# Patient Record
Sex: FEMALE | Race: WHITE | Employment: OTHER | URBAN - METROPOLITAN AREA
[De-identification: names, ages, dates, MRNs, and addresses within clinical notes are randomized per-mention and may not be internally consistent; named-entity substitution may affect disease eponyms.]

---

## 2017-01-07 PROBLEM — H02.89: Status: ACTIVE | Noted: 2017-01-07

## 2017-02-08 ENCOUNTER — HOSPITAL ENCOUNTER (OUTPATIENT)
Dept: LAB | Age: 82
Discharge: HOME | End: 2017-02-08
Payer: COMMERCIAL

## 2017-02-08 LAB
FT4I SERPL CALC-MCNC: NORMAL
T3 SERPL-MCNC: 0.89 NG/ML (ref 0.6–1.81)
T3RU NFR SERPL: 39 % (ref 31–39)
T4 FREE SERPL-MCNC: 1.6 NG/DL (ref 0.78–1.46)
TSH W FREE THYROID I,TSHELE: 0.13 UIU/ML

## 2017-02-08 PROCEDURE — 86800 THYROGLOBULIN ANTIBODY: CPT | Performed by: OPHTHALMOLOGY

## 2017-02-08 PROCEDURE — 36415 COLL VENOUS BLD VENIPUNCTURE: CPT | Performed by: OPHTHALMOLOGY

## 2017-02-08 PROCEDURE — 84439 ASSAY OF FREE THYROXINE: CPT | Performed by: OPHTHALMOLOGY

## 2017-02-08 PROCEDURE — 86376 MICROSOMAL ANTIBODY EACH: CPT | Performed by: OPHTHALMOLOGY

## 2017-02-08 PROCEDURE — 84479 ASSAY OF THYROID (T3 OR T4): CPT | Performed by: OPHTHALMOLOGY

## 2017-02-08 PROCEDURE — 84480 ASSAY TRIIODOTHYRONINE (T3): CPT | Performed by: OPHTHALMOLOGY

## 2017-02-08 PROCEDURE — 84445 ASSAY OF TSI GLOBULIN: CPT | Performed by: OPHTHALMOLOGY

## 2017-02-08 PROCEDURE — 84443 ASSAY THYROID STIM HORMONE: CPT | Performed by: OPHTHALMOLOGY

## 2017-02-08 PROCEDURE — 83520 IMMUNOASSAY QUANT NOS NONAB: CPT | Performed by: OPHTHALMOLOGY

## 2017-02-09 LAB
THYROGLOB AB SERPL-ACNC: 5.2 IU/ML (ref 0–0.9)
THYROPEROXIDASE AB SERPL-ACNC: 7 IU/ML (ref 0–34)
TSH RECEP AB SER-ACNC: 198.4 IU/L (ref 0–1.75)

## 2017-02-14 LAB — TSI ACT/NOR SER: 467 % (ref 0–139)

## 2017-03-01 PROBLEM — H57.89 PERIORBITAL SWELLING: Status: ACTIVE | Noted: 2017-03-01

## 2017-04-20 PROBLEM — R94.31 ABNORMAL EKG: Status: ACTIVE | Noted: 2017-04-20

## 2017-05-22 PROBLEM — E05.00 GRAVES' DISEASE: Chronic | Status: ACTIVE | Noted: 2017-05-22

## 2017-08-07 PROBLEM — Z79.52 ON PREDNISONE THERAPY: Status: ACTIVE | Noted: 2017-08-07

## 2017-08-07 PROBLEM — E78.1 HYPERTRIGLYCERIDEMIA: Chronic | Status: ACTIVE | Noted: 2017-08-07

## 2017-08-07 PROBLEM — E05.00 GRAVES' OPHTHALMOPATHY: Chronic | Status: ACTIVE | Noted: 2017-08-07

## 2017-08-07 PROBLEM — H25.019 CORTICAL AGE-RELATED CATARACT: Chronic | Status: ACTIVE | Noted: 2017-08-07

## 2017-08-15 ENCOUNTER — HOSPITAL ENCOUNTER (OUTPATIENT)
Dept: MAMMOGRAPHY | Age: 82
Discharge: HOME | End: 2017-08-15
Attending: OBSTETRICS & GYNECOLOGY
Payer: COMMERCIAL

## 2017-08-15 DIAGNOSIS — Z12.31 VISIT FOR SCREENING MAMMOGRAM: ICD-10-CM

## 2017-08-15 PROCEDURE — 77067 SCR MAMMO BI INCL CAD: CPT

## 2017-08-29 PROBLEM — T50.905A HYPERGLYCEMIA, DRUG-INDUCED: Status: ACTIVE | Noted: 2017-08-29

## 2017-08-29 PROBLEM — R73.9 HYPERGLYCEMIA, DRUG-INDUCED: Status: ACTIVE | Noted: 2017-08-29

## 2017-10-13 ENCOUNTER — HOSPITAL ENCOUNTER (OUTPATIENT)
Dept: CT IMAGING | Age: 82
Discharge: HOME | End: 2017-10-13
Attending: OPHTHALMOLOGY
Payer: MEDICARE

## 2017-10-13 DIAGNOSIS — E07.9 THYROID EYE DISEASE: ICD-10-CM

## 2017-10-13 DIAGNOSIS — H57.89 THYROID EYE DISEASE: ICD-10-CM

## 2017-10-13 PROCEDURE — 70480 CT ORBIT/EAR/FOSSA W/O DYE: CPT

## 2017-12-05 PROBLEM — R73.09 ELEVATED HEMOGLOBIN A1C: Chronic | Status: ACTIVE | Noted: 2017-12-05

## 2018-03-13 PROBLEM — Z79.899 ON POTASSIUM WASTING DIURETIC THERAPY: Chronic | Status: ACTIVE | Noted: 2018-03-13

## 2018-03-22 ENCOUNTER — HOSPITAL ENCOUNTER (OUTPATIENT)
Dept: MAMMOGRAPHY | Age: 83
Discharge: HOME | End: 2018-03-22
Attending: INTERNAL MEDICINE
Payer: MEDICARE

## 2018-03-22 DIAGNOSIS — M85.80 OSTEOPENIA, UNSPECIFIED LOCATION: Chronic | ICD-10-CM

## 2018-03-22 PROCEDURE — 77080 DXA BONE DENSITY AXIAL: CPT

## 2018-05-01 PROBLEM — I45.2 RBBB (RIGHT BUNDLE BRANCH BLOCK WITH LEFT ANTERIOR FASCICULAR BLOCK): Status: ACTIVE | Noted: 2017-04-20

## 2018-05-10 ENCOUNTER — HOSPITAL ENCOUNTER (OUTPATIENT)
Dept: SURGERY | Age: 83
Discharge: HOME | End: 2018-05-10

## 2018-05-10 NOTE — PERIOP NOTES
No show to 10:00 appointment. Patient called. States she has been sick and still doesn't fell well. Explained that this appointment will need to be rescheduled and to call Dr. Maxine Chambers office. Patient verbalized understanding. Left voicemail at Dr. Maxine Chambers office informing them that pre assessment appointment will need to be rescheduled due to no show with call back # [phone].

## 2018-05-15 ENCOUNTER — HOSPITAL ENCOUNTER (OUTPATIENT)
Dept: SURGERY | Age: 83
Discharge: HOME | End: 2018-05-15
Attending: OPHTHALMOLOGY

## 2018-05-15 VITALS
HEART RATE: 56 BPM | RESPIRATION RATE: 16 BRPM | SYSTOLIC BLOOD PRESSURE: 138 MMHG | OXYGEN SATURATION: 97 % | WEIGHT: 129.1 LBS | DIASTOLIC BLOOD PRESSURE: 69 MMHG | BODY MASS INDEX: 20.26 KG/M2 | TEMPERATURE: 97.1 F | HEIGHT: 67 IN

## 2018-05-15 NOTE — PERIOP NOTES
Patient verified name, , and surgery as listed in [facility]. Cardiologist note dated 18 states \" low risk\" for surgery, ok to hold Eliquis 48 hours. Pt reports surgeon and cardiologist agreed on 48 hour hold. (Noted in EMR if needed for reference : ekgs dated 5-3-18, 17, echo dated 16, cath 12-9-15, cardiologist note 18.)    Type 1A surgery, PAT assessment complete. Labs per surgeon: none. Labs per anesthesia protocol: none. EKG: not needed today per anesthesia guidelines. Hibiclens and instructions given per hospital policy. Patient provided with and instructed on educational handouts including Guide to Surgery, Pain Management, Hand Hygiene, Blood Transfusion Education, and [facility] Anesthesia Brochure. Patient answered medical/surgical history questions at their best of ability. All prior to admission medications documented in [facility]. Original medication prescription bottle not visualized during patient appointment. Patient instructed to hold all vitamins 7 days prior to surgery and NSAIDS 5 days prior to surgery, patient verbalized understanding. Medications to be held: advil- 5 days; eliquis-48 hours : per surgeon's instructions to pt and cardiologist ok. Patient instructed to continue previous medications as prescribed prior to surgery and to take the following medications the day of surgery according to anesthesia guidelines with a small sip of water: xanax, tambocor, metoprolol, synthroid. Patient teach back successful and patient demonstrates knowledge of instructions.

## 2018-05-16 NOTE — H&P
[facility]  HISTORY AND PHYSICAL      Name:Komal NARANJO.  MR#: [id number]  : 1934  ACCOUNT #: [de-identified]   ADMIT DATE: 2018    The patient is scheduled for surgery on 2018 at 2:30 p.m. ALLERGIES:  LISINOPRIL. CHIEF COMPLAINT:  Swelling around her eyes, bilateral frequent tearing, light sensitivity that interferes with reading. PAST MEDICAL PAST SURGICAL, SOCIAL, AND FAMILY HISTORY:  Hypertension, atrial fibrillation, thyroid removal in , ovarian cancer in , hyperthyroidism, hyperlipidemia, Graves' disease and vitrectomy. CURRENT MEDICATIONS:  On a list that I will be faxing over. They include Flecainide, metoprolol, Eliquis, indapamide, Synthroid, lovastatin. PHYSICAL EXAMINATION:   CARDIOVASCULAR:  Her heart is S1, S2, NASR. LUNGS:  Clear to auscultation,    FINDINGS AND DIAGNOSIS:  Thyroid, ophthalmopathy, dermatochalasis, right upper lid and left upper lid, right lower lid, left lower lid, festoon on lower lid. PLAN OF CARE:  4 lid blepharoplasty including festoon excision.       Eddie Jeans, MD MJW/TOD  D: 05/15/2018 15:34     T: 05/15/2018 16:21  JOB #: [id number]

## 2018-05-17 ENCOUNTER — ANESTHESIA EVENT (OUTPATIENT)
Dept: SURGERY | Age: 83
End: 2018-05-17
Payer: MEDICARE

## 2018-05-17 RX ORDER — NALOXONE HYDROCHLORIDE 0.4 MG/ML
0.04 INJECTION, SOLUTION INTRAMUSCULAR; INTRAVENOUS; SUBCUTANEOUS
Status: CANCELLED | OUTPATIENT
Start: 2018-05-17

## 2018-05-17 RX ORDER — HYDROMORPHONE HYDROCHLORIDE 2 MG/ML
0.5 INJECTION, SOLUTION INTRAMUSCULAR; INTRAVENOUS; SUBCUTANEOUS
Status: CANCELLED | OUTPATIENT
Start: 2018-05-17

## 2018-05-17 RX ORDER — SODIUM CHLORIDE, SODIUM LACTATE, POTASSIUM CHLORIDE, CALCIUM CHLORIDE 600; 310; 30; 20 MG/100ML; MG/100ML; MG/100ML; MG/100ML
100 INJECTION, SOLUTION INTRAVENOUS CONTINUOUS
Status: CANCELLED | OUTPATIENT
Start: 2018-05-17

## 2018-05-17 RX ORDER — OXYCODONE HYDROCHLORIDE 5 MG/1
5 TABLET ORAL
Status: CANCELLED | OUTPATIENT
Start: 2018-05-17

## 2018-05-18 ENCOUNTER — HOSPITAL ENCOUNTER (OUTPATIENT)
Age: 83
Setting detail: OUTPATIENT SURGERY
Discharge: HOME | End: 2018-05-18
Attending: OPHTHALMOLOGY | Admitting: OPHTHALMOLOGY
Payer: MEDICARE

## 2018-05-18 ENCOUNTER — ANESTHESIA (OUTPATIENT)
Dept: SURGERY | Age: 83
End: 2018-05-18
Payer: MEDICARE

## 2018-05-18 VITALS
HEART RATE: 63 BPM | SYSTOLIC BLOOD PRESSURE: 135 MMHG | OXYGEN SATURATION: 91 % | TEMPERATURE: 97.2 F | DIASTOLIC BLOOD PRESSURE: 64 MMHG | WEIGHT: 127 LBS | BODY MASS INDEX: 20.19 KG/M2 | RESPIRATION RATE: 14 BRPM

## 2018-05-18 PROCEDURE — 74011000250 HC RX REV CODE- 250: Performed by: OPHTHALMOLOGY

## 2018-05-18 PROCEDURE — 77030018836 HC SOL IRR NACL ICUM -A: Performed by: OPHTHALMOLOGY

## 2018-05-18 PROCEDURE — 74011000250 HC RX REV CODE- 250

## 2018-05-18 PROCEDURE — 76210000020 HC REC RM PH II FIRST 0.5 HR: Performed by: OPHTHALMOLOGY

## 2018-05-18 PROCEDURE — 76060000036 HC ANESTHESIA 2.5 TO 3 HR: Performed by: OPHTHALMOLOGY

## 2018-05-18 PROCEDURE — 77030020782 HC GWN BAIR PAWS FLX 3M -B: Performed by: ANESTHESIOLOGY

## 2018-05-18 PROCEDURE — 76210000016 HC OR PH I REC 1 TO 1.5 HR: Performed by: OPHTHALMOLOGY

## 2018-05-18 PROCEDURE — 77030006689 HC BLD OPHTH BVR BD -A: Performed by: OPHTHALMOLOGY

## 2018-05-18 PROCEDURE — 76010000132 HC OR TIME 2.5 TO 3 HR: Performed by: OPHTHALMOLOGY

## 2018-05-18 PROCEDURE — 74011250636 HC RX REV CODE- 250/636

## 2018-05-18 PROCEDURE — 77030002916 HC SUT ETHLN J&J -A: Performed by: OPHTHALMOLOGY

## 2018-05-18 PROCEDURE — 77030013908 HC PROTCT CRNL CRCH B&L -A: Performed by: OPHTHALMOLOGY

## 2018-05-18 PROCEDURE — 88305 TISSUE EXAM BY PATHOLOGIST: CPT | Performed by: OPHTHALMOLOGY

## 2018-05-18 RX ORDER — BUPIVACAINE HYDROCHLORIDE 5 MG/ML
INJECTION, SOLUTION EPIDURAL; INTRACAUDAL AS NEEDED
Status: DISCONTINUED | OUTPATIENT
Start: 2018-05-18 | End: 2018-05-18 | Stop reason: HOSPADM

## 2018-05-18 RX ORDER — DEXAMETHASONE SODIUM PHOSPHATE 100 MG/10ML
INJECTION INTRAMUSCULAR; INTRAVENOUS AS NEEDED
Status: DISCONTINUED | OUTPATIENT
Start: 2018-05-18 | End: 2018-05-18 | Stop reason: HOSPADM

## 2018-05-18 RX ORDER — MIDAZOLAM HYDROCHLORIDE 1 MG/ML
INJECTION, SOLUTION INTRAMUSCULAR; INTRAVENOUS AS NEEDED
Status: DISCONTINUED | OUTPATIENT
Start: 2018-05-18 | End: 2018-05-18 | Stop reason: HOSPADM

## 2018-05-18 RX ORDER — SODIUM CHLORIDE, SODIUM LACTATE, POTASSIUM CHLORIDE, CALCIUM CHLORIDE 600; 310; 30; 20 MG/100ML; MG/100ML; MG/100ML; MG/100ML
INJECTION, SOLUTION INTRAVENOUS
Status: DISCONTINUED | OUTPATIENT
Start: 2018-05-18 | End: 2018-05-18 | Stop reason: HOSPADM

## 2018-05-18 RX ORDER — MIDAZOLAM HYDROCHLORIDE 1 MG/ML
2 INJECTION, SOLUTION INTRAMUSCULAR; INTRAVENOUS
Status: DISCONTINUED | OUTPATIENT
Start: 2018-05-18 | End: 2018-05-18 | Stop reason: HOSPADM

## 2018-05-18 RX ORDER — LIDOCAINE HYDROCHLORIDE 10 MG/ML
0.1 INJECTION INFILTRATION; PERINEURAL AS NEEDED
Status: DISCONTINUED | OUTPATIENT
Start: 2018-05-18 | End: 2018-05-18 | Stop reason: HOSPADM

## 2018-05-18 RX ORDER — LIDOCAINE HYDROCHLORIDE 20 MG/ML
INJECTION, SOLUTION EPIDURAL; INFILTRATION; INTRACAUDAL; PERINEURAL AS NEEDED
Status: DISCONTINUED | OUTPATIENT
Start: 2018-05-18 | End: 2018-05-18 | Stop reason: HOSPADM

## 2018-05-18 RX ORDER — LIDOCAINE HYDROCHLORIDE 10 MG/ML
INJECTION INFILTRATION; PERINEURAL AS NEEDED
Status: DISCONTINUED | OUTPATIENT
Start: 2018-05-18 | End: 2018-05-18 | Stop reason: HOSPADM

## 2018-05-18 RX ORDER — PROPOFOL 10 MG/ML
INJECTION, EMULSION INTRAVENOUS
Status: DISCONTINUED | OUTPATIENT
Start: 2018-05-18 | End: 2018-05-18 | Stop reason: HOSPADM

## 2018-05-18 RX ORDER — TETRACAINE HYDROCHLORIDE 5 MG/ML
SOLUTION OPHTHALMIC AS NEEDED
Status: DISCONTINUED | OUTPATIENT
Start: 2018-05-18 | End: 2018-05-18 | Stop reason: HOSPADM

## 2018-05-18 RX ORDER — FENTANYL CITRATE 50 UG/ML
100 INJECTION, SOLUTION INTRAMUSCULAR; INTRAVENOUS ONCE
Status: DISCONTINUED | OUTPATIENT
Start: 2018-05-18 | End: 2018-05-18 | Stop reason: HOSPADM

## 2018-05-18 RX ORDER — ONDANSETRON 2 MG/ML
INJECTION INTRAMUSCULAR; INTRAVENOUS AS NEEDED
Status: DISCONTINUED | OUTPATIENT
Start: 2018-05-18 | End: 2018-05-18 | Stop reason: HOSPADM

## 2018-05-18 RX ORDER — MIDAZOLAM HYDROCHLORIDE 1 MG/ML
2 INJECTION, SOLUTION INTRAMUSCULAR; INTRAVENOUS ONCE
Status: DISCONTINUED | OUTPATIENT
Start: 2018-05-18 | End: 2018-05-18 | Stop reason: HOSPADM

## 2018-05-18 RX ORDER — CEFAZOLIN SODIUM 1 G/3ML
INJECTION, POWDER, FOR SOLUTION INTRAMUSCULAR; INTRAVENOUS AS NEEDED
Status: DISCONTINUED | OUTPATIENT
Start: 2018-05-18 | End: 2018-05-18 | Stop reason: HOSPADM

## 2018-05-18 RX ORDER — SODIUM CHLORIDE, SODIUM LACTATE, POTASSIUM CHLORIDE, CALCIUM CHLORIDE 600; 310; 30; 20 MG/100ML; MG/100ML; MG/100ML; MG/100ML
100 INJECTION, SOLUTION INTRAVENOUS CONTINUOUS
Status: DISCONTINUED | OUTPATIENT
Start: 2018-05-18 | End: 2018-05-18 | Stop reason: HOSPADM

## 2018-05-18 RX ORDER — PROPOFOL 10 MG/ML
INJECTION, EMULSION INTRAVENOUS AS NEEDED
Status: DISCONTINUED | OUTPATIENT
Start: 2018-05-18 | End: 2018-05-18 | Stop reason: HOSPADM

## 2018-05-18 RX ADMIN — PROPOFOL 40 MG: 10 INJECTION, EMULSION INTRAVENOUS at 14:46

## 2018-05-18 RX ADMIN — DEXAMETHASONE SODIUM PHOSPHATE 4 MG: 100 INJECTION INTRAMUSCULAR; INTRAVENOUS at 15:00

## 2018-05-18 RX ADMIN — MIDAZOLAM HYDROCHLORIDE 1 MG: 1 INJECTION, SOLUTION INTRAMUSCULAR; INTRAVENOUS at 15:18

## 2018-05-18 RX ADMIN — ONDANSETRON 4 MG: 2 INJECTION INTRAMUSCULAR; INTRAVENOUS at 15:00

## 2018-05-18 RX ADMIN — SODIUM CHLORIDE, SODIUM LACTATE, POTASSIUM CHLORIDE, CALCIUM CHLORIDE: 600; 310; 30; 20 INJECTION, SOLUTION INTRAVENOUS at 14:36

## 2018-05-18 RX ADMIN — PROPOFOL 50 MCG/KG/MIN: 10 INJECTION, EMULSION INTRAVENOUS at 14:46

## 2018-05-18 RX ADMIN — CEFAZOLIN SODIUM 0.5 G: 1 INJECTION, POWDER, FOR SOLUTION INTRAMUSCULAR; INTRAVENOUS at 17:03

## 2018-05-18 RX ADMIN — MIDAZOLAM HYDROCHLORIDE 0.5 MG: 1 INJECTION, SOLUTION INTRAMUSCULAR; INTRAVENOUS at 14:49

## 2018-05-18 RX ADMIN — SODIUM CHLORIDE, SODIUM LACTATE, POTASSIUM CHLORIDE, CALCIUM CHLORIDE: 600; 310; 30; 20 INJECTION, SOLUTION INTRAVENOUS at 15:14

## 2018-05-18 RX ADMIN — MIDAZOLAM HYDROCHLORIDE 0.5 MG: 1 INJECTION, SOLUTION INTRAMUSCULAR; INTRAVENOUS at 15:45

## 2018-05-18 RX ADMIN — LIDOCAINE HYDROCHLORIDE 60 MG: 20 INJECTION, SOLUTION EPIDURAL; INFILTRATION; INTRACAUDAL; PERINEURAL at 14:42

## 2018-05-18 NOTE — IP AVS SNAPSHOT
[address] [address] 
[phone] Patient: Lyndon Quarles MRN: [id number] GAW:[id number] About your hospitalization You were admitted on:  May 18, 2018 You last received care in the:  [facility] PACU You were discharged on:  May 18, 2018 Why you were hospitalized Your primary diagnosis was:  Not on File Follow-up Information Follow up With Details Comments Contact Info Candelaria Yeung MD   [facility] 18 [facility] for [address] [facility], [place], operated by [facility] [address] 
[phone] In 1 week For suture removal   
  
Your Scheduled Appointments Thursday June 14, 2018  8:40 AM EDT  
LAB with IMD LAB [facility] ([facility] [address] [facility] [address]  
[phone] Thursday June 21, 2018 11:00 AM EDT Office Visit with Shaggy Ocampo MD  
Internal Medicine and Diagnostics ([facility] [address] [facility] [id number] [phone] Discharge Orders None A check rufina indicates which time of day the medication should be taken. My Medications ASK your doctor about these medications Instructions Each Dose to Equal  
 Morning Noon Evening Bedtime ADVIL 200 mg tablet Generic drug:  ibuprofen Your last dose was: Your next dose is: Take  by mouth. ALPRAZolam 0.25 mg tablet Commonly known as:  Sunita Stai Your last dose was: Your next dose is:    
   
   
 1 po daily, prn anxiety  
     
   
   
   
  
 apixaban 2.5 mg tablet Commonly known as:  Norman Royals Your last dose was: Your next dose is: Take 1/2 to 1 BID as directed CITRACAL PLUS D 315-200 mg-unit Tab Generic drug:  calcium citrate-vitamin d3 Your last dose was: Your next dose is: Take 1 Tab by mouth nightly. 1 Tab DIETARY SUPPLEMENT PO Your last dose was: Your next dose is: Take  by mouth. HYDROEYE  
     
   
   
   
  
 flecainide 100 mg tablet Commonly known as:  TAMBOCOR Your last dose was: Your next dose is: Take 1 tablet twice a day  
     
   
   
   
  
 glucosamine-chondroitin 500-400 mg Cap Commonly known as:  [facility] Your last dose was: Your next dose is: Take 1 Cap by mouth two (2) times a day. 1 Cap  
    
   
   
   
  
 indapamide 1.25 mg tablet Commonly known as:  Homero Lowe Your last dose was: Your next dose is: Take 1 Tab by mouth daily. 1.25 mg  
    
   
   
   
  
 lovastatin 40 mg tablet Commonly known as:  MEVACOR Your last dose was: Your next dose is: Take 1 Tab by mouth nightly. 40 mg  
    
   
   
   
  
 metoprolol succinate 50 mg XL tablet Commonly known as:  TOPROL-XL Your last dose was: Your next dose is: Take 1 Tab by mouth daily. 50 mg  
    
   
   
   
  
 multivitamin tablet Commonly known as:  ONE A DAY Your last dose was: Your next dose is: Take 1 Tab by mouth nightly. 1 Tab PREMARIN 0.625 mg/gram vaginal cream  
Generic drug:  conjugated estrogens Your last dose was: Your next dose is: Insert 0.5 g into vagina every seven (7) days. 0.5 g  
    
   
   
   
  
 SYNTHROID 88 mcg tablet Generic drug:  levothyroxine Your last dose was: Your next dose is: Take 1 Tab by mouth Daily (before breakfast). 88 mcg VITAMIN C 500 mg tablet Generic drug:  ascorbic acid (vitamin C) Your last dose was: Your next dose is: Take 500 mg by mouth as needed.   
 500 mg  
    
   
 XIIDRA 5 % Dpet Generic drug:  lifitegrast  
   
Your last dose was: Your next dose is:    
   
   
 INSTILL 1 UNIT AS DIRECTED INTO BOTH EYES TWICE A DAY Discharge Instructions The Center for [address] Lamont Vaughn MD 
 
Oculoplastic Pre & Post-Operative Instructions: 
 
Before Surgery: 1. Do not eat or drink for 8 hours before your surgery. 2.  You may take any medicines with a sip of water, however. 3.  Be sure to take any regular medicines unless you have received  
     alternative instructions. 4.  Do not use any Aspirin for 2 weeks before surgery unless told otherwise. During Surgery: 1. If you need to cough or sneeze, please give us some warning. 2.  Tell us if you are uncomfortable. The first 48 hours after surgery: 1. Use ice packs as much as tolerated for the first 48 hours.] 2. Try to avoid heavy lifting or straining for the first week. 3.  Do not engage in important business for the first 24 hours after surgery. 4.  Avoid alcoholic beverages for the first 24 hours. 5.  If possible, have a relative or friend stay with you the first night after surgery. 6.  Try to keep your head elevated for the first 1 to 2 days when resting. 7.  You may resume Aspirin use the day after surgery. 8.  You may shower or bathe, but try to keep the wounds as clean and dry as possible. When to call Dr. Delfino Keller after surgery: (office [phone] or cell phone [phone]) 1. If you have severe pain that you have not expected. 2.  If you notice a marked decrease in the vision of either eye. You should check 
     each eye separately 2 to 3 times in the first 24 hours after surgery. 3.  If your eyes swell shut. 4.  If you have excessive bleeding or blood soaking the bandages. 5.  If you develop a fever greater than 101 degrees orally. Introducing [facility]! [facility] introduces [employer] patient portal. Now you can access parts of your medical record, email your doctor's office, and request medication refills online. 1. In your internet browser, go to [email or web address] 2. Click on the First Time User? Click Here link in the Sign In box. You will see the New Member Sign Up page. 3. Enter your hulu Access Code exactly as it appears below. You will not need to use this code after youve completed the sign-up process. If you do not sign up before the expiration date, you must request a new code. · hulu Access Code: [email or web address] Expires: 5/30/2018 10:24 AM 
 
4. Enter the last four digits of your Social Security Number (xxxx) and Date of Birth (mm/dd/yyyy) as indicated and click Submit. You will be taken to the next sign-up page. 5. Create a hulu ID. This will be your hulu login ID and cannot be changed, so think of one that is secure and easy to remember. 6. Create a hulu password. You can change your password at any time. 7. Enter your Password Reset Question and Answer. This can be used at a later time if you forget your password. 8. Enter your e-mail address. You will receive e-mail notification when new information is available in [address] 9. Click Sign Up. You can now view and download portions of your medical record. 10. Click the Download Summary menu link to download a portable copy of your medical information. If you have questions, please visit the Frequently Asked Questions section of the hulu website. Remember, hulu is NOT to be used for urgent needs. For medical emergencies, dial 911. Now available from your iPhone and Android! Introducing Lamine Luu As a [facility] patient, I wanted to make you aware of our electronic visit tool called Lamine Luu. [facility] 24/7 allows you to connect within minutes with a medical provider 24 hours a day, seven days a week via a mobile device or tablet or logging into a secure website from your computer. You can access [employer] from anywhere in the [place]. A virtual visit might be right for you when you have a simple condition and feel like you just dont want to get out of bed, or cant get away from work for an appointment, when your regular [facility] provider is not available (evenings, weekends or holidays), or when youre out of town and need minor care. Electronic visits cost only $49 and if the [facility] provider determines a prescription is needed to treat your condition, one can be electronically transmitted to a nearby pharmacy*. Please take a moment to enroll today if you have not already done so. The enrollment process is free and takes just a few minutes. To enroll, please download the [facility] [employer] 24/[employer] shirley to your tablet or phone, or visit [email or web address] to enroll on your computer. And, as an [address] [place] [address] patient with a [employer] account, the results of your visits will be scanned into your electronic medical record and your primary care provider will be able to view the scanned results. We urge you to continue to see your regular [facility] provider for your ongoing medical care. And while your primary care provider may not be the one available when you seek a Lamine Leesmarandafin virtual visit, the peace of mind you get from getting a real diagnosis real time can be priceless. For more information on F[employer]arandaf[employer], view our Frequently Asked Questions (FAQs) at [email or web address]. Sincerely, 
 
Stacy Ayala MD 
Chief Medical Officer [address] [place] [address] *:  certain medications cannot be prescribed via Flickmesharmin Providers Seen During Your Hospitalization Provider Specialty Primary office phone Rigo Marx MD Ophthalmology [phone] Your Primary Care Physician (PCP) Primary Care Physician Office Phone Office Fax  [facility] [address] [phone] You are allergic to the following No active allergies Recent Documentation Weight BMI OB Status Smoking Status 57.6 kg 20.19 kg/m2 Hysterectomy Never Smoker Emergency Contacts Name Discharge Info Relation Home Work Mobile [facility] DISCHARGE CAREGIVER [3] Son [22] [phone] Jose Sparrow DISCHARGE CAREGIVER [3] Son [22] [phone] Patient Belongings The following personal items are in your possession at time of discharge: 
  Dental Appliances: None Please provide this summary of care documentation to your next provider. Signatures-by signing, you are acknowledging that this After Visit Summary has been reviewed with you and you have received a copy. Patient Signature:  ____________________________________________________________ Date:  ____________________________________________________________  
  
Linda Mckeon Provider Signature:  ____________________________________________________________ Date:  ____________________________________________________________

## 2018-05-18 NOTE — ANESTHESIA POSTPROCEDURE EVALUATION
Post-Anesthesia Evaluation and Assessment    Patient: Julieta Tubbs MRN: [id number]  SSN: [id number]    YOB: 1934  Age: 80 y.o. Sex: female       Cardiovascular Function/Vital Signs  Visit Vitals    /67 (BP 1 Location: Right arm, BP Patient Position: At rest)    Pulse 63    Temp 36.2 °C (97.2 °F)    Resp 14    Wt 57.6 kg (127 lb)    SpO2 93%    BMI 20.19 kg/m2       Patient is status post total IV anesthesia anesthesia for Procedure(s):  BLEPHAROPLASTY X 4 .    Nausea/Vomiting: None    Postoperative hydration reviewed and adequate. Pain:  Pain Scale 1: Visual (05/18/18 1741)  Pain Intensity 1: 0 (05/18/18 1741)   Managed    Neurological Status:   Neuro (WDL): Within Defined Limits (05/18/18 1300)   At baseline    Mental Status and Level of Consciousness: Arousable    Pulmonary Status:   O2 Device: Room air (05/18/18 1741)   Adequate oxygenation and airway patent    Complications related to anesthesia: None    Post-anesthesia assessment completed.  No concerns    Signed By: Adalberto Bush MD     May 18, 2018

## 2018-05-18 NOTE — DISCHARGE INSTRUCTIONS
The [facility]/ Derian Ferguson MD    Oculoplastic Pre & Post-Operative Instructions:    Before Surgery:    1. Do not eat or drink for 8 hours before your surgery. 2.  You may take any medicines with a sip of water, however. 3.  Be sure to take any regular medicines unless you have received        alternative instructions. 4.  Do not use any Aspirin for 2 weeks before surgery unless told otherwise. During Surgery:    1. If you need to cough or sneeze, please give us some warning. 2.  Tell us if you are uncomfortable. The first 48 hours after surgery:    1. Use ice packs as much as tolerated for the first 48 hours.]  2. Try to avoid heavy lifting or straining for the first week. 3.  Do not engage in important business for the first 24 hours after surgery. 4.  Avoid alcoholic beverages for the first 24 hours. 5.  If possible, have a relative or friend stay with you the first night after surgery. 6.  Try to keep your head elevated for the first 1 to 2 days when resting. 7.  You may resume Aspirin use the day after surgery. 8.  You may shower or bathe, but try to keep the wounds as clean and dry as possible. When to call Dr. Lizz Higginbotham after surgery: (office [phone] or cell phone [phone])    1. If you have severe pain that you have not expected. 2.  If you notice a marked decrease in the vision of either eye. You should check       each eye separately 2 to 3 times in the first 24 hours after surgery. 3.  If your eyes swell shut. 4.  If you have excessive bleeding or blood soaking the bandages. 5.  If you develop a fever greater than 101 degrees orally. [address] Detailed

## 2018-05-18 NOTE — BRIEF OP NOTE
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      Surgeon(s) and Role:     * Sybil Gómez MD - Primary         Surgical Assistant: none    Surgical Staff:  Circ-1: Zackary Sheriff RN  Circ-Relief: Sandra Ruby RN; Chadwick Horton RN; Waldemar Holloway RN  Scrub Tech-1: Bonilla Krause  Event Time In   Incision Start 1445   Incision Close 1702     Anesthesia: MAC   Estimated Blood Loss: less than 40 cc  Specimens:   ID Type Source Tests Collected by Time Destination   1 : LEFT EYE MYXEDEMA Preservative   Sybil Gómez MD 5/18/2018 1538 Pathology      Findings: diffuse soft tissue thickening from myxedematous fluid changes   Complications: 2 small nicks of lower lid skin (closed with sutures), small nick of lacrimal gland of right upper lid requiring cautery to control oozing  Implants: * No implants in log *

## 2018-05-18 NOTE — PROGRESS NOTES
Spiritual Care Visit, initial visit. Visited with patient at bedside. Prayed for patient's healing and health. Visit by Gianfranco Hill, Staff .  Mary Ellen., Mary.B., B.A.

## 2018-05-19 NOTE — OP NOTES
[address] [place] [address] REPORT    Name:Jacqueline NARANJO.  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 PROCEDURE PERFORMED:  Upper and lower lid blepharoplasty with excision of myxedematous tissue and excision of excess fat pads. SURGEON: Dinh    ASSISTANT: none     ANESTHESIA: local/mac    ESTIMATED BLOOD LOSS: less than 40 cc    SPECIMENS REMOVED: skin and soft tissue, sample sent to pathology     COMPLICATIONS:   1. Two small skin perforations in the lower lid because of excision of myxedema tissue lower lids, one on each side. 2.  Small incision in right upper lateral lacrimal gland area which required cautery. IMPLANTS: none     OPERATIVE NOTE:  The patient was checked in the holding area and was brought into the operating suite. Upper and lower lid blepharoplasty type incisions were marked, although the amount of skin tissue planned for the lower lids was much greater than normal.  Under sedation, 2% lidocaine with epinephrine was injected into both upper and lower lids on both sides. She was prepped and draped. Attention was first directed to the left upper lid. A 15 blade was used to incise through skin and soft tissue. Nesha and tenotomy scissors were used to excise down to the orbital septum. Tissue was excised. There was excess myxedematous tissue that was also excised involving some of the orbicularis layer. The preaponeurotic fat pads were identified. These were fairly extensive. The fat pads were mobilized and then were clamped, cut and cauterized. Additional excision was done of a medial fat pad with care taken in this area.   Cautery was done to complete hemostasis. Attention was then directed to the left lower lid. A 15 blade was used to incise the skin and soft tissue of the lower lid. Nesha and tenotomy scissors were used to excise edematous and soft tissue down to the orbital septum. Excess myxedematous tissue of the layer anterior to the orbital septum and below the skin layer was excised. In doing so, I created a small nick of the skin, which had to be closed with interrupted 6-0 Vicryl sutures. Additional myxedematous tissue was excised. The lower lid fat pads were opened and excess fat was mobilized, with care taken to avoid any damage to the underlying inferior oblique muscle. All the excess fat was clamped, cut and cauterized. Care was taken, especially in the medial area. Hemostasis was achieved with cautery. Both upper and lower lids were inspected for any active bleeders and cautery was done. These were then closed with 5-0 nylon in a running fashion for the upper lid and 6-0 nylon in a running fashion for the lower lid. Additional injection was given on the right side in the upper and lower lids. A similar procedure was then done in the right upper lid and the right lower lid. A 15 blade was used to incise the skin and soft tissue. Nesha and tenotomy scissors were used to excise all edematous tissue that was feasible to excise. The upper lid fat pads were reduced as described on the left side. Cautery was done to achieve hemostasis. There was a small cut made in the upper right lacrimal gland that required some cautery. However, the lacrimal gland here was identified and was not excised. The right lower lid was similarly operated on, as was in the left lower lid. A 15 blade was used to incise the skin and soft tissue, and excess myxedematous tissue was excised from the layer anterior to the orbital septum up to the skin layer.   There was a small nick made in the skin almost in the same position of the left lower lid. This was closed with interrupted 6-0 Vicryl sutures. Excess myxedematous tissue was excised in the medial aspect, as well, as was done on the left side. The lower lid fat pads were mobilized with care taken to avoid damage to the underlying inferior oblique muscle. These were clamped, cut and cauterized. Additional cautery was done to achieve hemostasis. The upper and lower lids were inspected for any bleeders and additional cautery was done to achieve complete hemostasis. The right upper lid was then closed with a running 5-0 nylon suture and the lower lid was closed with a running 6-0 nylon suture. During the procedure, the globes were protected with Lacrilube and a corneal protector on each side. Tetracaine drops were used before placement of the protectors. There was a tendency throughout the procedure for the left eye to open up more than the right eye, requiring multiple repositionings of the corneal protector. I am not sure why she has this difference. At the end of the procedure, both sides were inspected. This showed a marked reduction in her excess tissue. I did not have to do separate festoon excision because of the way I did internal excision of the myxedematous tissue from the lower lids. I am hoping this will not result in ectropion of the lower lids, but that will need to be watched. This patient may need canthoplasty in the future to handle that. She does not appear to have undue tension on the lower lids at the end of the procedure, and I did not feel a canthoplasty was required at the end of this procedure. COMMENT:  This patient has a very unusual amount of myxedematous tissue that was excised during this procedure. This was quite unusual compared to a regular blepharoplasty. We achieved a very substantial reduction in her lid swelling on both sides with this procedure.   She tolerated the surgery well and went to recovery in satisfactory condition.       MD Shirin Guadalupe / Gill Julien  D: 05/18/2018 17:14     T: 05/18/2018 19:25  JOB #: [id number]

## 2018-06-21 PROBLEM — H81.10 BENIGN PAROXYSMAL POSITIONAL VERTIGO: Status: ACTIVE | Noted: 2018-06-21

## 2018-08-17 ENCOUNTER — HOSPITAL ENCOUNTER (OUTPATIENT)
Dept: MAMMOGRAPHY | Age: 83
Discharge: HOME | End: 2018-08-17
Attending: INTERNAL MEDICINE
Payer: MEDICARE

## 2018-08-17 DIAGNOSIS — Z12.31 VISIT FOR SCREENING MAMMOGRAM: ICD-10-CM

## 2018-08-17 PROCEDURE — 77067 SCR MAMMO BI INCL CAD: CPT

## 2019-04-30 PROBLEM — M81.0 OSTEOPOROSIS WITHOUT CURRENT PATHOLOGICAL FRACTURE: Status: ACTIVE | Noted: 2019-04-30

## 2019-04-30 PROBLEM — E55.9 HYPOVITAMINOSIS D: Status: ACTIVE | Noted: 2019-04-30

## 2019-05-08 ENCOUNTER — HOSPITAL ENCOUNTER (EMERGENCY)
Age: 84
Discharge: HOME | End: 2019-05-08
Payer: MEDICARE

## 2019-05-08 VITALS
OXYGEN SATURATION: 95 % | BODY MASS INDEX: 20.72 KG/M2 | DIASTOLIC BLOOD PRESSURE: 61 MMHG | HEIGHT: 67 IN | RESPIRATION RATE: 16 BRPM | HEART RATE: 62 BPM | WEIGHT: 132 LBS | TEMPERATURE: 97.9 F | SYSTOLIC BLOOD PRESSURE: 131 MMHG

## 2019-05-08 DIAGNOSIS — I48.0 INTERMITTENT ATRIAL FIBRILLATION (HCC): Primary | ICD-10-CM

## 2019-05-08 LAB
ALBUMIN SERPL-MCNC: 3.4 G/DL (ref 3.2–4.6)
ALBUMIN/GLOB SERPL: 1 {RATIO} (ref 1.2–3.5)
ALP SERPL-CCNC: 85 U/L (ref 50–136)
ALT SERPL-CCNC: 16 U/L (ref 12–65)
ANION GAP SERPL CALC-SCNC: 7 MMOL/L (ref 7–16)
AST SERPL-CCNC: 15 U/L (ref 15–37)
ATRIAL RATE: 110 BPM
ATRIAL RATE: 61 BPM
BASOPHILS # BLD: 0 K/UL (ref 0–0.2)
BASOPHILS NFR BLD: 1 % (ref 0–2)
BILIRUB SERPL-MCNC: 0.5 MG/DL (ref 0.2–1.1)
BUN SERPL-MCNC: 24 MG/DL (ref 8–23)
CALCIUM SERPL-MCNC: 9.3 MG/DL (ref 8.3–10.4)
CALCULATED P AXIS, ECG09: -125 DEGREES
CALCULATED P AXIS, ECG09: 70 DEGREES
CALCULATED R AXIS, ECG10: -85 DEGREES
CALCULATED R AXIS, ECG10: -94 DEGREES
CALCULATED T AXIS, ECG11: -34 DEGREES
CALCULATED T AXIS, ECG11: -6 DEGREES
CHLORIDE SERPL-SCNC: 105 MMOL/L (ref 98–107)
CO2 SERPL-SCNC: 27 MMOL/L (ref 21–32)
CREAT SERPL-MCNC: 0.81 MG/DL (ref 0.6–1)
DIAGNOSIS, 93000: NORMAL
DIAGNOSIS, 93000: NORMAL
DIFFERENTIAL METHOD BLD: ABNORMAL
EOSINOPHIL # BLD: 0.1 K/UL (ref 0–0.8)
EOSINOPHIL NFR BLD: 2 % (ref 0.5–7.8)
ERYTHROCYTE [DISTWIDTH] IN BLOOD BY AUTOMATED COUNT: 12.9 % (ref 11.9–14.6)
GLOBULIN SER CALC-MCNC: 3.4 G/DL (ref 2.3–3.5)
GLUCOSE SERPL-MCNC: 97 MG/DL (ref 65–100)
HCT VFR BLD AUTO: 40.1 % (ref 35.8–46.3)
HGB BLD-MCNC: 13 G/DL (ref 11.7–15.4)
IMM GRANULOCYTES # BLD AUTO: 0 K/UL (ref 0–0.5)
IMM GRANULOCYTES NFR BLD AUTO: 0 % (ref 0–5)
LYMPHOCYTES # BLD: 1.6 K/UL (ref 0.5–4.6)
LYMPHOCYTES NFR BLD: 33 % (ref 13–44)
MCH RBC QN AUTO: 29.9 PG (ref 26.1–32.9)
MCHC RBC AUTO-ENTMCNC: 32.4 G/DL (ref 31.4–35)
MCV RBC AUTO: 92.2 FL (ref 79.6–97.8)
MONOCYTES # BLD: 0.6 K/UL (ref 0.1–1.3)
MONOCYTES NFR BLD: 13 % (ref 4–12)
NEUTS SEG # BLD: 2.5 K/UL (ref 1.7–8.2)
NEUTS SEG NFR BLD: 52 % (ref 43–78)
NRBC # BLD: 0 K/UL (ref 0–0.2)
P-R INTERVAL, ECG05: 160 MS
P-R INTERVAL, ECG05: 202 MS
PLATELET # BLD AUTO: 244 K/UL (ref 150–450)
PMV BLD AUTO: 9.5 FL (ref 9.4–12.3)
POTASSIUM SERPL-SCNC: 3.2 MMOL/L (ref 3.5–5.1)
PROT SERPL-MCNC: 6.8 G/DL (ref 6.3–8.2)
Q-T INTERVAL, ECG07: 360 MS
Q-T INTERVAL, ECG07: 458 MS
QRS DURATION, ECG06: 160 MS
QRS DURATION, ECG06: 176 MS
QTC CALCULATION (BEZET), ECG08: 461 MS
QTC CALCULATION (BEZET), ECG08: 487 MS
RBC # BLD AUTO: 4.35 M/UL (ref 4.05–5.2)
SODIUM SERPL-SCNC: 139 MMOL/L (ref 136–145)
TROPONIN I BLD-MCNC: 0.01 NG/ML (ref 0.02–0.05)
TROPONIN I BLD-MCNC: 0.01 NG/ML (ref 0.02–0.05)
VENTRICULAR RATE, ECG03: 110 BPM
VENTRICULAR RATE, ECG03: 61 BPM
WBC # BLD AUTO: 4.9 K/UL (ref 4.3–11.1)

## 2019-05-08 PROCEDURE — 93005 ELECTROCARDIOGRAM TRACING: CPT

## 2019-05-08 PROCEDURE — 85025 COMPLETE CBC W/AUTO DIFF WBC: CPT

## 2019-05-08 PROCEDURE — 84484 ASSAY OF TROPONIN QUANT: CPT

## 2019-05-08 PROCEDURE — 80053 COMPREHEN METABOLIC PANEL: CPT

## 2019-05-08 PROCEDURE — 99285 EMERGENCY DEPT VISIT HI MDM: CPT

## 2019-05-08 RX ORDER — METOPROLOL TARTRATE 5 MG/5ML
5 INJECTION INTRAVENOUS ONCE
Status: DISCONTINUED | OUTPATIENT
Start: 2019-05-08 | End: 2019-05-08 | Stop reason: HOSPADM

## 2019-05-08 NOTE — ED TRIAGE NOTES
EMs: Pt arriving from home via [address]. Called out for pressure in throat and chest. 1 nitro and 500NS. 18 LAC. Pt kept burping during transport. PT has hx of Afib. Pt denies N/V/diaphoresis. Upon arrival pt denies pain. Dr Deshpande Part present during triage. Pt denies discomfort at this time, sts \"I feel much better, I just thought for a moment there when it was so bad that I just couldn't afford to ignore it\" Pt sts previous discomfort was relieved by belching

## 2019-05-08 NOTE — ED NOTES
Verbal report given to JACOBY Daniels for continuation of patient care upon shift change. Patient care transferred at this time.

## 2019-05-08 NOTE — ED PROVIDER NOTES
80-year-old female brought by EMS for rapid heartbeat. Patient states she was getting ready for bed when she started having some chest pressure radiating up into her neck. She felt like her heart was racing. The patient has a history of A. Fib with RVR was recently in the hospital had to be cardioverted. She recently was on flecainide however it was recently discontinued. Palpitations This is a recurrent problem. The current episode started 1 to 2 hours ago. The problem has been resolved. The problem occurs constantly. The problem is associated with nothing. Associated symptoms include chest pain and irregular heartbeat. Pertinent negatives include no diaphoresis, no fever, no syncope, no leg pain and no lower extremity edema. Risk factors include cardiac disease. Her past medical history is significant for atrial fibrillation. Past Medical History:  
Diagnosis Date  Anxiety disorder  Aortic regurgitation  Asymptomatic bilateral carotid artery stenosis  Diverticulosis of large intestine  GERD (gastroesophageal reflux disease)  History of colon polyps  History of herpes zoster  History of ovarian cancer  Hypertension  Irritable bowel syndrome  Malignant neoplasm of rectosigmoid junction  Mitral valve disorders(424.0)  Mixed hyperlipidemia  Occlusion and stenosis of carotid artery without mention of cerebral infarction  Osteoarthritis  Osteopenia  Paroxysmal atrial fibrillation  Postmenopausal atrophic vaginitis  Primary hypothyroidism  Vaginal atrophy  Vitamin D deficiency Past Surgical History:  
Procedure Laterality Date  HX ACL RECONSTRUCTION Left 2014  HX CARPAL TUNNEL RELEASE Left 2009  HX CATARACT REMOVAL Right 2/2013  
 intraocular lens implant  HX COLECTOMY  HX COLONOSCOPY  12/2014  
 dr Nadia Morales  HX CYST INCISION AND DRAINAGE Left   
 swollen tissue around knee replacement  HX HEART CATHETERIZATION  12/9/2015  HX KNEE REPLACEMENT Left 2004  HX SMALL BOWEL RESECTION  12/2014 and 1/2015 [address]  HX THYROIDECTOMY  08/24/2016  HX TONSILLECTOMY Age 32  
 HX VITRECTOMY Right Family History:  
Problem Relation Age of Onset  Heart Disease Mother  Stroke Mother  Heart Failure Mother  Osteoporosis Mother  Diabetes Father  Stroke Father  Heart Failure Father  Cancer Sister  Breast Cancer Sister 80  Stroke Sister  Heart Disease Other  Hypertension Other  Diabetes Other  Stroke Other  Thyroid Disease Neg Hx Social History Socioeconomic History  Marital status:  Spouse name: Not on file  Number of children: Not on file  Years of education: Not on file  Highest education level: Not on file Occupational History  Not on file Social Needs  Financial resource strain: Not on file  Food insecurity:  
  Worry: Not on file Inability: Not on file  Transportation needs:  
  Medical: Not on file Non-medical: Not on file Tobacco Use  Smoking status: Never Smoker  Smokeless tobacco: Never Used Substance and Sexual Activity  Alcohol use: Yes Comment: occassional  
 Drug use: No  
  Types: Prescription, OTC  Sexual activity: Not on file Lifestyle  Physical activity:  
  Days per week: Not on file Minutes per session: Not on file  Stress: Not on file Relationships  Social connections:  
  Talks on phone: Not on file Gets together: Not on file Attends [employer] service: Not on file Active member of club or organization: Not on file Attends meetings of clubs or organizations: Not on file Relationship status: Not on file  Intimate partner violence:  
  Fear of current or ex partner: Not on file Emotionally abused: Not on file Physically abused: Not on file Forced sexual activity: Not on file Other Topics Concern  Not on file Social History Narrative  Not on file ALLERGIES: Patient has no known allergies. Review of Systems Constitutional: Negative. Negative for activity change, diaphoresis and fever. HENT: Negative. Eyes: Negative. Respiratory: Negative. Cardiovascular: Positive for chest pain and palpitations. Negative for syncope. Gastrointestinal: Negative. Genitourinary: Negative. Musculoskeletal: Negative. Skin: Negative. Neurological: Negative. Psychiatric/Behavioral: Negative. All other systems reviewed and are negative. Vitals:  
 05/08/19 6560 05/08/19 0050 05/08/19 0657 05/08/19 4872 BP:      
Pulse: 63 60 90 (!) 114 Resp:      
Temp:      
SpO2:      
Weight:      
Height:      
      
 
Physical Exam  
Constitutional: She is oriented to person, place, and time. She appears well-developed and well-nourished. No distress. HENT:  
Head: Normocephalic and atraumatic. Right Ear: External ear normal.  
Left Ear: External ear normal.  
Nose: Nose normal.  
Mouth/Throat: Oropharynx is clear and moist. No oropharyngeal exudate. Eyes: Pupils are equal, round, and reactive to light. Conjunctivae and EOM are normal. Right eye exhibits no discharge. Left eye exhibits no discharge. No scleral icterus. Neck: Normal range of motion. Neck supple. No JVD present. No tracheal deviation present. Cardiovascular: Intact distal pulses. An irregularly irregular rhythm present. Tachycardia present. Patient intermittently converting to normal sinus rhythm and back to A. Fib. Patient has a right bundle branch block. EKG appears unchanged. Pulmonary/Chest: Effort normal and breath sounds normal. No stridor. No respiratory distress. She has no wheezes. She exhibits no tenderness. Abdominal: Soft. Bowel sounds are normal. She exhibits no distension and no mass. There is no tenderness. Musculoskeletal: Normal range of motion. She exhibits no edema or tenderness. Neurological: She is alert and oriented to person, place, and time. No cranial nerve deficit. Skin: Skin is warm and dry. No rash noted. She is not diaphoretic. No erythema. No pallor. Psychiatric: She has a normal mood and affect. Her behavior is normal. Thought content normal.  
Nursing note and vitals reviewed. MDM Number of Diagnoses or Management Options Diagnosis management comments: Patient has been in and out of atrial fibrillation times the patient states that it just lasted longer this time. She converted spontaneously back into the sinus rhythm rate was controlled. Have her take an extra beta-blocker follow-up cardiology tomorrow Amount and/or Complexity of Data Reviewed Clinical lab tests: ordered and reviewed Tests in the radiology section of CPT®: ordered and reviewed Tests in the medicine section of CPT®: ordered and reviewed Risk of Complications, Morbidity, and/or Mortality Presenting problems: high Diagnostic procedures: high Management options: high Patient Progress Patient progress: stable Procedures

## 2019-05-08 NOTE — DISCHARGE INSTRUCTIONS
Patient Education        Learning About Atrial Fibrillation  What is atrial fibrillation? Atrial fibrillation (say \"AY-tree-gloria qaw-oxsz-QSF-shun\") is the most common type of irregular heartbeat (arrhythmia). Normally, the heart beats in a strong, steady rhythm. In atrial fibrillation, a problem with the heart's electrical system causes the two upper parts of the heart (the atria) to quiver, or fibrillate. Your heart rate also may be faster than normal.  Atrial fibrillation can be dangerous because if the heartbeat isn't strong and steady, blood can collect, or pool, in the atria. And pooled blood is more likely to form clots. Clots can travel to the brain, block blood flow, and cause a stroke. Atrial fibrillation can also lead to heart failure. Treatment for atrial fibrillation helps prevent stroke and heart failure. It also helps relieve symptoms. Atrial fibrillation is often caused by another heart problem. It may happen after heart surgery. It may also be caused by other problems, such as an overactive thyroid gland or lung disease. Many people with atrial fibrillation are able to live full and active lives. What are the symptoms? Some people feel symptoms when they have episodes of atrial fibrillation. But other people don't notice any symptoms. If you have symptoms, you may feel:  · A fluttering, racing, or pounding feeling in your chest called palpitations. · Weak or tired. · Dizzy or lightheaded. · Short of breath. · Chest pain. · Confused. You may notice signs of atrial fibrillation when you check your pulse. Your pulse may seem uneven or fast.  What can you expect when you have atrial fibrillation? At first, spells of atrial fibrillation may come on suddenly and last a short time. It may go away on its own or it goes away after treatment. This is called paroxysmal atrial fibrillation. Over time, the spells may last longer and occur more often. They often don't go away on their own.   How is it treated? Treatments can help you feel better and prevent future problems, especially stroke and heart failure. The main types of treatment slow the heart rate, control the heart rhythm, and help prevent stroke. Your treatment will depend on the cause of your atrial fibrillation, your symptoms, and your risk for stroke. · Heart rate treatment. Medicine may be used to slow your heart rate. Your heartbeat may still be irregular. But these medicines keep your heart from beating too fast. They may also help relieve your symptoms. · Heart rhythm treatment. Different treatments may be used to try to stop atrial fibrillation and keep it from returning. They can also relieve symptoms. These treatments include medicine, electrical cardioversion to shock the heart back to a normal rhythm, a procedure called catheter ablation, and heart surgery. · Stroke prevention. You and your doctor can decide how to lower your risk. You may decide to take a blood-thinning medicine, such as aspirin or an anticoagulant. How can you live well with it? You can live well and help manage atrial fibrillation by having a heart-healthy lifestyle. To have a heart-healthy lifestyle:  · Don't smoke. · Eat heart-healthy foods. · Be active. Talk to your doctor about what type and level of exercise is safe for you. · Stay at a healthy weight. Lose weight if you need to. · Manage stress. · Avoid alcohol if it triggers symptoms. · Manage other health problems such as high blood pressure, high cholesterol, and diabetes. · Avoid getting sick from the flu. Get a flu shot every year. Where can you learn more? Go to http://moisés-purnima.info/. Enter 668-243-4349 in the search box to learn more about \"Learning About Atrial Fibrillation. \"  Current as of: July 22, 2018  Content Version: 11.9  © 7868-7334 [employer].  Care instructions adapted under license by [employer] (which disclaims liability or warranty for this information). If you have questions about a medical condition or this instruction, always ask your healthcare professional. [place] [address] any warranty or liability for your use of this information.

## 2019-05-16 ENCOUNTER — HOSPITAL ENCOUNTER (OUTPATIENT)
Dept: ULTRASOUND IMAGING | Age: 84
Discharge: HOME | End: 2019-05-16

## 2019-05-16 DIAGNOSIS — K80.20 CALCULUS OF GALLBLADDER WITHOUT CHOLECYSTITIS WITHOUT OBSTRUCTION: ICD-10-CM

## 2019-08-17 ENCOUNTER — HOSPITAL ENCOUNTER (EMERGENCY)
Age: 84
Discharge: HOME | End: 2019-08-17
Attending: EMERGENCY MEDICINE
Payer: MEDICARE

## 2019-08-17 PROCEDURE — 99291 CRITICAL CARE FIRST HOUR: CPT | Performed by: EMERGENCY MEDICINE

## 2019-08-19 ENCOUNTER — HOSPITAL ENCOUNTER (OUTPATIENT)
Dept: MAMMOGRAPHY | Age: 84
Discharge: HOME | End: 2019-08-19
Attending: INTERNAL MEDICINE

## 2019-08-19 DIAGNOSIS — Z12.31 VISIT FOR SCREENING MAMMOGRAM: ICD-10-CM

## 2020-08-24 ENCOUNTER — HOSPITAL ENCOUNTER (OUTPATIENT)
Dept: MAMMOGRAPHY | Age: 85
Discharge: HOME | End: 2020-08-24
Attending: INTERNAL MEDICINE

## 2020-08-24 DIAGNOSIS — Z12.31 SCREENING MAMMOGRAM FOR HIGH-RISK PATIENT: ICD-10-CM

## 2021-08-04 ENCOUNTER — TRANSCRIBE ORDER (OUTPATIENT)
Dept: REGISTRATION | Age: 86
End: 2021-08-04

## 2021-08-04 DIAGNOSIS — Z12.31 VISIT FOR SCREENING MAMMOGRAM: Primary | ICD-10-CM

## 2021-09-15 ENCOUNTER — HOSPITAL ENCOUNTER (OUTPATIENT)
Dept: MAMMOGRAPHY | Age: 86
Discharge: HOME | End: 2021-09-15
Attending: INTERNAL MEDICINE
Payer: MEDICARE

## 2021-09-15 DIAGNOSIS — M81.0 OSTEOPOROSIS WITHOUT CURRENT PATHOLOGICAL FRACTURE, UNSPECIFIED OSTEOPOROSIS TYPE: ICD-10-CM

## 2021-09-15 DIAGNOSIS — Z12.31 VISIT FOR SCREENING MAMMOGRAM: ICD-10-CM

## 2021-09-15 PROCEDURE — 77063 BREAST TOMOSYNTHESIS BI: CPT

## 2021-09-15 PROCEDURE — 77080 DXA BONE DENSITY AXIAL: CPT

## 2021-09-16 NOTE — PROGRESS NOTES
Patient notified of results and instructions, per Dr Timmy Lechuga note, and voiced understanding.  She agrees to referral.

## 2021-09-16 NOTE — PROGRESS NOTES
Mile Woodallr, please let patient know her dexa scan is showing severe osteoporosis. I am referring her to rheumatology for further management of this.  Thank you

## 2022-01-20 ENCOUNTER — HOSPITAL ENCOUNTER (OUTPATIENT)
Dept: LAB | Age: 87
Discharge: HOME | End: 2022-01-20
Payer: MEDICARE

## 2022-01-20 DIAGNOSIS — D47.2 IGG MONOCLONAL PROTEIN DISORDER: ICD-10-CM

## 2022-01-20 LAB
25(OH)D3 SERPL-MCNC: 44.4 NG/ML (ref 30–100)
ALBUMIN SERPL-MCNC: 3.6 G/DL (ref 3.2–4.6)
ALBUMIN/GLOB SERPL: 0.9 {RATIO} (ref 1.2–3.5)
ALP SERPL-CCNC: 87 U/L (ref 50–136)
ALT SERPL-CCNC: 22 U/L (ref 12–65)
ANION GAP SERPL CALC-SCNC: 4 MMOL/L (ref 7–16)
AST SERPL-CCNC: 19 U/L (ref 15–37)
BASOPHILS # BLD: 0 K/UL (ref 0–0.2)
BASOPHILS NFR BLD: 1 % (ref 0–2)
BILIRUB SERPL-MCNC: 0.4 MG/DL (ref 0.2–1.1)
BUN SERPL-MCNC: 19 MG/DL (ref 8–23)
CALCIUM SERPL-MCNC: 9.6 MG/DL (ref 8.3–10.4)
CHLORIDE SERPL-SCNC: 103 MMOL/L (ref 98–107)
CO2 SERPL-SCNC: 27 MMOL/L (ref 21–32)
CREAT SERPL-MCNC: 0.9 MG/DL (ref 0.6–1)
CRP SERPL-MCNC: <0.3 MG/DL (ref 0–0.9)
DIFFERENTIAL METHOD BLD: NORMAL
EOSINOPHIL # BLD: 0.1 K/UL (ref 0–0.8)
EOSINOPHIL NFR BLD: 1 % (ref 0.5–7.8)
ERYTHROCYTE [DISTWIDTH] IN BLOOD BY AUTOMATED COUNT: 13.2 % (ref 11.9–14.6)
GLOBULIN SER CALC-MCNC: 4.1 G/DL (ref 2.3–3.5)
GLUCOSE SERPL-MCNC: 97 MG/DL (ref 65–100)
HCT VFR BLD AUTO: 42.8 % (ref 35.8–46.3)
HGB BLD-MCNC: 14.1 G/DL (ref 11.7–15.4)
IMM GRANULOCYTES # BLD AUTO: 0 K/UL (ref 0–0.5)
IMM GRANULOCYTES NFR BLD AUTO: 1 % (ref 0–5)
LDH SERPL L TO P-CCNC: 182 U/L (ref 110–210)
LYMPHOCYTES # BLD: 1.3 K/UL (ref 0.5–4.6)
LYMPHOCYTES NFR BLD: 23 % (ref 13–44)
MCH RBC QN AUTO: 30.1 PG (ref 26.1–32.9)
MCHC RBC AUTO-ENTMCNC: 32.9 G/DL (ref 31.4–35)
MCV RBC AUTO: 91.3 FL (ref 79.6–97.8)
MONOCYTES # BLD: 0.5 K/UL (ref 0.1–1.3)
MONOCYTES NFR BLD: 10 % (ref 4–12)
NEUTS SEG # BLD: 3.6 K/UL (ref 1.7–8.2)
NEUTS SEG NFR BLD: 64 % (ref 43–78)
NRBC # BLD: 0 K/UL (ref 0–0.2)
PLATELET # BLD AUTO: 282 K/UL (ref 150–450)
PMV BLD AUTO: 9.5 FL (ref 9.4–12.3)
POTASSIUM SERPL-SCNC: 4.1 MMOL/L (ref 3.5–5.1)
PROT SERPL-MCNC: 7.7 G/DL (ref 6.3–8.2)
RBC # BLD AUTO: 4.69 M/UL (ref 4.05–5.2)
SODIUM SERPL-SCNC: 134 MMOL/L (ref 136–145)
WBC # BLD AUTO: 5.5 K/UL (ref 4.3–11.1)

## 2022-01-20 PROCEDURE — 83615 LACTATE (LD) (LDH) ENZYME: CPT

## 2022-01-20 PROCEDURE — 82306 VITAMIN D 25 HYDROXY: CPT

## 2022-01-20 PROCEDURE — 36415 COLL VENOUS BLD VENIPUNCTURE: CPT

## 2022-01-20 PROCEDURE — 80053 COMPREHEN METABOLIC PANEL: CPT

## 2022-01-20 PROCEDURE — 84156 ASSAY OF PROTEIN URINE: CPT

## 2022-01-20 PROCEDURE — 83521 IG LIGHT CHAINS FREE EACH: CPT

## 2022-01-20 PROCEDURE — 85025 COMPLETE CBC W/AUTO DIFF WBC: CPT

## 2022-01-20 PROCEDURE — 82784 ASSAY IGA/IGD/IGG/IGM EACH: CPT

## 2022-01-20 PROCEDURE — 86140 C-REACTIVE PROTEIN: CPT

## 2022-01-20 PROCEDURE — 82232 ASSAY OF BETA-2 PROTEIN: CPT

## 2022-01-21 LAB
B2 MICROGLOB SERPL-MCNC: 3.1 MG/L (ref 0.6–2.4)
KAPPA LC FREE SER-MCNC: 342.2 MG/L (ref 3.3–19.4)
KAPPA LC FREE/LAMBDA FREE SER: 27.82 {RATIO} (ref 0.26–1.65)
LAMBDA LC FREE SERPL-MCNC: 12.3 MG/L (ref 5.7–26.3)

## 2022-01-24 LAB
ALBUMIN 24H MFR UR ELPH: 9.6 %
ALBUMIN SERPL ELPH-MCNC: 3.7 G/DL (ref 2.9–4.4)
ALBUMIN/GLOB SERPL: 1.3 {RATIO} (ref 0.7–1.7)
ALPHA1 GLOB 24H MFR UR ELPH: 3.9 %
ALPHA1 GLOB SERPL ELPH-MCNC: 0.2 G/DL (ref 0–0.4)
ALPHA2 GLOB 24H MFR UR ELPH: 11.7 %
ALPHA2 GLOB SERPL ELPH-MCNC: 0.7 G/DL (ref 0.4–1)
B-GLOBULIN MFR UR ELPH: 21 %
B-GLOBULIN SERPL ELPH-MCNC: 0.9 G/DL (ref 0.7–1.3)
GAMMA GLOB 24H MFR UR ELPH: 53.7 %
GAMMA GLOB SERPL ELPH-MCNC: 1.2 G/DL (ref 0.4–1.8)
GLOBULIN SER-MCNC: 3 G/DL (ref 2.2–3.9)
IGA SERPL-MCNC: 98 MG/DL (ref 64–422)
IGG SERPL-MCNC: 1227 MG/DL (ref 586–1602)
IGM SERPL-MCNC: 33 MG/DL (ref 26–217)
INTERPRETATION SERPL IEP-IMP: ABNORMAL
INTERPRETATION UR IFE-IMP: ABNORMAL
M PROTEIN 24H MFR UR ELPH: 34.9 %
M PROTEIN SERPL ELPH-MCNC: 0.8 G/DL
NOTE, 149533: ABNORMAL
PROT SERPL-MCNC: 6.7 G/DL (ref 6–8.5)
PROT UR-MCNC: 6.9 MG/DL

## 2022-01-27 ENCOUNTER — HOSPITAL ENCOUNTER (OUTPATIENT)
Dept: GENERAL RADIOLOGY | Age: 87
Discharge: HOME | End: 2022-01-27
Attending: INTERNAL MEDICINE
Payer: MEDICARE

## 2022-01-27 DIAGNOSIS — D47.2 IGG MONOCLONAL PROTEIN DISORDER: ICD-10-CM

## 2022-01-27 PROCEDURE — 77074 RADEX OSSEOUS SURVEY LMTD: CPT

## 2022-02-24 ENCOUNTER — HOSPITAL ENCOUNTER (OUTPATIENT)
Dept: LAB | Age: 87
Discharge: HOME | End: 2022-02-24
Payer: MEDICARE

## 2022-02-24 DIAGNOSIS — D47.2 IGG MONOCLONAL PROTEIN DISORDER: ICD-10-CM

## 2022-02-24 LAB
ALBUMIN SERPL-MCNC: 3.5 G/DL (ref 3.2–4.6)
ALBUMIN/GLOB SERPL: 1 {RATIO} (ref 1.2–3.5)
ALP SERPL-CCNC: 76 U/L (ref 50–136)
ALT SERPL-CCNC: 24 U/L (ref 12–65)
ANION GAP SERPL CALC-SCNC: 7 MMOL/L (ref 7–16)
AST SERPL-CCNC: 15 U/L (ref 15–37)
BASOPHILS # BLD: 0 K/UL (ref 0–0.2)
BASOPHILS NFR BLD: 1 % (ref 0–2)
BILIRUB SERPL-MCNC: 0.6 MG/DL (ref 0.2–1.1)
BUN SERPL-MCNC: 22 MG/DL (ref 8–23)
CALCIUM SERPL-MCNC: 10.1 MG/DL (ref 8.3–10.4)
CHLORIDE SERPL-SCNC: 101 MMOL/L (ref 98–107)
CO2 SERPL-SCNC: 27 MMOL/L (ref 21–32)
CREAT SERPL-MCNC: 0.7 MG/DL (ref 0.6–1)
DIFFERENTIAL METHOD BLD: NORMAL
EOSINOPHIL # BLD: 0.1 K/UL (ref 0–0.8)
EOSINOPHIL NFR BLD: 1 % (ref 0.5–7.8)
ERYTHROCYTE [DISTWIDTH] IN BLOOD BY AUTOMATED COUNT: 13 % (ref 11.9–14.6)
GLOBULIN SER CALC-MCNC: 3.5 G/DL (ref 2.3–3.5)
GLUCOSE SERPL-MCNC: 94 MG/DL (ref 65–100)
HCT VFR BLD AUTO: 39.5 % (ref 35.8–46.3)
HGB BLD-MCNC: 12.9 G/DL (ref 11.7–15.4)
IMM GRANULOCYTES # BLD AUTO: 0 K/UL (ref 0–0.5)
IMM GRANULOCYTES NFR BLD AUTO: 0 % (ref 0–5)
LYMPHOCYTES # BLD: 1.3 K/UL (ref 0.5–4.6)
LYMPHOCYTES NFR BLD: 23 % (ref 13–44)
MCH RBC QN AUTO: 29.7 PG (ref 26.1–32.9)
MCHC RBC AUTO-ENTMCNC: 32.7 G/DL (ref 31.4–35)
MCV RBC AUTO: 90.8 FL (ref 79.6–97.8)
MONOCYTES # BLD: 0.6 K/UL (ref 0.1–1.3)
MONOCYTES NFR BLD: 11 % (ref 4–12)
NEUTS SEG # BLD: 3.6 K/UL (ref 1.7–8.2)
NEUTS SEG NFR BLD: 64 % (ref 43–78)
NRBC # BLD: 0 K/UL (ref 0–0.2)
PLATELET # BLD AUTO: 255 K/UL (ref 150–450)
PMV BLD AUTO: 9.5 FL (ref 9.4–12.3)
POTASSIUM SERPL-SCNC: 4.1 MMOL/L (ref 3.5–5.1)
PROT SERPL-MCNC: 7 G/DL (ref 6.3–8.2)
RBC # BLD AUTO: 4.35 M/UL (ref 4.05–5.2)
SODIUM SERPL-SCNC: 135 MMOL/L (ref 136–145)
WBC # BLD AUTO: 5.6 K/UL (ref 4.3–11.1)

## 2022-02-24 PROCEDURE — 36415 COLL VENOUS BLD VENIPUNCTURE: CPT

## 2022-02-24 PROCEDURE — 88342 IMHCHEM/IMCYTCHM 1ST ANTB: CPT

## 2022-02-24 PROCEDURE — 85025 COMPLETE CBC W/AUTO DIFF WBC: CPT

## 2022-02-24 PROCEDURE — 80053 COMPREHEN METABOLIC PANEL: CPT

## 2022-02-24 PROCEDURE — 88364 INSITU HYBRIDIZATION (FISH): CPT

## 2022-02-24 PROCEDURE — 88311 DECALCIFY TISSUE: CPT

## 2022-02-24 PROCEDURE — 82784 ASSAY IGA/IGD/IGG/IGM EACH: CPT

## 2022-02-24 PROCEDURE — 88365 INSITU HYBRIDIZATION (FISH): CPT

## 2022-02-24 PROCEDURE — 83521 IG LIGHT CHAINS FREE EACH: CPT

## 2022-02-24 PROCEDURE — 88313 SPECIAL STAINS GROUP 2: CPT

## 2022-02-24 PROCEDURE — 88305 TISSUE EXAM BY PATHOLOGIST: CPT

## 2022-02-24 PROCEDURE — 84156 ASSAY OF PROTEIN URINE: CPT

## 2022-02-25 LAB
KAPPA LC FREE SER-MCNC: 386.1 MG/L (ref 3.3–19.4)
KAPPA LC FREE/LAMBDA FREE SER: 29.7 {RATIO} (ref 0.26–1.65)
LAMBDA LC FREE SERPL-MCNC: 13 MG/L (ref 5.7–26.3)

## 2022-02-28 LAB
ALBUMIN SERPL ELPH-MCNC: 3.4 G/DL (ref 2.9–4.4)
ALBUMIN/GLOB SERPL: 1.2 {RATIO} (ref 0.7–1.7)
ALPHA1 GLOB SERPL ELPH-MCNC: 0.2 G/DL (ref 0–0.4)
ALPHA2 GLOB SERPL ELPH-MCNC: 0.7 G/DL (ref 0.4–1)
B-GLOBULIN SERPL ELPH-MCNC: 1 G/DL (ref 0.7–1.3)
GAMMA GLOB SERPL ELPH-MCNC: 1 G/DL (ref 0.4–1.8)
GLOBULIN SER-MCNC: 2.9 G/DL (ref 2.2–3.9)
IGA SERPL-MCNC: 82 MG/DL (ref 64–422)
IGG SERPL-MCNC: 1121 MG/DL (ref 586–1602)
IGM SERPL-MCNC: 29 MG/DL (ref 26–217)
INTERPRETATION SERPL IEP-IMP: ABNORMAL
M PROTEIN SERPL ELPH-MCNC: 0.8 G/DL
PROT SERPL-MCNC: 6.3 G/DL (ref 6–8.5)

## 2022-03-01 LAB
ALBUMIN 24H MFR UR ELPH: 17.4 %
ALPHA1 GLOB 24H MFR UR ELPH: 3.6 %
ALPHA2 GLOB 24H MFR UR ELPH: 11.9 %
B-GLOBULIN MFR UR ELPH: 31.3 %
GAMMA GLOB 24H MFR UR ELPH: 35.8 %
INTERPRETATION UR IFE-IMP: ABNORMAL
M PROTEIN 24H MFR UR ELPH: 16.6 %
NOTE, 149533: ABNORMAL
PROT UR-MCNC: 10.7 MG/DL

## 2022-03-04 LAB
FLOW CYTOMETRY, FBTC1: NORMAL
SPECIMEN SOURCE: NORMAL
TEST ORDERED:: NORMAL

## 2022-03-18 PROBLEM — E55.9 HYPOVITAMINOSIS D: Status: ACTIVE | Noted: 2019-04-30

## 2022-03-18 PROBLEM — R73.09 ELEVATED HEMOGLOBIN A1C: Status: ACTIVE | Noted: 2017-12-05

## 2022-03-18 PROBLEM — H57.89 PERIORBITAL SWELLING: Status: ACTIVE | Noted: 2017-03-01

## 2022-03-19 PROBLEM — Z79.899 ON POTASSIUM WASTING DIURETIC THERAPY: Status: ACTIVE | Noted: 2018-03-13

## 2022-03-19 PROBLEM — E78.1 HYPERTRIGLYCERIDEMIA: Status: ACTIVE | Noted: 2017-08-07

## 2022-03-19 PROBLEM — H02.89: Status: ACTIVE | Noted: 2017-01-07

## 2022-03-19 PROBLEM — I45.2 RBBB (RIGHT BUNDLE BRANCH BLOCK WITH LEFT ANTERIOR FASCICULAR BLOCK): Status: ACTIVE | Noted: 2017-04-20

## 2022-03-19 PROBLEM — H81.10 BENIGN PAROXYSMAL POSITIONAL VERTIGO: Status: ACTIVE | Noted: 2018-06-21

## 2022-03-19 PROBLEM — M81.0 OSTEOPOROSIS WITHOUT CURRENT PATHOLOGICAL FRACTURE: Status: ACTIVE | Noted: 2019-04-30

## 2022-03-19 PROBLEM — E05.00 GRAVES' DISEASE: Status: ACTIVE | Noted: 2017-05-22

## 2022-03-19 PROBLEM — D47.2 IGG MONOCLONAL PROTEIN DISORDER: Status: ACTIVE | Noted: 2022-01-20

## 2022-03-19 PROBLEM — E05.00 GRAVES' OPHTHALMOPATHY: Status: ACTIVE | Noted: 2017-08-07

## 2022-03-20 PROBLEM — R73.9 HYPERGLYCEMIA, DRUG-INDUCED: Status: ACTIVE | Noted: 2017-08-29

## 2022-03-20 PROBLEM — H25.019 CORTICAL AGE-RELATED CATARACT: Status: ACTIVE | Noted: 2017-08-07

## 2022-03-20 PROBLEM — Z79.52 ON PREDNISONE THERAPY: Status: ACTIVE | Noted: 2017-08-07

## 2022-03-20 PROBLEM — T50.905A HYPERGLYCEMIA, DRUG-INDUCED: Status: ACTIVE | Noted: 2017-08-29

## 2022-04-22 DIAGNOSIS — D47.2 IGG MONOCLONAL PROTEIN DISORDER: Primary | ICD-10-CM

## 2022-05-05 ENCOUNTER — HOSPITAL ENCOUNTER (OUTPATIENT)
Dept: GENERAL RADIOLOGY | Age: 87
Discharge: HOME | End: 2022-05-05
Payer: MEDICARE

## 2022-05-05 PROCEDURE — 72100 X-RAY EXAM L-S SPINE 2/3 VWS: CPT

## 2022-06-02 ENCOUNTER — OFFICE VISIT (OUTPATIENT)
Dept: CARDIOLOGY CLINIC | Age: 87
End: 2022-06-02
Payer: MEDICARE

## 2022-06-02 VITALS
HEIGHT: 65 IN | BODY MASS INDEX: 21.99 KG/M2 | WEIGHT: 132 LBS | DIASTOLIC BLOOD PRESSURE: 60 MMHG | SYSTOLIC BLOOD PRESSURE: 128 MMHG | HEART RATE: 60 BPM

## 2022-06-02 DIAGNOSIS — M25.00 HEMARTHROSIS: ICD-10-CM

## 2022-06-02 DIAGNOSIS — I10 PRIMARY HYPERTENSION: ICD-10-CM

## 2022-06-02 DIAGNOSIS — I48.0 PAF (PAROXYSMAL ATRIAL FIBRILLATION) (HCC): Primary | ICD-10-CM

## 2022-06-02 DIAGNOSIS — I45.2 RBBB (RIGHT BUNDLE BRANCH BLOCK WITH LEFT ANTERIOR FASCICULAR BLOCK): ICD-10-CM

## 2022-06-02 PROCEDURE — G8420 CALC BMI NORM PARAMETERS: HCPCS | Performed by: INTERNAL MEDICINE

## 2022-06-02 PROCEDURE — 1123F ACP DISCUSS/DSCN MKR DOCD: CPT | Performed by: INTERNAL MEDICINE

## 2022-06-02 PROCEDURE — 99214 OFFICE O/P EST MOD 30 MIN: CPT | Performed by: INTERNAL MEDICINE

## 2022-06-02 PROCEDURE — 1090F PRES/ABSN URINE INCON ASSESS: CPT | Performed by: INTERNAL MEDICINE

## 2022-06-02 PROCEDURE — G8427 DOCREV CUR MEDS BY ELIG CLIN: HCPCS | Performed by: INTERNAL MEDICINE

## 2022-06-02 PROCEDURE — 4004F PT TOBACCO SCREEN RCVD TLK: CPT | Performed by: INTERNAL MEDICINE

## 2022-06-02 ASSESSMENT — ENCOUNTER SYMPTOMS: SHORTNESS OF BREATH: 0

## 2022-06-02 NOTE — PROGRESS NOTES
[address], [address] [place] [address], [address] [place] [address]  PHONE: [phone]    Rosalee Chambers  1934      SUBJECTIVE:   Rosalee Chambers is a 80 y.o. female seen for a follow up visit regarding the following:     Chief Complaint   Patient presents with    Hypertension    Irregular Heart Beat       HPI:    Patient presents for follow-up. Has done well since last visit. No dizziness, lightheadedness or syncope. No chest pain. Blood pressure appears to be controlled. Currently taking half tablet of Eliquis twice a day without hemarthrosis. No recurrent falls. She did fall in November with injuries but has not had any recurrence. Past Medical History, Past Surgical History, Family history, Social History, and Medications were all reviewed with the patient today and updated as necessary. 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    Hypovitaminosis D 04/30/2019    Osteoporosis without current pathological fracture 04/30/2019    Benign paroxysmal positional vertigo 06/21/2018    On potassium wasting diuretic therapy 03/13/2018    Elevated hemoglobin A1c 12/05/2017    Hyperglycemia, drug-induced 08/29/2017    Graves' ophthalmopathy 08/07/2017    Hypertriglyceridemia 08/07/2017    On prednisone therapy 08/07/2017     High-dose        Cortical age-related cataract 08/07/2017   [place] Lei' disease 05/22/2017    RBBB (right bundle branch block with left anterior fascicular block) 04/20/2017    Periorbital swelling 03/01/2017    Prominence of upper eyelid 01/07/2017     Bilateral.  Visual field compromise.         Primary hypothyroidism      managed with medication   Pertinent labs:  6/17/2015: TSH 0.501, free T4 1.39.  10/12/2015: TSH 0.005, free T4 2.61.  12/8/2015: TSH less than 0.005, free T4 2.8.  1/18/2016: TSH 0.362, free T4 0.58, thyroid peroxidase antibody 6 (negative), Thomasenia Nelson globulin antibodies less than 1.0 (negative). .  1/28/2016: Free T3 3.1.  4/15/2016: TSH 4.550, free T4 0.56.       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 Echo (12/8/15): EF 30-35%. Mid and distal and apical severe   hypokinesis. Mild to moderate aortic regurgitation. Normal cardiac cath. 2. Echo (2/1/16): EF 57%. Pseudonormal pattern on diastolic dysfunction. Mild to moderate LAE. 3.  Echo (6/9/21):  EF 55-60%. +DD. Moderate AR. Mild TR. RVSP 32.         PAF (paroxysmal atrial fibrillation) ([facility] [address] 12/08/2015     1. Admitted with atrial fib s/p DCCV. Started on amiodarone 12/15. 2.  Abnormal thyroid function. Amiodarone stopped and flecainide started   3/21/16. 3.  Flecainide stopped due to conduction system disease 5/19. Tried   Multaq but severe diarrhea. Plan to try increased B-blocker dose.  Hypercholesterolemia 10/16/2015    Osteopenia     Postmenopausal atrophic vaginitis 06/24/2015    Anxiety 06/23/2015    Arthritis      osteo. generalized        Asymptomatic bilateral carotid artery stenosis 08/25/2014    Aortic regurgitation 04/28/2014     1. Echo (2/16/16): Mild to moderate aortic regurgitation.  Diverticulosis of large intestine 12/23/2013     Hx of diverticulitis            Social History     Tobacco Use    Smoking status: Never Smoker    Smokeless tobacco: Never Used   Substance Use Topics    Alcohol use: Yes       ROS:    Review of Systems   Constitutional: Negative for malaise/fatigue. Cardiovascular: Negative for chest pain. Respiratory: Negative for shortness of breath. Musculoskeletal: Positive for arthritis.    Neurological: Negative for focal weakness and weakness. Psychiatric/Behavioral: Negative for depression. PHYSICAL EXAM:  Wt Readings from Last 3 Encounters:   06/02/22 132 lb (59.9 kg)   03/14/22 134 lb (60.8 kg)   02/24/22 132 lb (59.9 kg)     BP Readings from Last 3 Encounters:   06/02/22 128/60   03/14/22 136/79   02/24/22 (!) 152/78     Pulse Readings from Last 3 Encounters:   06/02/22 60   03/14/22 78   02/24/22 63       Physical Exam  Constitutional:       General: She is not in acute distress. Appearance: Normal appearance. Neck:      Vascular: No carotid bruit. Cardiovascular:      Rate and Rhythm: Normal rate and regular rhythm. Heart sounds: Murmur heard. Pulmonary:      Breath sounds: Normal breath sounds. No wheezing. Abdominal:      General: There is no distension. Palpations: Abdomen is soft. Musculoskeletal:         General: No swelling. Skin:     General: Skin is warm and dry. Neurological:      General: No focal deficit present. Psychiatric:         Mood and Affect: Mood normal.         Medical problems and test results were reviewed with the patient today.        Lab Results   Component Value Date    WBC 5.6 02/24/2022    HGB 12.9 02/24/2022    HCT 39.5 02/24/2022    MCV 90.8 02/24/2022     02/24/2022       Lab Results   Component Value Date     02/24/2022    K 4.1 02/24/2022     02/24/2022    CO2 27 02/24/2022    BUN 22 02/24/2022    CREATININE 0.70 02/24/2022    GLUCOSE 94 02/24/2022    CALCIUM 10.1 02/24/2022        Lab Results   Component Value Date    CHOL 274 (H) 07/15/2021     Lab Results   Component Value Date    TRIG 140 07/15/2021     Lab Results   Component Value Date    HDL 59 07/15/2021     Lab Results   Component Value Date    LDLCALC 190 (H) 07/15/2021     Lab Results   Component Value Date    LABVLDL 33 05/28/2020    VLDL 25 07/15/2021     No results found for: CHOLHDLRATIO     Data from outside records/labs from outside providers have been reviewed and summarized as noted in the HPI, past history and data review sections of this note       ASSESSMENT and PLAN      1. PAF (paroxysmal atrial fibrillation) (HCC)  Sinus rhythm. Continue Toprol. Patient is aware that her dose of Eliquis is not adequate for complete anticoagulation. Able to tolerate higher doses due to recurrent hemarthrosis. We have discussed left atrial appendage occlusion and she has declined. She understands her increased risk of CVA. 2. Primary hypertension  Blood pressure controlled. Continue Toprol and indapamide therapy. 3. RBBB (right bundle branch block with left anterior fascicular block)  No dizziness, lightheadedness or syncope. Call with symptoms. 4. Hemarthrosis  Doing well on current anticoagulation dose. Meghann Kate MD  6/2/2022  10:41 AM    This note may have been dictated using speech recognition software.   As a result, error of speech recognition may have occurred

## 2022-06-06 ENCOUNTER — HOSPITAL ENCOUNTER (OUTPATIENT)
Dept: LAB | Age: 87
Discharge: HOME | End: 2022-06-09
Payer: MEDICARE

## 2022-06-06 DIAGNOSIS — D47.2 IGG MONOCLONAL PROTEIN DISORDER: ICD-10-CM

## 2022-06-06 LAB
ALBUMIN SERPL-MCNC: 3.4 G/DL (ref 3.2–4.6)
ALBUMIN/GLOB SERPL: 0.9 {RATIO} (ref 1.2–3.5)
ALP SERPL-CCNC: 113 U/L (ref 50–136)
ALT SERPL-CCNC: 19 U/L (ref 12–65)
ANION GAP SERPL CALC-SCNC: 4 MMOL/L (ref 7–16)
AST SERPL-CCNC: 14 U/L (ref 15–37)
BASOPHILS # BLD: 0 K/UL (ref 0–0.2)
BASOPHILS NFR BLD: 0 % (ref 0–2)
BILIRUB SERPL-MCNC: 0.4 MG/DL (ref 0.2–1.1)
BUN SERPL-MCNC: 29 MG/DL (ref 8–23)
CALCIUM SERPL-MCNC: 9.8 MG/DL (ref 8.3–10.4)
CHLORIDE SERPL-SCNC: 102 MMOL/L (ref 98–107)
CO2 SERPL-SCNC: 33 MMOL/L (ref 21–32)
CREAT SERPL-MCNC: 0.9 MG/DL (ref 0.6–1)
DIFFERENTIAL METHOD BLD: NORMAL
EOSINOPHIL # BLD: 0.1 K/UL (ref 0–0.8)
EOSINOPHIL NFR BLD: 1 % (ref 0.5–7.8)
ERYTHROCYTE [DISTWIDTH] IN BLOOD BY AUTOMATED COUNT: 13.3 % (ref 11.9–14.6)
GLOBULIN SER CALC-MCNC: 3.7 G/DL (ref 2.3–3.5)
GLUCOSE SERPL-MCNC: 109 MG/DL (ref 65–100)
HCT VFR BLD AUTO: 39.8 % (ref 35.8–46.3)
HGB BLD-MCNC: 12.9 G/DL (ref 11.7–15.4)
IMM GRANULOCYTES # BLD AUTO: 0 K/UL (ref 0–0.5)
IMM GRANULOCYTES NFR BLD AUTO: 0 % (ref 0–5)
LYMPHOCYTES # BLD: 1.3 K/UL (ref 0.5–4.6)
LYMPHOCYTES NFR BLD: 23 % (ref 13–44)
MCH RBC QN AUTO: 29.8 PG (ref 26.1–32.9)
MCHC RBC AUTO-ENTMCNC: 32.4 G/DL (ref 31.4–35)
MCV RBC AUTO: 91.9 FL (ref 79.6–97.8)
MONOCYTES # BLD: 0.5 K/UL (ref 0.1–1.3)
MONOCYTES NFR BLD: 9 % (ref 4–12)
NEUTS SEG # BLD: 3.7 K/UL (ref 1.7–8.2)
NEUTS SEG NFR BLD: 67 % (ref 43–78)
NRBC # BLD: 0 K/UL (ref 0–0.2)
PLATELET # BLD AUTO: 246 K/UL (ref 150–450)
PMV BLD AUTO: 9.4 FL (ref 9.4–12.3)
POTASSIUM SERPL-SCNC: 4.3 MMOL/L (ref 3.5–5.1)
PROT SERPL-MCNC: 7.1 G/DL (ref 6.3–8.2)
RBC # BLD AUTO: 4.33 M/UL (ref 4.05–5.2)
SODIUM SERPL-SCNC: 139 MMOL/L (ref 136–145)
WBC # BLD AUTO: 5.6 K/UL (ref 4.3–11.1)

## 2022-06-06 PROCEDURE — 82784 ASSAY IGA/IGD/IGG/IGM EACH: CPT

## 2022-06-06 PROCEDURE — 83521 IG LIGHT CHAINS FREE EACH: CPT

## 2022-06-06 PROCEDURE — 85025 COMPLETE CBC W/AUTO DIFF WBC: CPT

## 2022-06-06 PROCEDURE — 36415 COLL VENOUS BLD VENIPUNCTURE: CPT

## 2022-06-07 LAB
KAPPA LC FREE SER-MCNC: 385.9 MG/L (ref 3.3–19.4)
KAPPA LC FREE/LAMBDA FREE SER: 29.91 {RATIO} (ref 0.26–1.65)
LAMBDA LC FREE SERPL-MCNC: 12.9 MG/L (ref 5.7–26.3)

## 2022-06-08 LAB
FLOW CYTOMETRY RESULTS: NORMAL
SPECIMEN SOURCE: NORMAL
TEST ORDERED: NORMAL

## 2022-06-10 LAB
ALBUMIN SERPL ELPH-MCNC: 3.8 G/DL (ref 2.9–4.4)
ALBUMIN/GLOB SERPL: 1.4 {RATIO} (ref 0.7–1.7)
ALPHA1 GLOB SERPL ELPH-MCNC: 0.2 G/DL (ref 0–0.4)
ALPHA2 GLOB SERPL ELPH-MCNC: 0.6 G/DL (ref 0.4–1)
B-GLOBULIN SERPL ELPH-MCNC: 0.9 G/DL (ref 0.7–1.3)
GAMMA GLOB SERPL ELPH-MCNC: 1.1 G/DL (ref 0.4–1.8)
GLOBULIN SER-MCNC: 2.8 G/DL (ref 2.2–3.9)
IGA SERPL-MCNC: 82 MG/DL (ref 64–422)
IGG SERPL-MCNC: 1237 MG/DL (ref 586–1602)
IGM SERPL-MCNC: 29 MG/DL (ref 26–217)
INTERPRETATION SERPL IEP-IMP: ABNORMAL
M PROTEIN SERPL ELPH-MCNC: 0.8 G/DL
PROT SERPL-MCNC: 6.6 G/DL (ref 6–8.5)

## 2022-06-17 ENCOUNTER — TELEPHONE (OUTPATIENT)
Dept: INTERNAL MEDICINE CLINIC | Facility: CLINIC | Age: 87
End: 2022-06-17

## 2022-06-20 ENCOUNTER — OFFICE VISIT (OUTPATIENT)
Dept: ONCOLOGY | Age: 87
End: 2022-06-20
Payer: MEDICARE

## 2022-06-20 VITALS
WEIGHT: 129.8 LBS | HEART RATE: 72 BPM | TEMPERATURE: 98.1 F | HEIGHT: 65 IN | RESPIRATION RATE: 16 BRPM | BODY MASS INDEX: 21.63 KG/M2 | DIASTOLIC BLOOD PRESSURE: 72 MMHG | SYSTOLIC BLOOD PRESSURE: 118 MMHG | OXYGEN SATURATION: 95 %

## 2022-06-20 DIAGNOSIS — D47.2 IGG MONOCLONAL PROTEIN DISORDER: Primary | ICD-10-CM

## 2022-06-20 PROCEDURE — 1090F PRES/ABSN URINE INCON ASSESS: CPT | Performed by: INTERNAL MEDICINE

## 2022-06-20 PROCEDURE — 99214 OFFICE O/P EST MOD 30 MIN: CPT | Performed by: INTERNAL MEDICINE

## 2022-06-20 PROCEDURE — 1036F TOBACCO NON-USER: CPT | Performed by: INTERNAL MEDICINE

## 2022-06-20 PROCEDURE — G8427 DOCREV CUR MEDS BY ELIG CLIN: HCPCS | Performed by: INTERNAL MEDICINE

## 2022-06-20 PROCEDURE — G8420 CALC BMI NORM PARAMETERS: HCPCS | Performed by: INTERNAL MEDICINE

## 2022-06-20 PROCEDURE — 1123F ACP DISCUSS/DSCN MKR DOCD: CPT | Performed by: INTERNAL MEDICINE

## 2022-06-20 NOTE — PROGRESS NOTES
[employer] [facility]: Established patient - follow up     Chief Complaint   Patient presents with    Follow-up     Chronological Events:  Reason for Referral: Monoclonal gammopathy   Referring Lily Ricks MD   Primary Care Provider: Osman Simpson MD   Family History of Cancer/Hematologic Disorders: Family history is significant for sister with breast cancer at the age of 80. Presenting Symptoms: No relevant physical symptoms reported. History of Present Illness:  Ms. Serena Livingston is a 80 y.o. female who presents today for follow up regarding MGUS. The past medical history is significant for vitamin D deficiency, PAF on AC, osteopenia, osteoarthritis,  occlusion and stenosis of carotid artery, HLD, mitral valve disorder, malignant neoplasm of rectosigmoid junction, IBS, HTN,  ovarian cancer, colon polyps, Graves disease, GERD, diverticulosis, bilateral carotid artery stenosis, aortic regurgitation, anxiety disorder, total colectomy, tonsillectomy, thyroidectomy, ROSALIA/BSO,  small bowel resection, heart catheterization, cyst I&D, cataract removal, vitrectomy, and orthopedic surgery. She was evaluated in consultation by Rheumatologist, Dr. Srini Ferrera, on 11/4/21 after being referred by her PCP for evaluation of osteoporosis. Review of systems and physical exam were unremarkable. She was treated with Prolia with her first injection on 11/4/21 with plans for 60 mg Prolia injection every 6 months. 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 Ms. Serena Livingston was subsequently referred to [facility], per Rheumatology, for hematology evaluation and treatment of  monoclonal gammopathy. Today, she is here for follow-up and surveillance. Labs reviewed with stable M spike and FLC ratio. She wishes to continue with surveillance.   Because her labs have been stable we will extend her appointment to 4 to 5-month follow-up versus 3-month follow-up. She did sustain a fall few weeks ago, mechanical, and per PCPs x-rays negative. Flow cytometry reviewed and negative. Rest of labs reviewed and will continue with surveillance per above. No B symptoms. Unfortunately her AC broke about a week ago when she was without air conditioning in the heat. She was able to spend some time playing cards with friends and that area was cooled. Her air conditioning has been fixed since then. Encouraged her to drink adequate fluids. Patient reports drinking 1 cup of tea per day and approximately 1 to 2 glasses of water. We discussed what adequate levels are and how to modify her intake with different flavors of decaffeinated teas. She verbalized understanding and will try this. She has a walker which she uses at times when she feels like she needs to walk faster than she is currently able. No signs and symptoms of infection at this time.      Chronological Events:   PROTEIN ELECTROPHORESIS W/REFLEX PHAM 11/4/21            IMMUNOFIXATION 11/4/21          Ref Range & Units  11/4/21 1330         Immunoglobulin G, Qt.  586 - 1,602 mg/dL  1,227          Immunoglobulin A, Qt.  64 - 422 mg/dL  93      Immunoglobulin M, Qt.  26 - 217 mg/dL  32      Immunofixation Result     Comment Abnormal         Comment: Immunofixation shows IgG monoclonal protein with kappa light chain   specificity.            Other Pertinent Information:   03/23/2021 (COVID-19, Pfizer Purple top, DILUTE for use, 12+ yrs, 30mcg/0.3mL dose)   04/13/2021 (COVID-19, Pfizer Purple top, DILUTE for use, 12+ yrs, 30mcg/0.3mL dose)      1/2022 heme/onc consultation    1/27/22 bone survey - neg for lytic lesions    2/9/22 FU - labs reviewed; p/w BmBx    2/24/22 BMBx and aspiration c/w MGUS    3/14/22 FU after BMbx - MGUS; cw surveillance   6/2022 FU - labs stable     Family History   Problem Relation Age of Onset  Rheum Arthritis Son    Lewis Diabetes Father     Diabetes Other     Hypertension Other     Heart Disease Other     Stroke Sister     Breast Cancer Sister 80    Cancer Sister     Heart Failure Father     Stroke Other     Osteoporosis Mother     Heart Failure Mother     Stroke Mother     Heart Disease Mother     Stroke Father     Thyroid Disease Neg Hx       Social History     Socioeconomic History    Marital status:      Spouse name: None    Number of children: None    Years of education: None    Highest education level: None   Occupational History    None   Tobacco Use    Smoking status: Never Smoker    Smokeless tobacco: Never Used   Substance and Sexual Activity    Alcohol use: Yes    Drug use: No     Types: Prescription, OTC    Sexual activity: None   Other Topics Concern    None   Social History Narrative    None     Social Determinants of Health     Financial Resource Strain:     Difficulty of Paying Living Expenses: Not on file   Food Insecurity:     Worried About Running Out of Food in the Last Year: Not on file    Matt of Food in the Last Year: Not on file   Transportation Needs:     Lack of Transportation (Medical): Not on file    Lack of Transportation (Non-Medical):  Not on file   Physical Activity:     Days of Exercise per Week: Not on file    Minutes of Exercise per Session: Not on file   Stress:     Feeling of Stress : Not on file   Social Connections:     Frequency of Communication with Friends and Family: Not on file    Frequency of Social Gatherings with Friends and Family: Not on file    Attends Buddhism Services: Not on file    Active Member of Clubs or Organizations: Not on file    Attends Club or Organization Meetings: Not on file    Marital Status: Not on file   Intimate Partner Violence:     Fear of Current or Ex-Partner: Not on file    Emotionally Abused: Not on file    Physically Abused: Not on file    Sexually Abused: Not on file Housing Stability:     Unable to Pay for Housing in the Last Year: Not on file    Number of Places Lived in the Last Year: Not on file    Unstable Housing in the Last Year: Not on file        Review of Systems   Constitutional: Negative for appetite change, chills, diaphoresis, fatigue, fever and unexpected weight change. HENT:   Negative for hearing loss, mouth sores, nosebleeds, sore throat, trouble swallowing and voice change. Eyes: Negative for icterus. Respiratory: Negative for chest tightness, hemoptysis, shortness of breath and wheezing. Cardiovascular: Negative for chest pain, leg swelling and palpitations. Gastrointestinal: Negative for abdominal distention, abdominal pain, blood in stool, constipation, diarrhea, nausea and vomiting. Endocrine: Negative for hot flashes. Genitourinary: Negative for difficulty urinating, frequency, vaginal bleeding and vaginal discharge. Musculoskeletal: Positive for arthralgias. Negative for flank pain, gait problem and myalgias. Skin: Negative for itching, rash and wound. Neurological: Negative for dizziness, extremity weakness, gait problem, headaches and numbness. Psychiatric/Behavioral: Negative for confusion and depression. The patient is nervous/anxious.          No Known Allergies  Past Medical History:   Diagnosis Date    Anxiety disorder     Aortic regurgitation     Asymptomatic bilateral carotid artery stenosis     Diverticulosis of large intestine     GERD (gastroesophageal reflux disease)     Graves disease     History of colon polyps     History of herpes zoster     History of ovarian cancer     Hypertension     Irritable bowel syndrome     Malignant neoplasm of rectosigmoid junction (HCC)     Menopause     Mitral valve disorders(424.0)     Mixed hyperlipidemia     Occlusion and stenosis of carotid artery without mention of cerebral infarction     Osteoarthritis     Osteopenia     Paroxysmal atrial fibrillation ([facility] [address]     Postmenopausal atrophic vaginitis     Primary hypothyroidism     Vaginal atrophy     Vitamin D deficiency      Past Surgical History:   Procedure Laterality Date    ANTERIOR CRUCIATE LIGAMENT REPAIR Left 2014    CARDIAC CATHETERIZATION  12/9/2015    CARPAL TUNNEL RELEASE Left 2009    CATARACT REMOVAL Right 2/2013    intraocular lens implant    COLONOSCOPY  12/2014    dr Abel Olmos Left     swollen tissue around knee replacement    SMALL INTESTINE SURGERY  12/2014 and 1/2015    ROSALIA AND BSO (CERVIX REMOVED)  1977    ROSALIA AND BSO (CERVIX REMOVED)  1977    THYROIDECTOMY  08/24/2016    TONSILLECTOMY      Age 32    TOTAL COLECTOMY      TOTAL KNEE ARTHROPLASTY Left 2004    VITRECTOMY Right      Current Outpatient Medications   Medication Sig Dispense Refill    ALPRAZolam (XANAX) 0.25 MG tablet 1 po daily, prn anxiety      apixaban (ELIQUIS) 2.5 MG TABS tablet Take 1/2 to 1 BID as directed      coenzyme Q10 100 MG CAPS capsule Take 100 mg by mouth daily      conjugated estrogens (PREMARIN) 0.625 MG/GM vaginal cream Place 0.5 g vaginally every 7 days      levothyroxine (SYNTHROID) 75 MCG tablet Take 75 mcg by mouth every morning (before breakfast)      mometasone (ELOCON) 0.1 % lotion APPLY TO SCALP TWICE DAILY FOR ITCH AS NEEDED      indapamide (LOZOL) 1.25 MG tablet Take 1 tablet by mouth daily 90 tablet 0    metoprolol succinate (TOPROL XL) 50 MG extended release tablet Take 1 tablet by mouth daily 90 tablet 0     No current facility-administered medications for this visit. No flowsheet data found. OBJECTIVE:  /72   Pulse 72   Temp 98.1 °F (36.7 °C)   Resp 16   Ht 5' 4.75\" (1.645 m)   Wt 129 lb 12.8 oz (58.9 kg)   SpO2 95%   BMI 21.77 kg/m²       ECOG PERFORMANCE STATUS - 1- Restricted in physically strenuous activity but ambulatory and able to carry out work of a light or sedentary nature such as light house work, office work. Pain - /10. None/Minimal pain - not affecting QOL     Fatigue - No flowsheet data found. Distress - No flowsheet data found. Physical Exam  Vitals reviewed. Exam conducted with a chaperone present. Constitutional:       General: She is not in acute distress. Appearance: Normal appearance. She is normal weight. She is not ill-appearing or toxic-appearing. HENT:      Head: Normocephalic and atraumatic. Nose: Nose normal. No congestion. Mouth/Throat:      Mouth: Mucous membranes are moist.   Eyes:      General: No scleral icterus. Extraocular Movements: Extraocular movements intact. Conjunctiva/sclera: Conjunctivae normal.      Pupils: Pupils are equal, round, and reactive to light. Cardiovascular:      Rate and Rhythm: Normal rate and regular rhythm. Heart sounds: No murmur heard. Pulmonary:      Effort: Pulmonary effort is normal. No respiratory distress. Breath sounds: Normal breath sounds. No wheezing or rales. Chest:   Breasts:      Right: No axillary adenopathy or supraclavicular adenopathy. Left: No axillary adenopathy or supraclavicular adenopathy. Abdominal:      General: There is no distension. Palpations: Abdomen is soft. There is no mass. Tenderness: There is no abdominal tenderness. There is no guarding or rebound. Musculoskeletal:      Right lower leg: No edema. Left lower leg: No edema. Lymphadenopathy:      Cervical: No cervical adenopathy. Upper Body:      Right upper body: No supraclavicular or axillary adenopathy. Left upper body: No supraclavicular or axillary adenopathy. Skin:     General: Skin is warm and dry. Coloration: Skin is not jaundiced or pale. Findings: No rash. Neurological:      General: No focal deficit present. Mental Status: She is alert and oriented to person, place, and time. Psychiatric:         Behavior: Behavior normal.         Thought Content:  Thought content normal. Labs:  No results found for this or any previous visit (from the past 168 hour(s)). Imaging: reviewed     Pathology:    2/2022 6/2022        ASSESSMENT:     Diagnosis Orders   1. IgG monoclonal protein disorder  Protein Elec with PHAM, Urine Random    PHAM and PE, Serum    CBC with Auto Differential    Comprehensive Metabolic Panel    Kappa/Lambda Quantitative Free Light Chains, Serum    Flow Cytometry Leukemia/Lymphoma, Blood       Ms. Valerio Benedict is here for FU of MGUS. 1. IgG kappa M spike 0.8, Cr <2, Hb >10, Ca normal, skeletal survey neg        - BMbx c/w MGUS    - can take B12 supplement - 500-1000 daily   - labs remain stable; c/w surveillance and extend to q4-5mo       RTC 4-5mo or sooner as needed      MDM  Number of Diagnoses or Management Options  IgG monoclonal protein disorder: established, improving     Amount and/or Complexity of Data Reviewed  Clinical lab tests: ordered and reviewed  Tests in the radiology section of CPT®: reviewed  Review and summarize past medical records: yes  Independent visualization of images, tracings, or specimens: yes    Risk of Complications, Morbidity, and/or Mortality  Presenting problems: moderate  Diagnostic procedures: low  Management options: moderate        Lab studies and imaging studies were personally reviewed. Pertinent old records were reviewed. Historical:    - At this time, I suspect she may have MGUS. Risk of conversion to multiple myeloma is about 1 %/year. We will complete the work-up including CBC, CMP, SPEP, FLC, UPEP, LDH, beta-2 microglobulin and  CRP at this time. She also is willing to proceed with skeletal survey. - we reviewed the pathophysiology of hematopoiesis as well as plasma cell disorders. Using visual aids I reviewed the dx criteria of multiple myeloma. All questions were asked and answered to the best of my ability. The patient verbalized understanding and agrees with the plan above.               [place] Heather Mcdaniel) [facility], [address] [place] [address] Hematology and Oncology  [address]  Office : [phone]  Fax : [phone]

## 2022-06-21 ENCOUNTER — TELEPHONE (OUTPATIENT)
Dept: INTERNAL MEDICINE CLINIC | Facility: CLINIC | Age: 87
End: 2022-06-21

## 2022-06-21 DIAGNOSIS — I10 PRIMARY HYPERTENSION: Primary | ICD-10-CM

## 2022-06-21 RX ORDER — INDAPAMIDE 1.25 MG/1
1.25 TABLET, FILM COATED ORAL DAILY
Qty: 90 TABLET | Refills: 0 | Status: SHIPPED | OUTPATIENT
Start: 2022-06-21 | End: 2022-08-03 | Stop reason: SDUPTHER

## 2022-06-21 RX ORDER — METOPROLOL SUCCINATE 50 MG/1
50 TABLET, EXTENDED RELEASE ORAL DAILY
Qty: 90 TABLET | Refills: 0 | Status: SHIPPED | OUTPATIENT
Start: 2022-06-21 | End: 2022-08-03 | Stop reason: SDUPTHER

## 2022-06-21 NOTE — TELEPHONE ENCOUNTER
The patient was very upset because her medication refills were not processed through [facility] as she has been requesting it in the past.  Also, it hasn't been processed on a timely manner according to her.     Needing:   - Indapamide--1.25 mg/six to seven tablets in bottle/90 days supply/[facility]  - Metoprolol Succinate--50 mg/six to seven tablets in bottle/90 days supply/[facility]

## 2022-06-21 NOTE — TELEPHONE ENCOUNTER
Unfortunately this is the first time the message was routed to me. She was seeing Dr. Chilo Mueller and then reassigned to me when Dr. Chilo Mueller left. It has been sent to [facility].

## 2022-06-22 ENCOUNTER — TELEPHONE (OUTPATIENT)
Dept: CARDIOLOGY CLINIC | Age: 87
End: 2022-06-22

## 2022-06-22 NOTE — TELEPHONE ENCOUNTER
----- Message from Raven Torres sent at 8/27/2020  9:08 AM EDT -----  Subject: Message to Provider    QUESTIONS  Information for Provider? Pt called to inform PCP that she saw DR. Rankin (SHARI Johnson) and he stopped her Jardiance. Does MD want to prescribe something   different. Please call.  ---------------------------------------------------------------------------  --------------  CALL BACK INFO  What is the best way for the office to contact you? OK to leave message on   voicemail  Preferred Call Back Phone Number? [phone]  ---------------------------------------------------------------------------  --------------  SCRIPT ANSWERS  Relationship to Patient?  Self Pt.having Afib this am w/light headedness and indigestion. Current /82 hr=88. She denies any CP. She said she often feels fatigued in the mornings but she takes a nap then wakes feeling better. She did miss her Metoprolol yesterday and wondered if that may have caused her issues today. I told her yes that could have caused some issues. I told her to take her missed dose and get back on schedule tomorrow. I will check on her tomorrow.

## 2022-06-26 ASSESSMENT — ENCOUNTER SYMPTOMS
VOICE CHANGE: 0
HEMOPTYSIS: 0
SHORTNESS OF BREATH: 0
CONSTIPATION: 0
SORE THROAT: 0
ABDOMINAL PAIN: 0
WHEEZING: 0
ABDOMINAL DISTENTION: 0
TROUBLE SWALLOWING: 0
NAUSEA: 0
VOMITING: 0
SCLERAL ICTERUS: 0
BLOOD IN STOOL: 0
CHEST TIGHTNESS: 0
DIARRHEA: 0

## 2022-07-09 ENCOUNTER — TELEPHONE (OUTPATIENT)
Dept: CARDIOLOGY CLINIC | Age: 87
End: 2022-07-09

## 2022-07-09 NOTE — TELEPHONE ENCOUNTER
She called saying that she excellently took her metoprolol succinate this morning when she usually takes it every evening. She took last night 50 mg dose in addition. She says that if she misses her pill for over 24 hours, she can go into atrial fibrillation. Heart rate and blood pressures are still really good at this point.  -She will check her heart rate and blood pressures again this evening and if systolic blood pressures are above 120s with heart rates above 60 bpm, she will take 25 mg of metoprolol succinate tonight and then restart her usual schedule of 50 mg every evening from tomorrow onwards. If she has any significant symptoms associated with recurrent A. fib, she will call us or go to the nearest ER.

## 2022-07-19 ENCOUNTER — TELEPHONE (OUTPATIENT)
Dept: INTERNAL MEDICINE CLINIC | Facility: CLINIC | Age: 87
End: 2022-07-19

## 2022-07-19 DIAGNOSIS — E03.9 PRIMARY HYPOTHYROIDISM: Primary | ICD-10-CM

## 2022-07-19 DIAGNOSIS — E55.9 HYPOVITAMINOSIS D: ICD-10-CM

## 2022-07-19 DIAGNOSIS — E78.00 HYPERCHOLESTEROLEMIA: ICD-10-CM

## 2022-07-19 DIAGNOSIS — I10 PRIMARY HYPERTENSION: ICD-10-CM

## 2022-07-19 NOTE — TELEPHONE ENCOUNTER
This will be the 1st time you will be seeing her.   Do you want lab work prior to her visit or no?/TD

## 2022-07-19 NOTE — TELEPHONE ENCOUNTER
----- Message from Arlyn Don sent at 7/18/2022  4:33 PM EDT -----  Subject: Message to Provider    QUESTIONS  Information for Provider? pt called to schedule lab work. Pt unsure if she   needs appt or can just walk in. Pt stated she usually gets blood work done   at [facility] prior to yearly physical and unsure why she did not this time. Tried to contact office. Please advise.   ---------------------------------------------------------------------------  --------------  Samantha PURDY  [phone]; OK to leave message on voicemail  ---------------------------------------------------------------------------  --------------  SCRIPT ANSWERS  Relationship to Patient?  Self

## 2022-07-24 DIAGNOSIS — I10 PRIMARY HYPERTENSION: ICD-10-CM

## 2022-07-25 RX ORDER — METOPROLOL SUCCINATE 50 MG/1
50 TABLET, EXTENDED RELEASE ORAL DAILY
Qty: 90 TABLET | Refills: 3 | OUTPATIENT
Start: 2022-07-25

## 2022-07-25 RX ORDER — INDAPAMIDE 1.25 MG/1
1.25 TABLET, FILM COATED ORAL DAILY
Qty: 90 TABLET | Refills: 3 | OUTPATIENT
Start: 2022-07-25

## 2022-07-26 NOTE — TELEPHONE ENCOUNTER
She just had labs done on 6/6/22 with hematology. Only need the labs ordered. Orders are placed and signed.

## 2022-07-28 ENCOUNTER — OFFICE VISIT (OUTPATIENT)
Dept: INTERNAL MEDICINE CLINIC | Facility: CLINIC | Age: 87
End: 2022-07-28
Payer: MEDICARE

## 2022-07-28 VITALS
OXYGEN SATURATION: 96 % | HEART RATE: 66 BPM | BODY MASS INDEX: 22.33 KG/M2 | HEIGHT: 65 IN | WEIGHT: 134 LBS | SYSTOLIC BLOOD PRESSURE: 158 MMHG | DIASTOLIC BLOOD PRESSURE: 72 MMHG

## 2022-07-28 DIAGNOSIS — E55.9 HYPOVITAMINOSIS D: ICD-10-CM

## 2022-07-28 DIAGNOSIS — E05.00 GRAVES' DISEASE: ICD-10-CM

## 2022-07-28 DIAGNOSIS — Z79.01 CHRONIC ANTICOAGULATION: Chronic | ICD-10-CM

## 2022-07-28 DIAGNOSIS — E03.9 PRIMARY HYPOTHYROIDISM: ICD-10-CM

## 2022-07-28 DIAGNOSIS — I10 PRIMARY HYPERTENSION: ICD-10-CM

## 2022-07-28 DIAGNOSIS — E03.9 PRIMARY HYPOTHYROIDISM: Primary | ICD-10-CM

## 2022-07-28 DIAGNOSIS — Z79.899 ON POTASSIUM WASTING DIURETIC THERAPY: ICD-10-CM

## 2022-07-28 LAB
25(OH)D3 SERPL-MCNC: 56.4 NG/ML (ref 30–100)
ANION GAP SERPL CALC-SCNC: 7 MMOL/L (ref 7–16)
BUN SERPL-MCNC: 20 MG/DL (ref 8–23)
CALCIUM SERPL-MCNC: 10.5 MG/DL (ref 8.3–10.4)
CHLORIDE SERPL-SCNC: 104 MMOL/L (ref 98–107)
CO2 SERPL-SCNC: 27 MMOL/L (ref 21–32)
CREAT SERPL-MCNC: 0.8 MG/DL (ref 0.6–1)
GLUCOSE SERPL-MCNC: 95 MG/DL (ref 65–100)
MAGNESIUM SERPL-MCNC: 2 MG/DL (ref 1.8–2.4)
POTASSIUM SERPL-SCNC: 4.3 MMOL/L (ref 3.5–5.1)
SODIUM SERPL-SCNC: 138 MMOL/L (ref 136–145)
TSH, 3RD GENERATION: 3.12 UIU/ML (ref 0.36–3.74)
URATE SERPL-MCNC: 5.6 MG/DL (ref 2.6–6)

## 2022-07-28 PROCEDURE — 1090F PRES/ABSN URINE INCON ASSESS: CPT | Performed by: NURSE PRACTITIONER

## 2022-07-28 PROCEDURE — G8427 DOCREV CUR MEDS BY ELIG CLIN: HCPCS | Performed by: NURSE PRACTITIONER

## 2022-07-28 PROCEDURE — 99214 OFFICE O/P EST MOD 30 MIN: CPT | Performed by: NURSE PRACTITIONER

## 2022-07-28 PROCEDURE — 1036F TOBACCO NON-USER: CPT | Performed by: NURSE PRACTITIONER

## 2022-07-28 PROCEDURE — G8420 CALC BMI NORM PARAMETERS: HCPCS | Performed by: NURSE PRACTITIONER

## 2022-07-28 PROCEDURE — 1123F ACP DISCUSS/DSCN MKR DOCD: CPT | Performed by: NURSE PRACTITIONER

## 2022-07-28 RX ORDER — LIFITEGRAST 50 MG/ML
1 SOLUTION/ DROPS OPHTHALMIC 2 TIMES DAILY
COMMUNITY

## 2022-07-28 RX ORDER — BRIMONIDINE TARTRATE 0.25 MG/ML
1 SOLUTION/ DROPS OPHTHALMIC AS NEEDED
COMMUNITY

## 2022-07-28 RX ORDER — LEVOTHYROXINE SODIUM 0.07 MG/1
75 TABLET ORAL
Qty: 90 TABLET | Status: CANCELLED | OUTPATIENT
Start: 2022-07-28

## 2022-07-28 RX ORDER — VITAMIN B COMPLEX
1 CAPSULE ORAL DAILY
COMMUNITY

## 2022-07-28 RX ORDER — IBUPROFEN 200 MG
100 TABLET ORAL EVERY 6 HOURS PRN
COMMUNITY

## 2022-07-28 ASSESSMENT — PATIENT HEALTH QUESTIONNAIRE - PHQ9
SUM OF ALL RESPONSES TO PHQ QUESTIONS 1-9: 0
1. LITTLE INTEREST OR PLEASURE IN DOING THINGS: 0
2. FEELING DOWN, DEPRESSED OR HOPELESS: 0
SUM OF ALL RESPONSES TO PHQ9 QUESTIONS 1 & 2: 0
SUM OF ALL RESPONSES TO PHQ QUESTIONS 1-9: 0

## 2022-07-28 NOTE — PROGRESS NOTES
PROGRESS NOTE    SUBJECTIVE:   Jake Corona is a 80 y.o. female seen for a follow up visit for   Chief Complaint   Patient presents with    Cyst     Cyst below left wrist and then spot on right ring finger as well. Been present for several years. Not painful. Thyroid Problem     Asking for med refill     HPI  LBP -- stiffness in mornings which improves after 10 steps. Sciatica-- pain went into left hip    Bilateral leg pain -- usually takes tylenol but rarely takes 1/2 advil. Right shoulder bursitis - drained x5 times and returns. Also gets steroid injection into bursa. Osteoporosis -- reclast.    Hypothyroid Graves Disease -- ophthalmic issues. Premarin cream by GYN,  occasional leakage of urine    PAF -- when I eat too much and hae gas on stomach increase heart rate and when I stop burping it slows back down. Cyst -- left wrist present for several years. Also on right finger present for several years. Reviewed and updated this visit by provider:  Tobacco  Allergies  Meds  Problems  Med Hx  Surg Hx  Fam Hx         Review of Systems   Constitutional:  Negative for chills, fatigue and fever. Respiratory:  Negative for chest tightness, shortness of breath and wheezing. Cardiovascular:  Negative for chest pain, palpitations and leg swelling. Gastrointestinal:  Negative for abdominal pain. Endocrine: Negative for polydipsia, polyphagia and polyuria. Genitourinary:  Negative for frequency. Musculoskeletal:  Positive for arthralgias, back pain and joint swelling. Negative for gait problem. Skin:  Negative for rash. Neurological:  Negative for weakness, numbness and headaches. Psychiatric/Behavioral:  Negative for dysphoric mood. The patient is not nervous/anxious. OBJECTIVE:    BP (!) 158/72   Pulse 66   Ht 5' 4.75\" (1.645 m)   Wt 134 lb (60.8 kg)   SpO2 96%   BMI 22.47 kg/m²      Physical Exam  Vitals and nursing note reviewed.    Constitutional: Appearance: Normal appearance. HENT:      Head: Normocephalic. Mouth/Throat:      Lips: Pink. Mouth: Mucous membranes are moist.   Eyes:      General: Lids are normal.   Neck:      Vascular: No carotid bruit. Cardiovascular:      Rate and Rhythm: Normal rate and regular rhythm. Pulmonary:      Effort: Pulmonary effort is normal.      Breath sounds: Normal breath sounds. Musculoskeletal:      Right shoulder: Swelling (anterior) present. Left wrist: Deformity (small compressible, mobile cyst -- ganglion) present. Right hand: Deformity (right 4th finger small firm cyst like structure on MIP) present. Arms:       Cervical back: Neck supple. Right lower leg: No edema. Left lower leg: No edema. Skin:     General: Skin is warm and dry. Neurological:      Mental Status: She is alert and oriented to person, place, and time. Mental status is at baseline. Gait: Gait normal.   Psychiatric:         Mood and Affect: Mood normal.         Behavior: Behavior normal.        ASSESSMENT and PLAN    1. Primary hypothyroidism  -     SYNTHROID 75 MCG tablet; Take 1 tablet by mouth in the morning., Disp-30 tablet, R-3, DAWNormal  -     TSH; Future  2. Graves' disease  -     TSH; Future  3. Hypovitaminosis D  -     Vitamin D 25 Hydroxy; Future  4. Primary hypertension  -     indapamide (LOZOL) 1.25 MG tablet; Take 1 tablet by mouth in the morning., Disp-90 tablet, R-3Normal  -     metoprolol succinate (TOPROL XL) 50 MG extended release tablet; Take 1 tablet by mouth in the morning., Disp-90 tablet, R-3Normal  -     Magnesium; Future  -     Uric Acid; Future  -     Basic Metabolic Panel; Future  5. On potassium wasting diuretic therapy  -     Magnesium; Future  -     Uric Acid; Future  -     Basic Metabolic Panel; Future  6. Chronic anticoagulation        Return for Follow-Up AWV in one month, follow up thyroid 3 months.   current treatment plan is effective, no change in therapy  lab results and schedule of future lab studies reviewed with patient  reviewed diet, exercise and weight control  cardiovascular risk and specific lipid/LDL goals reviewed  reviewed medications and side effects in detail  Reviewed recent lab drawn on 6/6/22. She asks if these will be repeated today. Will need to obtain TSH, vitamin D levels. She asks if she can be seen every 3 months. JAZMIN Dill - NP    Dictated using voice recognition software.  Proofread, but unrecognized voice recognition errors may exist.

## 2022-07-29 ENCOUNTER — TELEPHONE (OUTPATIENT)
Dept: INTERNAL MEDICINE CLINIC | Facility: CLINIC | Age: 87
End: 2022-07-29

## 2022-07-29 NOTE — TELEPHONE ENCOUNTER
Pt called to check in on her thyroid medication refill - she has a few left but gets her medication by mail. Lab results are back. Side note: pt really enjoyed her meeting yesterday with Kyle Hendrix and was very pleased with our office!

## 2022-08-03 RX ORDER — LEVOTHYROXINE SODIUM 75 MCG
75 TABLET ORAL DAILY
Qty: 30 TABLET | Refills: 3 | Status: SHIPPED | OUTPATIENT
Start: 2022-08-03 | End: 2022-11-03 | Stop reason: SDUPTHER

## 2022-08-03 RX ORDER — INDAPAMIDE 1.25 MG/1
1.25 TABLET, FILM COATED ORAL DAILY
Qty: 90 TABLET | Refills: 3 | Status: SHIPPED | OUTPATIENT
Start: 2022-08-03

## 2022-08-03 RX ORDER — METOPROLOL SUCCINATE 50 MG/1
50 TABLET, EXTENDED RELEASE ORAL DAILY
Qty: 90 TABLET | Refills: 3 | Status: SHIPPED | OUTPATIENT
Start: 2022-08-03

## 2022-08-03 NOTE — TELEPHONE ENCOUNTER
Labs look excellent. TSH is therapeutic.   Synthroid (brand) sent to local [facility] main [facility]

## 2022-08-04 ENCOUNTER — TELEPHONE (OUTPATIENT)
Dept: INTERNAL MEDICINE CLINIC | Facility: CLINIC | Age: 87
End: 2022-08-04

## 2022-08-04 NOTE — TELEPHONE ENCOUNTER
Needing:   - Synthroid--75 mg/enough for ten days/90 days supply/[id number]/[phone]

## 2022-08-04 NOTE — TELEPHONE ENCOUNTER
----- Message from [place] [facility] [place] sent at 8/4/2022 11:35 AM EDT -----  Subject: Message to Provider    QUESTIONS  Information for Provider? Pt called to state she canceled [facility] mail   service for her synthroid prescription. Please advise.   ---------------------------------------------------------------------------  --------------  Enrrique ANTON  [phone]; OK to leave message on voicemail  ---------------------------------------------------------------------------  --------------  SCRIPT ANSWERS  Relationship to Patient?  Self

## 2022-08-21 PROBLEM — Z79.899 ON POTASSIUM WASTING DIURETIC THERAPY: Chronic | Status: ACTIVE | Noted: 2018-03-13

## 2022-08-21 PROBLEM — E05.00 GRAVES' OPHTHALMOPATHY: Chronic | Status: ACTIVE | Noted: 2017-08-07

## 2022-08-21 PROBLEM — E05.00 GRAVES' DISEASE: Chronic | Status: ACTIVE | Noted: 2017-05-22

## 2022-08-21 PROBLEM — M81.0 OSTEOPOROSIS WITHOUT CURRENT PATHOLOGICAL FRACTURE: Chronic | Status: ACTIVE | Noted: 2019-04-30

## 2022-08-21 PROBLEM — H57.89 PERIORBITAL SWELLING: Status: RESOLVED | Noted: 2017-03-01 | Resolved: 2022-08-21

## 2022-08-21 ASSESSMENT — ENCOUNTER SYMPTOMS
CHEST TIGHTNESS: 0
WHEEZING: 0
SHORTNESS OF BREATH: 0
BACK PAIN: 1
ABDOMINAL PAIN: 0

## 2022-09-08 ENCOUNTER — OFFICE VISIT (OUTPATIENT)
Dept: INTERNAL MEDICINE CLINIC | Facility: CLINIC | Age: 87
End: 2022-09-08
Payer: MEDICARE

## 2022-09-08 VITALS
DIASTOLIC BLOOD PRESSURE: 80 MMHG | BODY MASS INDEX: 22.16 KG/M2 | WEIGHT: 133 LBS | SYSTOLIC BLOOD PRESSURE: 142 MMHG | HEART RATE: 63 BPM | HEIGHT: 65 IN | TEMPERATURE: 97.1 F | OXYGEN SATURATION: 93 %

## 2022-09-08 DIAGNOSIS — Z00.00 MEDICARE ANNUAL WELLNESS VISIT, SUBSEQUENT: Primary | ICD-10-CM

## 2022-09-08 DIAGNOSIS — Z23 NEED FOR PROPHYLACTIC VACCINATION AGAINST STREPTOCOCCUS PNEUMONIAE (PNEUMOCOCCUS): ICD-10-CM

## 2022-09-08 PROBLEM — M85.80 OSTEOPENIA: Status: ACTIVE | Noted: 2017-04-26

## 2022-09-08 PROBLEM — J34.3 HYPERTROPHY OF NASAL TURBINATES: Status: ACTIVE | Noted: 2017-12-07

## 2022-09-08 PROBLEM — J34.2 DEVIATED NASAL SEPTUM: Status: ACTIVE | Noted: 2017-12-07

## 2022-09-08 PROCEDURE — 1123F ACP DISCUSS/DSCN MKR DOCD: CPT | Performed by: NURSE PRACTITIONER

## 2022-09-08 PROCEDURE — G0009 ADMIN PNEUMOCOCCAL VACCINE: HCPCS | Performed by: NURSE PRACTITIONER

## 2022-09-08 PROCEDURE — 90677 PCV20 VACCINE IM: CPT | Performed by: NURSE PRACTITIONER

## 2022-09-08 PROCEDURE — G0439 PPPS, SUBSEQ VISIT: HCPCS | Performed by: NURSE PRACTITIONER

## 2022-09-08 ASSESSMENT — PATIENT HEALTH QUESTIONNAIRE - PHQ9
SUM OF ALL RESPONSES TO PHQ QUESTIONS 1-9: 0
2. FEELING DOWN, DEPRESSED OR HOPELESS: 0
SUM OF ALL RESPONSES TO PHQ9 QUESTIONS 1 & 2: 0
1. LITTLE INTEREST OR PLEASURE IN DOING THINGS: 0
SUM OF ALL RESPONSES TO PHQ QUESTIONS 1-9: 0

## 2022-09-08 ASSESSMENT — LIFESTYLE VARIABLES
HOW OFTEN DO YOU HAVE A DRINK CONTAINING ALCOHOL: 2-4 TIMES A MONTH
HOW MANY STANDARD DRINKS CONTAINING ALCOHOL DO YOU HAVE ON A TYPICAL DAY: 1 OR 2

## 2022-09-08 NOTE — PROGRESS NOTES
Medicare Annual Wellness Visit    Jeff Coronado is here for Medicare AWV    Assessment & Plan   Medicare annual wellness visit, subsequent  Need for prophylactic vaccination against Streptococcus pneumoniae (pneumococcus)  -     Pneumococcal Conjugate PCV20, PF (Prevnar 20)    Recommendations for Preventive Services Due: see orders and patient instructions/AVS.  Recommended screening schedule for the next 5-10 years is provided to the patient in written form: see Patient Instructions/AVS.     Return for Medicare Annual Wellness Visit in 1 year. Subjective       Patient's complete Health Risk Assessment and screening values have been reviewed and are found in Flowsheets. The following problems were reviewed today and where indicated follow up appointments were made and/or referrals ordered. Positive Risk Factor Screenings with Interventions:    Fall Risk:  Do you feel unsteady or are you worried about falling? : no  2 or more falls in past year?: (!) yes  Fall with injury in past year?: (!) yes (hit head)   Fall Risk Interventions:    Home safety tips provided  Patient advised to follow-up in this office for further evaluation and treatment within 2 month(s)            General Health and ACP:  General  In general, how would you say your health is?: Very Good  In the past 7 days, have you experienced any of the following: New or Increased Pain, New or Increased Fatigue, Loneliness, Social Isolation, Stress or Anger?: (!) Yes  Select all that apply: (!) New or Increased Pain, Loneliness  Do you get the social and emotional support that you need?: Yes  Do you have a Living Will?: Yes    Advance Directives       Power of  Living Will ACP-Advance Directive ACP-Power of     Not on File Not on File Not on File Not on File          General Health Risk Interventions:  Loneliness: \"It's because I am bored. \"  Requested copy of health care directive              Objective   Vitals:    09/08/22 1016   BP: (!) 142/80   Site: Left Upper Arm   Position: Sitting   Cuff Size: Small Adult   Pulse: 63   Temp: 97.1 °F (36.2 °C)   TempSrc: Temporal   SpO2: 93%   Weight: 133 lb (60.3 kg)   Height: 5' 4.75\" (1.645 m)      Body mass index is 22.3 kg/m². No Known Allergies  Prior to Visit Medications    Medication Sig Taking? Authorizing Provider   indapamide (LOZOL) 1.25 MG tablet Take 1 tablet by mouth in the morning. Yes JAZMIN Madsen NP   metoprolol succinate (TOPROL XL) 50 MG extended release tablet Take 1 tablet by mouth in the morning. Yes JAZMIN Madsen NP   SYNTHROID 75 MCG tablet Take 1 tablet by mouth in the morning. Yes JAZMIN Madsen NP   b complex vitamins capsule Take 1 capsule by mouth in the morning.  Yes Historical Provider, MD   ibuprofen (ADVIL;MOTRIN) 200 MG tablet Take 100 mg by mouth every 6 hours as needed for Pain Yes Historical Provider, MD   Lifitegrast (XIIDRA) 5 % SOLN Place 1 drop into both eyes in the morning and at bedtime Yes Historical Provider, MD   Brimonidine Tartrate (LUMIFY) 0.025 % SOLN Place 1 drop into both eyes as needed Yes Historical Provider, MD   ALPRAZolam (XANAX) 0.25 MG tablet 1 po daily, prn anxiety Yes Ar Automatic Reconciliation   apixaban (ELIQUIS) 2.5 MG TABS tablet Take 1/2 tablet BID Yes Ar Automatic Reconciliation   coenzyme Q10 100 MG CAPS capsule Take 100 mg by mouth daily Yes Ar Automatic Reconciliation   conjugated estrogens (PREMARIN) 0.625 MG/GM vaginal cream Place 0.5 g vaginally every 7 days Yes Ar Automatic Reconciliation   mometasone (ELOCON) 0.1 % lotion APPLY TO SCALP TWICE DAILY FOR ITCH AS NEEDED Yes Ar Automatic Reconciliation       CareTeam (Including outside providers/suppliers regularly involved in providing care):   Patient Care Team:  JAZMIN Madsen NP as PCP - [address] [place] [address] JAZMIN Patel NP as PCP - [facility] Empaneled Provider     Reviewed and updated this visit:  Tobacco  Allergies  Meds Problems  Med Hx  Surg Hx  Soc Hx  Fam Hx

## 2022-09-08 NOTE — PATIENT INSTRUCTIONS
Personalized Preventive Plan for Aneta Joshi - 9/8/2022  Medicare offers a range of preventive health benefits. Some of the tests and screenings are paid in full while other may be subject to a deductible, co-insurance, and/or copay. Some of these benefits include a comprehensive review of your medical history including lifestyle, illnesses that may run in your family, and various assessments and screenings as appropriate. After reviewing your medical record and screening and assessments performed today your provider may have ordered immunizations, labs, imaging, and/or referrals for you. A list of these orders (if applicable) as well as your Preventive Care list are included within your After Visit Summary for your review. Other Preventive Recommendations:    A preventive eye exam performed by an eye specialist is recommended every 1-2 years to screen for glaucoma; cataracts, macular degeneration, and other eye disorders. A preventive dental visit is recommended every 6 months. Try to get at least 150 minutes of exercise per week or 10,000 steps per day on a pedometer . Order or download the FREE \"Exercise & Physical Activity: Your Everyday Guide\" from [employer]. Call 8-389.922.5912 or search [employer] online. You need 1256-7002 mg of calcium and 8243-7683 IU of vitamin D per day. It is possible to meet your calcium requirement with diet alone, but a vitamin D supplement is usually necessary to meet this goal.  When exposed to the sun, use a sunscreen that protects against both UVA and UVB radiation with an SPF of 30 or greater. Reapply every 2 to 3 hours or after sweating, drying off with a towel, or swimming. Always wear a seat belt when traveling in a car. Always wear a helmet when riding a bicycle or motorcycle.

## 2022-09-19 ENCOUNTER — TELEPHONE (OUTPATIENT)
Dept: INTERNAL MEDICINE CLINIC | Facility: CLINIC | Age: 87
End: 2022-09-19

## 2022-09-19 NOTE — TELEPHONE ENCOUNTER
Spoke with patient and tested positive for COVID this morning but having no symptoms except a runny nose per patient. She reports she had symptoms a week ago of cough but that is gone.   Patient asked was there anything she should be taking at this point and advised to continue to rest and push fluids, patient verbalized understanding/TD

## 2022-10-03 ENCOUNTER — HOSPITAL ENCOUNTER (OUTPATIENT)
Dept: LAB | Age: 87
Discharge: HOME | End: 2022-10-06
Payer: MEDICARE

## 2022-10-03 DIAGNOSIS — D47.2 IGG MONOCLONAL PROTEIN DISORDER: ICD-10-CM

## 2022-10-03 LAB
ALBUMIN SERPL-MCNC: 3.2 G/DL (ref 3.2–4.6)
ALBUMIN/GLOB SERPL: 0.8 {RATIO} (ref 1.2–3.5)
ALP SERPL-CCNC: 80 U/L (ref 50–136)
ALT SERPL-CCNC: 16 U/L (ref 12–65)
ANION GAP SERPL CALC-SCNC: 4 MMOL/L (ref 4–13)
AST SERPL-CCNC: 17 U/L (ref 15–37)
BASOPHILS # BLD: 0 K/UL (ref 0–0.2)
BASOPHILS NFR BLD: 0 % (ref 0–2)
BILIRUB SERPL-MCNC: 0.6 MG/DL (ref 0.2–1.1)
BUN SERPL-MCNC: 23 MG/DL (ref 8–23)
CALCIUM SERPL-MCNC: 9.9 MG/DL (ref 8.3–10.4)
CHLORIDE SERPL-SCNC: 104 MMOL/L (ref 101–110)
CO2 SERPL-SCNC: 28 MMOL/L (ref 21–32)
CREAT SERPL-MCNC: 0.9 MG/DL (ref 0.6–1)
DIFFERENTIAL METHOD BLD: NORMAL
EOSINOPHIL # BLD: 0.1 K/UL (ref 0–0.8)
EOSINOPHIL NFR BLD: 1 % (ref 0.5–7.8)
ERYTHROCYTE [DISTWIDTH] IN BLOOD BY AUTOMATED COUNT: 13.8 % (ref 11.9–14.6)
GLOBULIN SER CALC-MCNC: 4.1 G/DL (ref 2.3–3.5)
GLUCOSE SERPL-MCNC: 110 MG/DL (ref 65–100)
HCT VFR BLD AUTO: 38.1 % (ref 35.8–46.3)
HGB BLD-MCNC: 12.1 G/DL (ref 11.7–15.4)
IMM GRANULOCYTES # BLD AUTO: 0 K/UL (ref 0–0.5)
IMM GRANULOCYTES NFR BLD AUTO: 1 % (ref 0–5)
LYMPHOCYTES # BLD: 1 K/UL (ref 0.5–4.6)
LYMPHOCYTES NFR BLD: 19 % (ref 13–44)
MCH RBC QN AUTO: 29.4 PG (ref 26.1–32.9)
MCHC RBC AUTO-ENTMCNC: 31.8 G/DL (ref 31.4–35)
MCV RBC AUTO: 92.7 FL (ref 79.6–97.8)
MONOCYTES # BLD: 0.5 K/UL (ref 0.1–1.3)
MONOCYTES NFR BLD: 10 % (ref 4–12)
NEUTS SEG # BLD: 3.7 K/UL (ref 1.7–8.2)
NEUTS SEG NFR BLD: 69 % (ref 43–78)
NRBC # BLD: 0 K/UL (ref 0–0.2)
PLATELET # BLD AUTO: 279 K/UL (ref 150–450)
PMV BLD AUTO: 9.6 FL (ref 9.4–12.3)
POTASSIUM SERPL-SCNC: 3.8 MMOL/L (ref 3.5–5.1)
PROT SERPL-MCNC: 7.3 G/DL (ref 6.3–8.2)
RBC # BLD AUTO: 4.11 M/UL (ref 4.05–5.2)
SODIUM SERPL-SCNC: 136 MMOL/L (ref 136–145)
WBC # BLD AUTO: 5.4 K/UL (ref 4.3–11.1)

## 2022-10-03 PROCEDURE — 83521 IG LIGHT CHAINS FREE EACH: CPT

## 2022-10-03 PROCEDURE — 84166 PROTEIN E-PHORESIS/URINE/CSF: CPT

## 2022-10-03 PROCEDURE — 82784 ASSAY IGA/IGD/IGG/IGM EACH: CPT

## 2022-10-03 PROCEDURE — 85025 COMPLETE CBC W/AUTO DIFF WBC: CPT

## 2022-10-03 PROCEDURE — 36415 COLL VENOUS BLD VENIPUNCTURE: CPT

## 2022-10-04 LAB
KAPPA LC FREE SER-MCNC: 708.4 MG/L (ref 3.3–19.4)
KAPPA LC FREE/LAMBDA FREE SER: 58.07 {RATIO} (ref 0.26–1.65)
LAMBDA LC FREE SERPL-MCNC: 12.2 MG/L (ref 5.7–26.3)

## 2022-10-06 LAB
FLOW CYTOMETRY RESULTS: NORMAL
SPECIMEN SOURCE: NORMAL
TEST ORDERED: NORMAL

## 2022-10-08 LAB
ALBUMIN 24H MFR UR ELPH: NORMAL %
ALPHA1 GLOB 24H MFR UR ELPH: NORMAL %
ALPHA2 GLOB 24H MFR UR ELPH: NORMAL %
B-GLOBULIN MFR UR ELPH: NORMAL %
GAMMA GLOB 24H MFR UR ELPH: NORMAL %
INTERPRETATION UR IFE-IMP: NORMAL
PROT UR-MCNC: NORMAL MG/DL

## 2022-10-10 LAB
ALBUMIN SERPL ELPH-MCNC: 3.3 G/DL (ref 2.9–4.4)
ALBUMIN/GLOB SERPL: 1 {RATIO} (ref 0.7–1.7)
ALPHA1 GLOB SERPL ELPH-MCNC: 0.3 G/DL (ref 0–0.4)
ALPHA2 GLOB SERPL ELPH-MCNC: 0.9 G/DL (ref 0.4–1)
B-GLOBULIN SERPL ELPH-MCNC: 1 G/DL (ref 0.7–1.3)
GAMMA GLOB SERPL ELPH-MCNC: 1.3 G/DL (ref 0.4–1.8)
GLOBULIN SER-MCNC: 3.4 G/DL (ref 2.2–3.9)
IGA SERPL-MCNC: 92 MG/DL (ref 64–422)
IGG SERPL-MCNC: 1426 MG/DL (ref 586–1602)
IGM SERPL-MCNC: 37 MG/DL (ref 26–217)
INTERPRETATION SERPL IEP-IMP: ABNORMAL
M PROTEIN SERPL ELPH-MCNC: 0.9 G/DL
PROT SERPL-MCNC: 6.7 G/DL (ref 6–8.5)

## 2022-10-12 DIAGNOSIS — D47.2 IGG MONOCLONAL PROTEIN DISORDER: Primary | ICD-10-CM

## 2022-10-17 ENCOUNTER — OFFICE VISIT (OUTPATIENT)
Dept: ONCOLOGY | Age: 87
End: 2022-10-17
Payer: MEDICARE

## 2022-10-17 VITALS
BODY MASS INDEX: 22.16 KG/M2 | HEART RATE: 68 BPM | TEMPERATURE: 98.2 F | DIASTOLIC BLOOD PRESSURE: 70 MMHG | HEIGHT: 65 IN | OXYGEN SATURATION: 97 % | SYSTOLIC BLOOD PRESSURE: 119 MMHG | RESPIRATION RATE: 14 BRPM | WEIGHT: 133 LBS

## 2022-10-17 DIAGNOSIS — D47.2 MONOCLONAL GAMMOPATHY: Primary | ICD-10-CM

## 2022-10-17 PROCEDURE — G8427 DOCREV CUR MEDS BY ELIG CLIN: HCPCS | Performed by: NURSE PRACTITIONER

## 2022-10-17 PROCEDURE — 99214 OFFICE O/P EST MOD 30 MIN: CPT | Performed by: NURSE PRACTITIONER

## 2022-10-17 PROCEDURE — 1036F TOBACCO NON-USER: CPT | Performed by: NURSE PRACTITIONER

## 2022-10-17 PROCEDURE — G8484 FLU IMMUNIZE NO ADMIN: HCPCS | Performed by: NURSE PRACTITIONER

## 2022-10-17 PROCEDURE — G8420 CALC BMI NORM PARAMETERS: HCPCS | Performed by: NURSE PRACTITIONER

## 2022-10-17 PROCEDURE — 1123F ACP DISCUSS/DSCN MKR DOCD: CPT | Performed by: NURSE PRACTITIONER

## 2022-10-17 PROCEDURE — 1090F PRES/ABSN URINE INCON ASSESS: CPT | Performed by: NURSE PRACTITIONER

## 2022-10-17 RX ORDER — ACETAMINOPHEN 325 MG/1
650 TABLET ORAL EVERY 6 HOURS PRN
COMMUNITY

## 2022-10-17 ASSESSMENT — ENCOUNTER SYMPTOMS
CHEST TIGHTNESS: 0
TROUBLE SWALLOWING: 0
BLOOD IN STOOL: 0
SHORTNESS OF BREATH: 0
WHEEZING: 0
ABDOMINAL DISTENTION: 0
VOICE CHANGE: 0
SCLERAL ICTERUS: 0
NAUSEA: 0
DIARRHEA: 0
SORE THROAT: 0
HEMOPTYSIS: 0
CONSTIPATION: 0
VOMITING: 0
ABDOMINAL PAIN: 0

## 2022-10-17 ASSESSMENT — PATIENT HEALTH QUESTIONNAIRE - PHQ9
SUM OF ALL RESPONSES TO PHQ QUESTIONS 1-9: 0
2. FEELING DOWN, DEPRESSED OR HOPELESS: 0
SUM OF ALL RESPONSES TO PHQ QUESTIONS 1-9: 0

## 2022-10-17 NOTE — PROGRESS NOTES
[facility] Hematology and Oncology: Established patient - follow up     Chief Complaint   Patient presents with    Follow-up     Chronological Events:  Reason for Referral: Monoclonal gammopathy   Referring Provider: Hari Vargas MD   Primary Care Provider: Kobe Kolb MD   Family History of Cancer/Hematologic Disorders: Family history is significant for sister with breast cancer at the age of 80. Presenting Symptoms: No relevant physical symptoms reported. History of Present Illness:  Ms. Olegario Steen is a 80 y.o. female who presents today for follow up regarding MGUS. The past medical history is significant for vitamin D deficiency, PAF on AC, osteopenia, osteoarthritis,  occlusion and stenosis of carotid artery, HLD, mitral valve disorder, malignant neoplasm of rectosigmoid junction, IBS, HTN,  ovarian cancer, colon polyps, Graves disease, GERD, diverticulosis, bilateral carotid artery stenosis, aortic regurgitation, anxiety disorder, total colectomy, tonsillectomy, thyroidectomy, ROSALIA/BSO,  small bowel resection, heart catheterization, cyst I&D, cataract removal, vitrectomy, and orthopedic surgery. She was evaluated in consultation by Rheumatologist, Dr. Mary Alice Irwin, on 11/4/21 after being referred by her PCP for evaluation of osteoporosis. Review of systems and physical exam were unremarkable. She was treated with Prolia with her first injection on 11/4/21 with plans for 60 mg Prolia injection every 6 months. 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 Ms. Olegario Steen was subsequently referred to [facility], per Rheumatology, for hematology evaluation and treatment of  monoclonal gammopathy. She is here today for follow up and surveillance.   Labs reviewed from 10/3 and M-spike is stable, kappa LC and FLC ratio has increased from previous - will make her a sooner f/u in 3 months. Counts and Cr are stable/adequate. No B symptoms. She does have back pain/joint aches r/t osteoarthritis, sees ortho. She is planning to see pain management soon to see if she can have an injection for her back. She has been eating/drinking well and her weight is stable. She denies any shortness of breath. She denies any fevers or other infecitous symptoms aside from COVID ~ 1 month ago from which she has recovered. Chronological Events:   PROTEIN ELECTROPHORESIS W/REFLEX PHAM 11/4/21            IMMUNOFIXATION 11/4/21          Ref Range & Units  11/4/21 1330         Immunoglobulin G, Qt.  586 - 1,602 mg/dL  1,227          Immunoglobulin A, Qt.  64 - 422 mg/dL  93      Immunoglobulin M, Qt.  26 - 217 mg/dL  32      Immunofixation Result     Comment Abnormal         Comment: Immunofixation shows IgG monoclonal protein with kappa light chain   specificity. Other Pertinent Information:   03/23/2021 (COVID-19, Pfizer Purple top, DILUTE for use, 12+ yrs, 30mcg/0.3mL dose)   04/13/2021 (COVID-19, Pfizer Purple top, DILUTE for use, 12+ yrs, 30mcg/0.3mL dose)      1/2022 heme/onc consultation    1/27/22 bone survey - neg for lytic lesions    2/9/22 FU - labs reviewed; p/w BmBx    2/24/22 BMBx and aspiration c/w MGUS    3/14/22 FU after BMbx - MGUS; cw surveillance   6/2022 FU - labs stable, c/w surveillance, KL ratio increased and will have her f/u in 3 months instead of 4-5. She is in agreement. Family History   Problem Relation Age of Onset    Rheum Arthritis Son     Diabetes Father     Diabetes Other     Hypertension Other     Heart Disease Other     Stroke Sister     Breast Cancer Sister 80    Cancer Sister     Heart Failure Father     Stroke Other     Osteoporosis Mother     Heart Failure Mother     Stroke Mother     Heart Disease Mother     Stroke Father     Thyroid Disease Neg Hx       Social History     Socioeconomic History    Marital status:   Spouse name: None    Number of children: None    Years of education: None    Highest education level: None   Tobacco Use    Smoking status: Never    Smokeless tobacco: Never   Vaping Use    Vaping Use: Never used   Substance and Sexual Activity    Alcohol use: Yes    Drug use: No     Types: Prescription, OTC     Social Determinants of Health     Physical Activity: Insufficiently Active    Days of Exercise per Week: 1 day    Minutes of Exercise per Session: 20 min        Review of Systems   Constitutional:  Negative for appetite change, chills, diaphoresis, fatigue, fever and unexpected weight change. HENT:   Negative for hearing loss, mouth sores, nosebleeds, sore throat, trouble swallowing and voice change. Eyes:  Negative for icterus. Respiratory:  Negative for chest tightness, hemoptysis, shortness of breath and wheezing. Cardiovascular:  Negative for chest pain, leg swelling and palpitations. Gastrointestinal:  Negative for abdominal distention, abdominal pain, blood in stool, constipation, diarrhea, nausea and vomiting. Endocrine: Negative for hot flashes. Genitourinary:  Negative for difficulty urinating, frequency, vaginal bleeding and vaginal discharge. Musculoskeletal:  Positive for arthralgias. Negative for flank pain, gait problem and myalgias. Skin:  Negative for itching, rash and wound. Neurological:  Negative for dizziness, extremity weakness, gait problem, headaches and numbness. Psychiatric/Behavioral:  Negative for confusion and depression. The patient is nervous/anxious.        No Known Allergies  Past Medical History:   Diagnosis Date    Anxiety disorder     Aortic regurgitation     Asymptomatic bilateral carotid artery stenosis     Diverticulosis of large intestine     GERD (gastroesophageal reflux disease)     Graves disease     History of colon polyps     History of herpes zoster     History of ovarian cancer     Hypertension     Irritable bowel syndrome 0.1 % lotion APPLY TO SCALP TWICE DAILY FOR ITCH AS NEEDED      ibuprofen (ADVIL;MOTRIN) 200 MG tablet Take 100 mg by mouth every 6 hours as needed for Pain (Patient not taking: Reported on 10/17/2022)      ALPRAZolam (XANAX) 0.25 MG tablet 1 po daily, prn anxiety (Patient not taking: Reported on 10/17/2022)       No current facility-administered medications for this visit. No flowsheet data found. OBJECTIVE:  /70 (Site: Left Upper Arm, Position: Standing)   Pulse 68   Temp 98.2 °F (36.8 °C)   Resp 14   Ht 5' 4.75\" (1.645 m)   Wt 133 lb (60.3 kg)   SpO2 97%   BMI 22.30 kg/m²       ECOG PERFORMANCE STATUS - 1- Restricted in physically strenuous activity but ambulatory and able to carry out work of a light or sedentary nature such as light house work, office work. Pain - /10. None/Minimal pain - not affecting QOL     Fatigue - No flowsheet data found. Distress - No flowsheet data found. Physical Exam  Vitals reviewed. Exam conducted with a chaperone present. Constitutional:       General: She is not in acute distress. Appearance: Normal appearance. She is normal weight. She is not ill-appearing or toxic-appearing. HENT:      Head: Normocephalic and atraumatic. Nose: Nose normal. No congestion. Mouth/Throat:      Mouth: Mucous membranes are moist.   Eyes:      General: No scleral icterus. Extraocular Movements: Extraocular movements intact. Conjunctiva/sclera: Conjunctivae normal.      Pupils: Pupils are equal, round, and reactive to light. Cardiovascular:      Rate and Rhythm: Normal rate and regular rhythm. Heart sounds: No murmur heard. Pulmonary:      Effort: Pulmonary effort is normal. No respiratory distress. Breath sounds: Normal breath sounds. No wheezing or rales. Abdominal:      General: There is no distension. Palpations: Abdomen is soft. There is no mass. Tenderness: There is no abdominal tenderness.  There is no guarding or rebound. Musculoskeletal:      Right lower leg: No edema. Left lower leg: No edema. Lymphadenopathy:      Cervical: No cervical adenopathy. Upper Body:      Right upper body: No supraclavicular or axillary adenopathy. Left upper body: No supraclavicular or axillary adenopathy. Skin:     General: Skin is warm and dry. Coloration: Skin is not jaundiced or pale. Findings: No rash. Neurological:      General: No focal deficit present. Mental Status: She is alert and oriented to person, place, and time. Psychiatric:         Behavior: Behavior normal.         Thought Content: Thought content normal.        Labs:  No results found for this or any previous visit (from the past 168 hour(s)). Imaging: reviewed     Pathology:    2/2022 6/2022        ASSESSMENT:     Diagnosis Orders   1. Monoclonal gammopathy            Ms. Humphrey Chew is here for FU of MGUS. 1. IgG kappa M spike 0.8, Cr <2, Hb >10, Ca normal, skeletal survey neg        - BMbx c/w MGUS    - can take B12 supplement - 500-1000 daily   - counts are stable, M-spike stable at 0.9, KLC increased from previous and KL ratio increased to 58 from 29, will have her return a bit sooner 3 months to recheck      RTC 3 mo with labs 2 weeks prior or sooner as needed      MDM      Lab studies and imaging studies were personally reviewed. Pertinent old records were reviewed. Historical:    - At this time, I suspect she may have MGUS. Risk of conversion to multiple myeloma is about 1 %/year. We will complete the work-up including CBC, CMP, SPEP, FLC, UPEP, LDH, beta-2 microglobulin and  CRP at this time. She also is willing to proceed with skeletal survey. - we reviewed the pathophysiology of hematopoiesis as well as plasma cell disorders. Using visual aids I reviewed the dx criteria of multiple myeloma. All questions were asked and answered to the best of my ability.   The patient verbalized understanding and agrees with the plan above.           Martha Kawasaki, APRN - NP  [place] [facility] Hematology and Oncology  [id number] [place] [address]  Office : [phone]  Fax : [phone]

## 2022-11-03 ENCOUNTER — OFFICE VISIT (OUTPATIENT)
Dept: INTERNAL MEDICINE CLINIC | Facility: CLINIC | Age: 87
End: 2022-11-03
Payer: MEDICARE

## 2022-11-03 VITALS
HEART RATE: 51 BPM | OXYGEN SATURATION: 97 % | BODY MASS INDEX: 21.97 KG/M2 | TEMPERATURE: 97.2 F | WEIGHT: 131 LBS | SYSTOLIC BLOOD PRESSURE: 120 MMHG | DIASTOLIC BLOOD PRESSURE: 64 MMHG

## 2022-11-03 DIAGNOSIS — Z23 NEED FOR IMMUNIZATION AGAINST INFLUENZA: ICD-10-CM

## 2022-11-03 DIAGNOSIS — Z79.01 CHRONIC ANTICOAGULATION: ICD-10-CM

## 2022-11-03 DIAGNOSIS — I48.0 PAF (PAROXYSMAL ATRIAL FIBRILLATION) (HCC): ICD-10-CM

## 2022-11-03 DIAGNOSIS — E03.9 PRIMARY HYPOTHYROIDISM: Primary | ICD-10-CM

## 2022-11-03 PROBLEM — E04.1 THYROID NODULE: Status: ACTIVE | Noted: 2022-11-03

## 2022-11-03 PROCEDURE — G8484 FLU IMMUNIZE NO ADMIN: HCPCS | Performed by: NURSE PRACTITIONER

## 2022-11-03 PROCEDURE — 99214 OFFICE O/P EST MOD 30 MIN: CPT | Performed by: NURSE PRACTITIONER

## 2022-11-03 PROCEDURE — 90694 VACC AIIV4 NO PRSRV 0.5ML IM: CPT | Performed by: NURSE PRACTITIONER

## 2022-11-03 PROCEDURE — 1036F TOBACCO NON-USER: CPT | Performed by: NURSE PRACTITIONER

## 2022-11-03 PROCEDURE — G8420 CALC BMI NORM PARAMETERS: HCPCS | Performed by: NURSE PRACTITIONER

## 2022-11-03 PROCEDURE — G0008 ADMIN INFLUENZA VIRUS VAC: HCPCS | Performed by: NURSE PRACTITIONER

## 2022-11-03 PROCEDURE — G8427 DOCREV CUR MEDS BY ELIG CLIN: HCPCS | Performed by: NURSE PRACTITIONER

## 2022-11-03 PROCEDURE — 1123F ACP DISCUSS/DSCN MKR DOCD: CPT | Performed by: NURSE PRACTITIONER

## 2022-11-03 PROCEDURE — 1090F PRES/ABSN URINE INCON ASSESS: CPT | Performed by: NURSE PRACTITIONER

## 2022-11-03 RX ORDER — COVID-19 ANTIGEN TEST
KIT MISCELLANEOUS
COMMUNITY
Start: 2022-09-19 | End: 2022-11-03

## 2022-11-03 RX ORDER — LEVOTHYROXINE SODIUM 75 MCG
75 TABLET ORAL DAILY
Qty: 90 TABLET | Refills: 3 | Status: SHIPPED | OUTPATIENT
Start: 2022-11-03

## 2022-11-03 ASSESSMENT — PATIENT HEALTH QUESTIONNAIRE - PHQ9
SUM OF ALL RESPONSES TO PHQ QUESTIONS 1-9: 0
SUM OF ALL RESPONSES TO PHQ QUESTIONS 1-9: 0
1. LITTLE INTEREST OR PLEASURE IN DOING THINGS: 0
SUM OF ALL RESPONSES TO PHQ QUESTIONS 1-9: 0
SUM OF ALL RESPONSES TO PHQ9 QUESTIONS 1 & 2: 0
2. FEELING DOWN, DEPRESSED OR HOPELESS: 0
SUM OF ALL RESPONSES TO PHQ QUESTIONS 1-9: 0

## 2022-11-03 NOTE — PROGRESS NOTES
PROGRESS NOTE    SUBJECTIVE:   Ledy Fang is a 80 y.o. female seen for a follow up visit for   Chief Complaint   Patient presents with    Follow-up Chronic Condition       HPI  Hypothyroidism  Patient complains of hypothyroidism. Current symptoms: none. Patient denies change in energy level, nervousness, palpitations, and weight changes. Onset of symptoms was several years ago. Symptoms have been basically asymptomatic. Hypothyroid Graves Disease -- ophthalmic issues. Right shoulder bursitis - drained x5 times and returns. Also gets steroid injection into bursa. Osteoporosis -- reclast.    PAF -- when I eat too much and hae gas on stomach increase heart rate and when I stop burping it slows back down. Bilateral leg pain -- usually takes tylenol but rarely takes 1/2 advil. Total discomfort and stiffness of BLE from waist down. Last 3 to 4 months fighting to get out of bed due to pain across the lower back. Pain management -- ordered MRI of low back. Has had lumbar x-ray. Referred to pain management by Dr. Marti Rodriguez (ortho -- [facility])    Lump --  over left shoulder. Ultrasound at ortho. Described as collection of blood. Reviewed and updated this visit by provider:  Tobacco  Allergies  Meds  Problems  Med Hx  Surg Hx  Fam Hx         Review of Systems   Constitutional:  Negative for chills, fatigue and fever. Respiratory:  Negative for chest tightness, shortness of breath and wheezing. Cardiovascular:  Negative for chest pain, palpitations and leg swelling. Gastrointestinal:  Negative for abdominal pain. Endocrine: Negative for polydipsia, polyphagia and polyuria. Genitourinary:  Negative for frequency. Musculoskeletal:  Positive for arthralgias, back pain and joint swelling. Negative for gait problem. Skin:  Negative for rash. Neurological:  Negative for weakness, numbness and headaches.    Psychiatric/Behavioral:  Negative for dysphoric mood. The patient is not nervous/anxious. OBJECTIVE:    /64 (Site: Left Upper Arm, Position: Sitting, Cuff Size: Small Adult)   Pulse 51   Temp 97.2 °F (36.2 °C) (Temporal)   Wt 131 lb (59.4 kg)   SpO2 97%   BMI 21.97 kg/m²      Physical Exam  Vitals and nursing note reviewed. Constitutional:       Appearance: Normal appearance. She is well-groomed. HENT:      Head: Normocephalic. Right Ear: Hearing and external ear normal.      Left Ear: Hearing and external ear normal.      Mouth/Throat:      Lips: Pink. Mouth: Mucous membranes are moist.   Eyes:      General: Lids are normal.      Conjunctiva/sclera: Conjunctivae normal.   Cardiovascular:      Rate and Rhythm: Normal rate and regular rhythm. Pulmonary:      Effort: Pulmonary effort is normal.      Breath sounds: Normal breath sounds. Musculoskeletal:        Back:       Right lower leg: No edema. Left lower leg: No edema. Comments: Cyst-like structure on right 4th finger between DIP and MIP. 7 cm x 4 cm x 2 cm height cyst-like structure. Skin:     General: Skin is warm and dry. Findings: No rash. Neurological:      Mental Status: She is alert and oriented to person, place, and time. Cranial Nerves: No dysarthria or facial asymmetry. Motor: Motor function is intact. Gait: Gait is intact. Gait normal.   Psychiatric:         Attention and Perception: Attention normal.         Mood and Affect: Mood normal.         Behavior: Behavior normal. Behavior is cooperative. ASSESSMENT and PLAN    1. Primary hypothyroidism  Last TSH was 3.120. We will continue same branded Synthroid 75 mcg daily. Obtain TSH before next visit.  -     SYNTHROID 75 MCG tablet; Take 1 tablet by mouth Daily, Disp-90 tablet, R-3, DAWNormal  -     TSH; Future  2. PAF (paroxysmal atrial fibrillation) (HCC)  Rate is controlled and she is compliant to anticoagulation with Eliquis 2.5 every 12 hours.   3. Chronic anticoagulation  As above on Eliquis 2.5 mg every 12 hours. Hemoglobin and hematocrit are within normal limits. She is reporting a \"lump\" left upper shoulder also reports having ultrasound and is a collection of blood. Reviewed ortho notes from [facility]. Suspected to be from spontaneous bleeding. 4. Need for immunization against influenza  -     Influenza, FLUAD, (age 72 y+), IM, Preservative Free, 0.5 mL      Return in about 3 months (around 2/3/2023) for Follow-Up, Hypothyroidism, with labs. current treatment plan is effective, no change in therapy  lab results and schedule of future lab studies reviewed with patient  reviewed diet, exercise and weight control  cardiovascular risk and specific lipid/LDL goals reviewed  reviewed medications and side effects in detail        JAZMIN Meza NP    Dictated using voice recognition software.  Proofread, but unrecognized voice recognition errors may exist.

## 2022-11-14 ENCOUNTER — HOSPITAL ENCOUNTER (OUTPATIENT)
Dept: MAMMOGRAPHY | Age: 87
Discharge: HOME | End: 2022-11-17

## 2022-11-14 DIAGNOSIS — Z12.31 OTHER SCREENING MAMMOGRAM: ICD-10-CM

## 2022-11-30 ENCOUNTER — TELEPHONE (OUTPATIENT)
Dept: ONCOLOGY | Age: 87
End: 2022-11-30

## 2022-11-30 ENCOUNTER — TELEPHONE (OUTPATIENT)
Dept: RHEUMATOLOGY | Age: 87
End: 2022-11-30

## 2022-11-30 NOTE — TELEPHONE ENCOUNTER
Patient had one injection of Prolia and had adverse effects. Switched to Reclast and infused on 5/5/22. Will be due May 2023. OV with Dr. Coretta Zarate 5/11/23 with Reclast. Check benefits prior to appt.

## 2022-11-30 NOTE — TELEPHONE ENCOUNTER
Pt called stating that she had an MRI through pain management done and she would like to know what the next steps are for her in Dr. Hortensia Tomas office.

## 2022-11-30 NOTE — TELEPHONE ENCOUNTER
----- Message from Shane Salazar MA sent at 11/18/2022  3:18 PM EST -----  Regarding: prolia  Needs prolia now- due 11/4/22.  ARLYN pt

## 2022-11-30 NOTE — TELEPHONE ENCOUNTER
I reviewed the chart and the MRI results are in care everywhere under [facility].  Msg to Dr Manasa Chamberlain and Iesha Montaño RN

## 2022-12-01 ENCOUNTER — OFFICE VISIT (OUTPATIENT)
Dept: CARDIOLOGY CLINIC | Age: 87
End: 2022-12-01

## 2022-12-01 VITALS
HEART RATE: 61 BPM | DIASTOLIC BLOOD PRESSURE: 68 MMHG | SYSTOLIC BLOOD PRESSURE: 130 MMHG | BODY MASS INDEX: 25.87 KG/M2 | HEIGHT: 60 IN | WEIGHT: 131.8 LBS

## 2022-12-01 DIAGNOSIS — M25.00 HEMARTHROSIS: ICD-10-CM

## 2022-12-01 DIAGNOSIS — I48.0 PAF (PAROXYSMAL ATRIAL FIBRILLATION) (HCC): Chronic | ICD-10-CM

## 2022-12-01 DIAGNOSIS — R93.7 ABNORMAL MRI, SPINE: ICD-10-CM

## 2022-12-01 DIAGNOSIS — I45.2 RBBB (RIGHT BUNDLE BRANCH BLOCK WITH LEFT ANTERIOR FASCICULAR BLOCK): ICD-10-CM

## 2022-12-01 DIAGNOSIS — I10 PRIMARY HYPERTENSION: Primary | Chronic | ICD-10-CM

## 2022-12-01 ASSESSMENT — ENCOUNTER SYMPTOMS
SHORTNESS OF BREATH: 0
BACK PAIN: 1

## 2022-12-01 NOTE — PROGRESS NOTES
[address], [address] [place] [address], [address] [place] [address]  PHONE: [phone]    Rosetta Felix  1934      SUBJECTIVE:   Rosetta Felix is a 80 y.o. female seen for a follow up visit regarding the following:     Chief Complaint   Patient presents with    Atrial Fibrillation    Hypertension       HPI:    Patient presents for follow-up. She was last seen old. Multiple issues were addressed at today's visit. Paroxysmal atrial fibrillation: No recent symptoms. Eliquis 1/2 tablet BID. Hemarthrosis: No recent symptoms. Hypertension:  BP controlled. RBBB:  No dizzines or syncope. Prior falls:  No recent falls. MRI for back pain:  Showed:  1. Marked progressive degenerative spondylosis and facet arthropathy of the lumbar spine as compared with MRI from 2007. There are multiple potential pain generators present on this examination. 2.  Intense edema and enhancement of the L5 and S1 vertebral bodies with fluid equivalent signal at the disc space at L5-S1 and adjacent endplate enhancement present. Although this could reflect advanced degenerative spondylosis without acute inflammatory changes related to underlying osteoporosis, discitis and osteomyelitis is not excluded based on imaging findings. No significant paravertebral soft tissue enhancement identified however. 3. Extensive heterogeneous marrow signal involving the left ilium and sclerotic focus left sacral ala could reflect sequelae of chronic osseous metastatic disease given the history of colon and ovarian cancer. There is paucity of contemporary cross-sectional imaging of the lumbar spine and pelvis for which to compare. 4. Evidence of chronic sacral insufficiency fractures at the cephalad and caudal margin of S2 which demonstrate healing.    5. Severe central stenosis is present at the L3-4 level related to severe facet arthropathy and degenerative spondylosis with complete effacement of CSF fluid about the nerve roots. 6. Multilevel neural foraminal encroachment related to endplate and facet osteophyte formation accentuated by the rotatory subluxation due to the scoliosis. This is most notable inferiorly in the lumbar spine on the left and at the mid to upper lumbar spine on the right. 7. Enhancing circumscribed mass along posterior margin of the left psoas muscle belly at the L5 level. This is new from prior exam. Metastatic focus could have this appearance. Pattern could reflect a nerve sheath tumor however. This lesion is indeterminate. Past Medical History, Past Surgical History, Family history, Social History, and Medications were all reviewed with the patient today and updated as necessary.            Current Outpatient Medications:     bimatoprost (LUMIGAN) 0.01 % SOLN ophthalmic drops, Place 1 drop into both eyes nightly, Disp: , Rfl:     SYNTHROID 75 MCG tablet, Take 1 tablet by mouth Daily, Disp: 90 tablet, Rfl: 3    acetaminophen (TYLENOL) 325 MG tablet, Take 650 mg by mouth every 6 hours as needed for Pain, Disp: , Rfl:     indapamide (LOZOL) 1.25 MG tablet, Take 1 tablet by mouth in the morning., Disp: 90 tablet, Rfl: 3    metoprolol succinate (TOPROL XL) 50 MG extended release tablet, Take 1 tablet by mouth in the morning., Disp: 90 tablet, Rfl: 3    b complex vitamins capsule, Take 1 capsule by mouth in the morning., Disp: , Rfl:     ibuprofen (ADVIL;MOTRIN) 200 MG tablet, Take 100 mg by mouth every 6 hours as needed for Pain, Disp: , Rfl:     Lifitegrast (XIIDRA) 5 % SOLN, Place 1 drop into both eyes in the morning and at bedtime, Disp: , Rfl:     Brimonidine Tartrate (LUMIFY) 0.025 % SOLN, Place 1 drop into both eyes as needed, Disp: , Rfl:     ALPRAZolam (XANAX) 0.25 MG tablet, 1 po daily, prn anxiety, Disp: , Rfl:     apixaban (ELIQUIS) 2.5 MG TABS tablet, Take 1/2 tablet BID, Disp: , Rfl:     coenzyme Q10 100 MG CAPS capsule, Take 100 mg by mouth daily, Disp: , Rfl: conjugated estrogens (PREMARIN) 0.625 MG/GM vaginal cream, Place 0.5 g vaginally every 7 days, Disp: , Rfl:     mometasone (ELOCON) 0.1 % lotion, APPLY TO SCALP TWICE DAILY FOR ITCH AS NEEDED, Disp: , Rfl:      No Known Allergies     Patient Active Problem List    Diagnosis Date Noted    Thyroid nodule 11/03/2022     Priority: Medium    Deviated nasal septum 12/07/2017     Priority: Medium     Formatting of this note might be different from the original.  Added automatically from request for surgery [id number]      Hypertrophy of nasal turbinates 12/07/2017     Priority: Medium     Formatting of this note might be different from the original.  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     Priority: Low    Hypovitaminosis D 04/30/2019     Priority: Low    Osteoporosis without current pathological fracture 04/30/2019     Priority: Low    Benign paroxysmal positional vertigo 06/21/2018     Priority: Low    On potassium wasting diuretic therapy 03/13/2018     Priority: Low    Elevated hemoglobin A1c 12/05/2017     Priority: Low    Hyperglycemia, drug-induced 08/29/2017     Priority: Low    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  10/12/2015: TSH 0.005, free T4 2.61.  12/8/2015: TSH less than 0.005, free T4 2.8.  1/18/2016: TSH 0.362, free T4 0.58, thyroid peroxidase antibody 6   (negative), [place] globulin antibodies less than 1.0 (negative). .  1/28/2016: Free T3 3.1.  4/15/2016: TSH 4.550, free T4 0.56. Hypertension      Priority: Low    GERD (gastroesophageal reflux disease)      Priority: Low     as needed medication         Chronic anticoagulation 07/26/2016     Priority: Low    Hemarthrosis 06/02/2016     Priority: Low    [facility] 12/08/2015     Priority: Low     1. Admitted with atrial fib s/p DCCV. Started on amiodarone 12/15. 2.  Abnormal thyroid function. Amiodarone stopped and flecainide started 3/21/16. 3.  Flecainide stopped due to conduction system disease 5/19. Tried Multaq but severe diarrhea. Plan to try increased B-blocker dose. Hypercholesterolemia 10/16/2015     Priority: Low    Postmenopausal atrophic vaginitis 06/24/2015     Priority: Low    Anxiety 06/23/2015     Priority: Low    Arthritis      Priority: Low     osteo. generalized        Asymptomatic bilateral carotid artery stenosis 08/25/2014     Priority: Low    Aortic regurgitation 04/28/2014     Priority: Low     1.  Echo (2/16/16): Mild to moderate aortic regurgitation. Diverticulosis of large intestine 12/23/2013     Priority: Low     Hx of diverticulitis            Social History     Tobacco Use    Smoking status: Never    Smokeless tobacco: Never   Substance Use Topics    Alcohol use: Yes       ROS:    Review of Systems   Constitutional: Negative for malaise/fatigue. Cardiovascular:  Negative for chest pain. Respiratory:  Negative for shortness of breath. Musculoskeletal:  Positive for arthritis and back pain. Neurological:  Negative for focal weakness. Psychiatric/Behavioral:  Negative for depression.           PHYSICAL EXAM:  Wt Readings from Last 3 Encounters:   12/01/22 131 lb 12.8 oz (59.8 kg)   11/03/22 131 lb (59.4 kg)   10/17/22 133 lb (60.3 kg)     BP Readings from Last 3 Encounters:   12/01/22 130/68   11/03/22 120/64   10/17/22 119/70     Pulse fibrillation) [facility])  She has done very well off of antiarrhythmic therapy. Continue Toprol. On subtherapeutic Eliquis dose. We have discussed this on multiple occasions. She understands the risk of CVA. She has declined consideration of left atrial appendage occlusion.  - EKG 12 Lead    2. Primary hypertension  Blood pressure currently controlled. Continue Toprol. - EKG 12 Lead    3. RBBB (right bundle branch block with left anterior fascicular block)  Patient with EKG consistent with conduction system disease. Currently asymptomatic. Patient to call if develops symptoms of dizziness, syncope or change in exercise tolerance. 4. Hemarthrosis  No recurrent events on lower dose Eliquis. 5. Abnormal MRI, spine  Concerning findings. We will message her oncologist with results. Return in about 6 months (around 6/1/2023). Raúl Eason MD  12/1/2022  10:59 AM    This note may have been dictated using speech recognition software.   As a result, error of speech recognition may have occurred

## 2022-12-09 NOTE — TELEPHONE ENCOUNTER
-apixaban (ELIQUIS) 2.5 MG TABS tablet  -going to be out soon - does not want to use a local pharmacy because it is more expensive  -[facility]

## 2022-12-15 NOTE — TELEPHONE ENCOUNTER
Patient called on 12/9 requesting a refill for her Eliquis. The medication was filled but it was sent to the wrong pharmacy. Patient needs this refill to be sent to [facility].  Please resend

## 2022-12-16 ENCOUNTER — TELEPHONE (OUTPATIENT)
Dept: ONCOLOGY | Age: 87
End: 2022-12-16

## 2022-12-16 DIAGNOSIS — R93.89 ABNORMAL MRI: ICD-10-CM

## 2022-12-16 DIAGNOSIS — D47.2 IGG MONOCLONAL PROTEIN DISORDER: Primary | ICD-10-CM

## 2022-12-16 DIAGNOSIS — R93.5 ABNORMAL FINDINGS ON DIAGNOSTIC IMAGING OF OTHER ABDOMINAL REGIONS, INCLUDING RETROPERITONEUM: ICD-10-CM

## 2022-12-16 NOTE — TELEPHONE ENCOUNTER
----- Message from Yanely Robledo RN sent at 12/16/2022  9:54 AM EST -----  Can you please reach out to the pt re: moving up her appts? Dr. Wendi Willingham wants her to  have labs and scan done sooner than her currently scheduled appt. May need to call her son. Thanks!     12/30 belgica at 9:15  vein labs week prior   PET scan week prior

## 2022-12-18 ASSESSMENT — ENCOUNTER SYMPTOMS
SHORTNESS OF BREATH: 0
BACK PAIN: 1
WHEEZING: 0
ABDOMINAL PAIN: 0
CHEST TIGHTNESS: 0

## 2022-12-20 DIAGNOSIS — Z85.038 HX OF MALIGNANT NEOPLASM OF COLON: Primary | ICD-10-CM

## 2022-12-23 ENCOUNTER — HOSPITAL ENCOUNTER (OUTPATIENT)
Dept: LAB | Age: 87
Discharge: HOME | End: 2022-12-23
Payer: MEDICARE

## 2022-12-23 DIAGNOSIS — Z85.038 HX OF MALIGNANT NEOPLASM OF COLON: ICD-10-CM

## 2022-12-23 DIAGNOSIS — D47.2 IGG MONOCLONAL PROTEIN DISORDER: ICD-10-CM

## 2022-12-23 LAB
ALBUMIN SERPL-MCNC: 3.6 G/DL (ref 3.2–4.6)
ALBUMIN/GLOB SERPL: 0.9 {RATIO} (ref 0.4–1.6)
ALP SERPL-CCNC: 77 U/L (ref 50–136)
ALT SERPL-CCNC: 18 U/L (ref 12–65)
ANION GAP SERPL CALC-SCNC: 6 MMOL/L (ref 2–11)
AST SERPL-CCNC: 14 U/L (ref 15–37)
BASOPHILS # BLD: 0 K/UL (ref 0–0.2)
BASOPHILS NFR BLD: 1 % (ref 0–2)
BILIRUB SERPL-MCNC: 0.5 MG/DL (ref 0.2–1.1)
BUN SERPL-MCNC: 23 MG/DL (ref 8–23)
CALCIUM SERPL-MCNC: 10.1 MG/DL (ref 8.3–10.4)
CEA SERPL-MCNC: 3.2 NG/ML (ref 0–3)
CHLORIDE SERPL-SCNC: 105 MMOL/L (ref 101–110)
CO2 SERPL-SCNC: 27 MMOL/L (ref 21–32)
CREAT SERPL-MCNC: 0.9 MG/DL (ref 0.6–1)
DIFFERENTIAL METHOD BLD: ABNORMAL
EOSINOPHIL # BLD: 0 K/UL (ref 0–0.8)
EOSINOPHIL NFR BLD: 1 % (ref 0.5–7.8)
ERYTHROCYTE [DISTWIDTH] IN BLOOD BY AUTOMATED COUNT: 14 % (ref 11.9–14.6)
GLOBULIN SER CALC-MCNC: 3.9 G/DL (ref 2.8–4.5)
GLUCOSE SERPL-MCNC: 88 MG/DL (ref 65–100)
HCT VFR BLD AUTO: 40.2 % (ref 35.8–46.3)
HGB BLD-MCNC: 12.9 G/DL (ref 11.7–15.4)
IMM GRANULOCYTES # BLD AUTO: 0 K/UL (ref 0–0.5)
IMM GRANULOCYTES NFR BLD AUTO: 0 % (ref 0–5)
LYMPHOCYTES # BLD: 1.2 K/UL (ref 0.5–4.6)
LYMPHOCYTES NFR BLD: 21 % (ref 13–44)
MCH RBC QN AUTO: 29.7 PG (ref 26.1–32.9)
MCHC RBC AUTO-ENTMCNC: 32.1 G/DL (ref 31.4–35)
MCV RBC AUTO: 92.6 FL (ref 82–102)
MONOCYTES # BLD: 0.5 K/UL (ref 0.1–1.3)
MONOCYTES NFR BLD: 9 % (ref 4–12)
NEUTS SEG # BLD: 3.9 K/UL (ref 1.7–8.2)
NEUTS SEG NFR BLD: 68 % (ref 43–78)
NRBC # BLD: 0 K/UL (ref 0–0.2)
PLATELET # BLD AUTO: 288 K/UL (ref 150–450)
PMV BLD AUTO: 9 FL (ref 9.4–12.3)
POTASSIUM SERPL-SCNC: 3.9 MMOL/L (ref 3.5–5.1)
PROT SERPL-MCNC: 7.5 G/DL (ref 6.3–8.2)
RBC # BLD AUTO: 4.34 M/UL (ref 4.05–5.2)
SODIUM SERPL-SCNC: 138 MMOL/L (ref 133–143)
WBC # BLD AUTO: 5.7 K/UL (ref 4.3–11.1)

## 2022-12-23 PROCEDURE — 83521 IG LIGHT CHAINS FREE EACH: CPT

## 2022-12-23 PROCEDURE — 36415 COLL VENOUS BLD VENIPUNCTURE: CPT

## 2022-12-23 PROCEDURE — 85025 COMPLETE CBC W/AUTO DIFF WBC: CPT

## 2022-12-23 PROCEDURE — 82378 CARCINOEMBRYONIC ANTIGEN: CPT

## 2022-12-23 PROCEDURE — 80053 COMPREHEN METABOLIC PANEL: CPT

## 2022-12-23 PROCEDURE — 86335 IMMUNFIX E-PHORSIS/URINE/CSF: CPT

## 2022-12-27 LAB
ALBUMIN 24H MFR UR ELPH: 17.2 %
ALPHA1 GLOB 24H MFR UR ELPH: 6.4 %
ALPHA2 GLOB 24H MFR UR ELPH: 6.7 %
B-GLOBULIN MFR UR ELPH: 13.6 %
GAMMA GLOB 24H MFR UR ELPH: 56.2 %
INTERPRETATION UR IFE-IMP: ABNORMAL
KAPPA LC FREE SER-MCNC: 606.4 MG/L (ref 3.3–19.4)
KAPPA LC FREE/LAMBDA FREE SER: 54.63 {RATIO} (ref 0.26–1.65)
LAMBDA LC FREE SERPL-MCNC: 11.1 MG/L (ref 5.7–26.3)
Lab: ABNORMAL
M PROTEIN 24H MFR UR ELPH: 39.4 %
PROT UR-MCNC: <4 MG/DL

## 2022-12-29 ENCOUNTER — HOSPITAL ENCOUNTER (OUTPATIENT)
Dept: PET IMAGING | Age: 87
Discharge: HOME | End: 2022-12-29
Payer: MEDICARE

## 2022-12-29 DIAGNOSIS — R93.89 ABNORMAL MRI: ICD-10-CM

## 2022-12-29 DIAGNOSIS — D47.2 IGG MONOCLONAL PROTEIN DISORDER: ICD-10-CM

## 2022-12-29 DIAGNOSIS — R93.5 ABNORMAL FINDINGS ON DIAGNOSTIC IMAGING OF OTHER ABDOMINAL REGIONS, INCLUDING RETROPERITONEUM: ICD-10-CM

## 2022-12-29 LAB
GLUCOSE BLD STRIP.AUTO-MCNC: 95 MG/DL (ref 65–100)
SERVICE CMNT-IMP: NORMAL

## 2022-12-29 PROCEDURE — 3430000000 HC RX DIAGNOSTIC RADIOPHARMACEUTICAL: Performed by: INTERNAL MEDICINE

## 2022-12-29 PROCEDURE — 78815 PET IMAGE W/CT SKULL-THIGH: CPT

## 2022-12-29 PROCEDURE — 2580000003 HC RX 258: Performed by: INTERNAL MEDICINE

## 2022-12-29 PROCEDURE — A9552 F18 FDG: HCPCS | Performed by: INTERNAL MEDICINE

## 2022-12-29 PROCEDURE — 6360000004 HC RX CONTRAST MEDICATION: Performed by: INTERNAL MEDICINE

## 2022-12-29 PROCEDURE — 82962 GLUCOSE BLOOD TEST: CPT

## 2022-12-29 RX ORDER — SODIUM CHLORIDE 0.9 % (FLUSH) 0.9 %
20 SYRINGE (ML) INJECTION AS NEEDED
Status: DISCONTINUED | OUTPATIENT
Start: 2022-12-29 | End: 2023-01-02 | Stop reason: HOSPADM

## 2022-12-29 RX ORDER — FLUDEOXYGLUCOSE F 18 200 MCI/ML
13.64 INJECTION, SOLUTION INTRAVENOUS
Status: COMPLETED | OUTPATIENT
Start: 2022-12-29 | End: 2022-12-29

## 2022-12-29 RX ADMIN — SODIUM CHLORIDE, PRESERVATIVE FREE 20 ML: 5 INJECTION INTRAVENOUS at 12:40

## 2022-12-29 RX ADMIN — DIATRIZOATE MEGLUMINE AND DIATRIZOATE SODIUM 10 ML: 660; 100 LIQUID ORAL; RECTAL at 12:40

## 2022-12-29 RX ADMIN — FLUDEOXYGLUCOSE F 18 13.64 MILLICURIE: 200 INJECTION, SOLUTION INTRAVENOUS at 12:40

## 2022-12-29 ASSESSMENT — ENCOUNTER SYMPTOMS
VOICE CHANGE: 0
SCLERAL ICTERUS: 0
TROUBLE SWALLOWING: 0
HEMOPTYSIS: 0
BLOOD IN STOOL: 0
DIARRHEA: 0
SORE THROAT: 0
NAUSEA: 0
ABDOMINAL DISTENTION: 0
ABDOMINAL PAIN: 0
SHORTNESS OF BREATH: 0
WHEEZING: 0
CONSTIPATION: 0
CHEST TIGHTNESS: 0
VOMITING: 0

## 2022-12-29 NOTE — PROGRESS NOTES
[facility] Hematology and Oncology: Established patient - follow up     Chief Complaint   Patient presents with    Follow-up     Chronological Events:  Reason for Referral: Monoclonal gammopathy   Referring Provider: Alyssa Oconnor MD   Primary Care Provider: Daniela Flaherty MD   Family History of Cancer/Hematologic Disorders: Family history is significant for sister with breast cancer at the age of 80. Presenting Symptoms: No relevant physical symptoms reported. History of Present Illness:  Ms. Yaquelin Herrera is a 80 y.o. female who presents today for follow up regarding MGUS. The past medical history is significant for vitamin D deficiency, PAF on AC, osteopenia, osteoarthritis,  occlusion and stenosis of carotid artery, HLD, mitral valve disorder, malignant neoplasm of rectosigmoid junction, IBS, HTN,  ovarian cancer, colon polyps, Graves disease, GERD, diverticulosis, bilateral carotid artery stenosis, aortic regurgitation, anxiety disorder, total colectomy, tonsillectomy, thyroidectomy, ROSALIA/BSO,  small bowel resection, heart catheterization, cyst I&D, cataract removal, vitrectomy, and orthopedic surgery. She was evaluated in consultation by Rheumatologist, Dr. Mitchell Fuller, on 11/4/21 after being referred by her PCP for evaluation of osteoporosis. Review of systems and physical exam were unremarkable. She was treated with Prolia with her first injection on 11/4/21 with plans for 60 mg Prolia injection every 6 months. Labs drawn the same  day included protein electrophoresis w/reflex PAHM which revealed M-Lm of 0.8 and immunofixation showed IgG monoclonal protein with kappa  light chain specificity. Ms. Yaquelin Herrera was subsequently referred to [facility], per Rheumatology, for hematology evaluation and treatment of  monoclonal gammopathy. Labs reviewed from 10/3 and M-spike is stable, kappa LC and FLC ratio has increased from previous - will make her a sooner f/u in 3 months.   Counts and Cr are stable/adequate. No B symptoms. She does have back pain/joint aches r/t osteoarthritis, sees ortho. She is planning to see pain management soon to see if she can have an injection for her back. Today, Patient is here for follow-up with her son and daughter. Labs reviewed. We had a lengthy discussion regarding the pathophysiology of plasma cell disorders going over the spectrum of plasma cell diseases utilizing visual aids on the white board as well as the Internet. We discussed her MRI results as well as the PET scan. We reviewed the images of the PET scan together. At this time, I recommend continuation of observation re plasma cell d/o. She is completely asymptomatic. No crabs. Heterogeneity seen in the bone marrow may be related to plasma cell disorder. There is no avidity on the PET scan. She does have a left superior to psoas muscle lesion that needs to be investigated further, especially in light of her history of ovarian and colon cancer. CEA 3.1. I will review with radiology to determine best approach (CT versus MRI of abdomen pelvis). All in agreement with this plan. Family and patient appreciative of the discussion today. I recommend that she comes back in about 4 to 6 weeks to determine if there is any changes in her labs. She has been eating/drinking well and her weight is stable. She denies any shortness of breath. She denies any fevers or other infecitous symptoms. Chronological Events:  PROTEIN ELECTROPHORESIS W/REFLEX PHAM 11/4/21            IMMUNOFIXATION 11/4/21          Ref Range & Units  11/4/21 1330         Immunoglobulin G, Qt.  586 - 1,602 mg/dL  1,227          Immunoglobulin A, Qt.  64 - 422 mg/dL  93      Immunoglobulin M, Qt.  26 - 217 mg/dL  32      Immunofixation Result     Comment Abnormal         Comment: Immunofixation shows IgG monoclonal protein with kappa light chain   specificity.             Other Pertinent Information:   03/23/2021 (COVID-19, Pfizer Purple top, DILUTE for use, 12+ yrs, 30mcg/0.3mL dose)   04/13/2021 (COVID-19, Pfizer Purple top, DILUTE for use, 12+ yrs, 30mcg/0.3mL dose)      1/2022 heme/onc consultation    1/27/22 bone survey - neg for lytic lesions    2/9/22 FU - labs reviewed; p/w BmBx    2/24/22 BMBx and aspiration c/w MGUS    3/14/22 FU after BMbx - MGUS; cw surveillance   6/2022 FU - labs stable, c/w surveillance, KL ratio increased and will have her f/u in 3 months instead of 4-5. She is in agreement. 12/30/22 FU - reviewed MR and PET w pt/family; plan for imaging of abd lesion     Family History   Problem Relation Age of Onset    Rheum Arthritis Son     Diabetes Father     Diabetes Other     Hypertension Other     Heart Disease Other     Stroke Sister     Breast Cancer Sister 80    Cancer Sister     Heart Failure Father     Stroke Other     Osteoporosis Mother     Heart Failure Mother     Stroke Mother     Heart Disease Mother     Stroke Father     Thyroid Disease Neg Hx       Social History     Socioeconomic History    Marital status:      Spouse name: None    Number of children: None    Years of education: None    Highest education level: None   Tobacco Use    Smoking status: Never    Smokeless tobacco: Never   Vaping Use    Vaping Use: Never used   Substance and Sexual Activity    Alcohol use: Yes    Drug use: No     Types: Prescription, OTC     Social Determinants of Health     Physical Activity: Insufficiently Active    Days of Exercise per Week: 1 day    Minutes of Exercise per Session: 20 min        Review of Systems   Constitutional:  Negative for appetite change, chills, diaphoresis, fatigue, fever and unexpected weight change. HENT:   Negative for hearing loss, mouth sores, nosebleeds, sore throat, trouble swallowing and voice change. Eyes:  Negative for icterus. Respiratory:  Negative for chest tightness, hemoptysis, shortness of breath and wheezing.     Cardiovascular:  Negative for chest pain, leg swelling and palpitations. Gastrointestinal:  Negative for abdominal distention, abdominal pain, blood in stool, constipation, diarrhea, nausea and vomiting. Endocrine: Negative for hot flashes. Genitourinary:  Negative for difficulty urinating, frequency, vaginal bleeding and vaginal discharge. Musculoskeletal:  Positive for arthralgias. Negative for flank pain, gait problem and myalgias. Skin:  Negative for itching, rash and wound. Neurological:  Negative for dizziness, extremity weakness, gait problem, headaches and numbness. Psychiatric/Behavioral:  Negative for confusion and depression. The patient is nervous/anxious.        No Known Allergies  Past Medical History:   Diagnosis Date    Anxiety disorder     Aortic regurgitation     Asymptomatic bilateral carotid artery stenosis     Diverticulosis of large intestine     GERD (gastroesophageal reflux disease)     Graves disease     History of colon polyps     History of herpes zoster     History of ovarian cancer     Hypertension     Irritable bowel syndrome     Malignant neoplasm of rectosigmoid junction (HCC)     Menopause     Mitral valve disorders(424.0)     Mixed hyperlipidemia     Occlusion and stenosis of carotid artery without mention of cerebral infarction     Osteoarthritis     Osteopenia     Paroxysmal atrial fibrillation (HCC)     Postmenopausal atrophic vaginitis     Primary hypothyroidism     Vaginal atrophy     Vitamin D deficiency      Past Surgical History:   Procedure Laterality Date    ANTERIOR CRUCIATE LIGAMENT REPAIR Left 2014    CARDIAC CATHETERIZATION  12/9/2015    CARPAL TUNNEL RELEASE Left 2009    CATARACT REMOVAL Right 2/2013    intraocular lens implant    COLONOSCOPY  12/2014    dr Mya Corona Left     swollen tissue around knee replacement    SMALL INTESTINE SURGERY  12/2014 and 1/2015    ROSALIA AND BSO (CERVIX REMOVED)  1977    ROSALIA AND BSO (CERVIX REMOVED)  1977    THYROIDECTOMY  08/24/2016 TONSILLECTOMY      Age 32    TOTAL COLECTOMY      TOTAL KNEE ARTHROPLASTY Left 2004    VITRECTOMY Right      Current Outpatient Medications   Medication Sig Dispense Refill    apixaban (ELIQUIS) 2.5 MG TABS tablet Take 1 tablet by mouth 2 times daily Take 1/2 tablet  tablet 3    bimatoprost (LUMIGAN) 0.01 % SOLN ophthalmic drops Place 1 drop into both eyes nightly      SYNTHROID 75 MCG tablet Take 1 tablet by mouth Daily 90 tablet 3    acetaminophen (TYLENOL) 325 MG tablet Take 650 mg by mouth every 6 hours as needed for Pain      indapamide (LOZOL) 1.25 MG tablet Take 1 tablet by mouth in the morning. 90 tablet 3    metoprolol succinate (TOPROL XL) 50 MG extended release tablet Take 1 tablet by mouth in the morning. 90 tablet 3    b complex vitamins capsule Take 1 capsule by mouth in the morning. ibuprofen (ADVIL;MOTRIN) 200 MG tablet Take 100 mg by mouth every 6 hours as needed for Pain      Lifitegrast (XIIDRA) 5 % SOLN Place 1 drop into both eyes in the morning and at bedtime      Brimonidine Tartrate (LUMIFY) 0.025 % SOLN Place 1 drop into both eyes as needed      coenzyme Q10 100 MG CAPS capsule Take 100 mg by mouth daily      conjugated estrogens (PREMARIN) 0.625 MG/GM vaginal cream Place 0.5 g vaginally every 7 days      mometasone (ELOCON) 0.1 % lotion APPLY TO SCALP TWICE DAILY FOR ITCH AS NEEDED      ALPRAZolam (XANAX) 0.25 MG tablet 1 po daily, prn anxiety (Patient not taking: Reported on 12/30/2022)       No current facility-administered medications for this visit. Facility-Administered Medications Ordered in Other Visits   Medication Dose Route Frequency Provider Last Rate Last Admin    sodium chloride flush 0.9 % injection 20 mL  20 mL IntraVENous PRN Sheryl Soto MD   20 mL at 12/29/22 1240    diatrizoate meglumine-sodium (GASTROGRAFIN) 66-10 % solution 10 mL  10 mL Oral ONCE PRN Sheryl Soto MD   10 mL at 12/29/22 1240       No flowsheet data found.     OBJECTIVE:  BP (!) 150/67 Pulse 60   Temp 97.8 °F (36.6 °C) (Oral)   Resp 14   Ht 5' 4.75\" (1.645 m)   Wt 129 lb (58.5 kg)   SpO2 97%   BMI 21.63 kg/m²       ECOG PERFORMANCE STATUS - 1- Restricted in physically strenuous activity but ambulatory and able to carry out work of a light or sedentary nature such as light house work, office work. Pain - /10. None/Minimal pain - not affecting QOL     Fatigue - No flowsheet data found. Distress - No flowsheet data found. Physical Exam  Vitals reviewed. Exam conducted with a chaperone present. Constitutional:       General: She is not in acute distress. Appearance: Normal appearance. She is normal weight. She is not ill-appearing or toxic-appearing. HENT:      Head: Normocephalic and atraumatic. Nose: Nose normal. No congestion. Mouth/Throat:      Mouth: Mucous membranes are moist.   Eyes:      General: No scleral icterus. Extraocular Movements: Extraocular movements intact. Conjunctiva/sclera: Conjunctivae normal.      Pupils: Pupils are equal, round, and reactive to light. Cardiovascular:      Rate and Rhythm: Normal rate and regular rhythm. Heart sounds: No murmur heard. Pulmonary:      Effort: Pulmonary effort is normal. No respiratory distress. Breath sounds: Normal breath sounds. No wheezing or rales. Abdominal:      General: There is no distension. Palpations: Abdomen is soft. There is no mass. Tenderness: There is no abdominal tenderness. There is no guarding or rebound. Musculoskeletal:      Right lower leg: No edema. Left lower leg: No edema. Lymphadenopathy:      Cervical: No cervical adenopathy. Upper Body:      Right upper body: No supraclavicular or axillary adenopathy. Left upper body: No supraclavicular or axillary adenopathy. Skin:     General: Skin is warm and dry. Coloration: Skin is not jaundiced or pale. Findings: No rash. Neurological:      General: No focal deficit present. Mental Status: She is alert and oriented to person, place, and time. Psychiatric:         Behavior: Behavior normal.         Thought Content: Thought content normal.        Labs:  Recent Results (from the past 168 hour(s))   POCT Glucose    Collection Time: 12/29/22 12:39 PM   Result Value Ref Range    POC Glucose 95 65 - 100 mg/dL    Performed by: Ashley Patterson        Imaging: reviewed     Pathology:    2/2022 6/2022        ASSESSMENT:     Diagnosis Orders   1. IgG monoclonal protein disorder  CBC with Auto Differential    Comprehensive Metabolic Panel    PHAM and PE, Serum    Kappa/Lambda Quantitative Free Light Chains, Serum    Random Urine PE and PHAM      2. Osteoporosis without current pathological fracture, unspecified osteoporosis type        3. Abnormal findings on diagnostic imaging of abdomen        4. MGUS (monoclonal gammopathy of unknown significance)            Ms. Nick Lr is here for FU of MGUS. 1. IgG kappa M spike 0.8, Cr <2, Hb >10, Ca normal, skeletal survey neg     - here for follow-up with her son and daughter. Labs reviewed. We had a lengthy discussion regarding the pathophysiology of plasma cell disorders going over the spectrum of plasma cell diseases utilizing visual aids on the white board as well as the Internet. We discussed her MRI results as well as the PET scan. We reviewed the images of the PET scan together. At this time, I recommend continuation of observation re plasma cell d/o. She is completely asymptomatic. No crabs. Heterogeneity seen in the bone marrow may be related to plasma cell disorder. There is no avidity on the PET scan. She does have a left superior to psoas muscle lesion that needs to be investigated further, especially in light of her history of ovarian and colon cancer. CEA 3.1. I will review with radiology to determine best approach (CT versus MRI of abdomen pelvis). All in agreement with this plan.   Family and patient appreciative of the discussion today. I recommend that she comes back in about 4 to 6 weeks to determine if there is any changes in her labs. - BMbx c/w MGUS   - can take B12 supplement - 500-1000 daily   - counts are stable, M-spike stable, KLC down from previous and KL ratio stable     RTC 4-6wks with labs prior or sooner as needed   [40min - chart review, review of results and discussion of options, next steps, coordination of care and charting ]      MDM  Number of Diagnoses or Management Options  Abnormal findings on diagnostic imaging of abdomen: new, needed workup  IgG monoclonal protein disorder: new, needed workup  MGUS (monoclonal gammopathy of unknown significance): established, improving  Osteoporosis without current pathological fracture, unspecified osteoporosis type: established, worsening     Amount and/or Complexity of Data Reviewed  Clinical lab tests: ordered and reviewed  Tests in the radiology section of CPT®: ordered and reviewed  Obtain history from someone other than the patient: yes  Review and summarize past medical records: yes  Discuss the patient with other providers: yes  Independent visualization of images, tracings, or specimens: yes    Risk of Complications, Morbidity, and/or Mortality  Presenting problems: moderate  Diagnostic procedures: moderate  Management options: high        Lab studies and imaging studies were personally reviewed. Pertinent old records were reviewed. Historical:    - At this time, I suspect she may have MGUS. Risk of conversion to multiple myeloma is about 1 %/year. We will complete the work-up including CBC, CMP, SPEP, FLC, UPEP, LDH, beta-2 microglobulin and  CRP at this time. She also is willing to proceed with skeletal survey. - we reviewed the pathophysiology of hematopoiesis as well as plasma cell disorders. Using visual aids I reviewed the dx criteria of multiple myeloma.     All questions were asked and answered to the best of my ability. The patient verbalized understanding and agrees with the plan above.           Shawn Puga MD, MD  [facility] Hematology and Oncology  [address] [place] [address]  Office : [phone]  Fax : [phone]

## 2022-12-30 ENCOUNTER — OFFICE VISIT (OUTPATIENT)
Dept: ONCOLOGY | Age: 87
End: 2022-12-30
Payer: MEDICARE

## 2022-12-30 VITALS
RESPIRATION RATE: 14 BRPM | HEIGHT: 65 IN | HEART RATE: 60 BPM | TEMPERATURE: 97.8 F | WEIGHT: 129 LBS | DIASTOLIC BLOOD PRESSURE: 67 MMHG | OXYGEN SATURATION: 97 % | BODY MASS INDEX: 21.49 KG/M2 | SYSTOLIC BLOOD PRESSURE: 150 MMHG

## 2022-12-30 DIAGNOSIS — D47.2 MGUS (MONOCLONAL GAMMOPATHY OF UNKNOWN SIGNIFICANCE): Primary | ICD-10-CM

## 2022-12-30 DIAGNOSIS — D47.2 IGG MONOCLONAL PROTEIN DISORDER: ICD-10-CM

## 2022-12-30 DIAGNOSIS — R93.5 ABNORMAL FINDINGS ON DIAGNOSTIC IMAGING OF ABDOMEN: ICD-10-CM

## 2022-12-30 DIAGNOSIS — M81.0 OSTEOPOROSIS WITHOUT CURRENT PATHOLOGICAL FRACTURE, UNSPECIFIED OSTEOPOROSIS TYPE: Chronic | ICD-10-CM

## 2022-12-30 LAB
ALBUMIN SERPL ELPH-MCNC: 3.6 G/DL (ref 2.9–4.4)
ALBUMIN/GLOB SERPL: 1.1 {RATIO} (ref 0.7–1.7)
ALPHA1 GLOB SERPL ELPH-MCNC: 0.2 G/DL (ref 0–0.4)
ALPHA2 GLOB SERPL ELPH-MCNC: 0.7 G/DL (ref 0.4–1)
B-GLOBULIN SERPL ELPH-MCNC: 1 G/DL (ref 0.7–1.3)
GAMMA GLOB SERPL ELPH-MCNC: 1.3 G/DL (ref 0.4–1.8)
GLOBULIN SER-MCNC: 3.3 G/DL (ref 2.2–3.9)
IGA SERPL-MCNC: 78 MG/DL (ref 64–422)
IGG SERPL-MCNC: 1513 MG/DL (ref 586–1602)
IGM SERPL-MCNC: 34 MG/DL (ref 26–217)
INTERPRETATION SERPL IEP-IMP: ABNORMAL
M PROTEIN SERPL ELPH-MCNC: 0.8 G/DL
PROT SERPL-MCNC: 6.9 G/DL (ref 6–8.5)

## 2022-12-30 PROCEDURE — 1036F TOBACCO NON-USER: CPT | Performed by: INTERNAL MEDICINE

## 2022-12-30 PROCEDURE — 99215 OFFICE O/P EST HI 40 MIN: CPT | Performed by: INTERNAL MEDICINE

## 2022-12-30 PROCEDURE — G8484 FLU IMMUNIZE NO ADMIN: HCPCS | Performed by: INTERNAL MEDICINE

## 2022-12-30 PROCEDURE — G8420 CALC BMI NORM PARAMETERS: HCPCS | Performed by: INTERNAL MEDICINE

## 2022-12-30 PROCEDURE — 1123F ACP DISCUSS/DSCN MKR DOCD: CPT | Performed by: INTERNAL MEDICINE

## 2022-12-30 PROCEDURE — 1090F PRES/ABSN URINE INCON ASSESS: CPT | Performed by: INTERNAL MEDICINE

## 2022-12-30 PROCEDURE — G8427 DOCREV CUR MEDS BY ELIG CLIN: HCPCS | Performed by: INTERNAL MEDICINE

## 2022-12-30 ASSESSMENT — PATIENT HEALTH QUESTIONNAIRE - PHQ9
SUM OF ALL RESPONSES TO PHQ QUESTIONS 1-9: 0
2. FEELING DOWN, DEPRESSED OR HOPELESS: 0
SUM OF ALL RESPONSES TO PHQ QUESTIONS 1-9: 0
1. LITTLE INTEREST OR PLEASURE IN DOING THINGS: 0
SUM OF ALL RESPONSES TO PHQ9 QUESTIONS 1 & 2: 0

## 2022-12-30 NOTE — PATIENT INSTRUCTIONS
Patient Instructions from Today's Visit    Reason for Visit:  Follow up. Diagnosis Information:  [email or web address]/. net/about-us/asco-answers-patient-education-materials/bshc-vutfeyk-eykn-sheets      Plan:  Reviewed available results. May do a scan soon. Follow Up:  Several week, labs week prior. Recent Lab Results:  Hospital Outpatient Visit on 12/29/2022   Component Date Value Ref Range Status    POC Glucose 12/29/2022 95  65 - 100 mg/dL Final    Comment: 47 - 60 mg/dl Consistent with, but not fully diagnostic of hypoglycemia.   101 - 125 mg/dl Impaired fasting glucose/consistent with pre-diabetes mellitus  > 126 mg/dl Fasting glucose consistent with overt diabetes mellitus      Performed by: 12/29/2022 [facility] Outpatient Visit on 12/23/2022   Component Date Value Ref Range Status    WBC 12/23/2022 5.7  4.3 - 11.1 K/uL Final    RBC 12/23/2022 4.34  4.05 - 5.2 M/uL Final    Hemoglobin 12/23/2022 12.9  11.7 - 15.4 g/dL Final    Hematocrit 12/23/2022 40.2  35.8 - 46.3 % Final    MCV 12/23/2022 92.6  82.0 - 102.0 FL Final    MCH 12/23/2022 29.7  26.1 - 32.9 PG Final    MCHC 12/23/2022 32.1  31.4 - 35.0 g/dL Final    RDW 12/23/2022 14.0  11.9 - 14.6 % Final    Platelets 50/16/9648 288  150 - 450 K/uL Final    MPV 12/23/2022 9.0 (A)  9.4 - 12.3 FL Final    nRBC 12/23/2022 0.00  0.0 - 0.2 K/uL Final    **Note: Absolute NRBC parameter is now reported with Hemogram**    Seg Neutrophils 12/23/2022 68  43 - 78 % Final    Lymphocytes 12/23/2022 21  13 - 44 % Final    Monocytes 12/23/2022 9  4.0 - 12.0 % Final    Eosinophils % 12/23/2022 1  0.5 - 7.8 % Final    Basophils 12/23/2022 1  0.0 - 2.0 % Final    Immature Granulocytes 12/23/2022 0  0.0 - 5.0 % Final    Segs Absolute 12/23/2022 3.9  1.7 - 8.2 K/UL Final    Absolute Lymph # 12/23/2022 1.2  0.5 - 4.6 K/UL Final    Absolute Mono # 12/23/2022 0.5  0.1 - 1.3 K/UL Final    Absolute Eos # 12/23/2022 0.0  0.0 - 0.8 K/UL Final    Basophils Absolute 12/23/2022 0.0  0.0 - 0.2 K/UL Final    Absolute Immature Granulocyte 12/23/2022 0.0  0.0 - 0.5 K/UL Final    Differential Type 12/23/2022 AUTOMATED    Final    Sodium 12/23/2022 138  133 - 143 mmol/L Final    Potassium 12/23/2022 3.9  3.5 - 5.1 mmol/L Final    Chloride 12/23/2022 105  101 - 110 mmol/L Final    CO2 12/23/2022 27  21 - 32 mmol/L Final    Anion Gap 12/23/2022 6  2 - 11 mmol/L Final    Glucose 12/23/2022 88  65 - 100 mg/dL Final    BUN 12/23/2022 23  8 - 23 MG/DL Final    Creatinine 12/23/2022 0.90  0.6 - 1.0 MG/DL Final    Est, Glom Filt Rate 12/23/2022 >60  >60 ml/min/1.73m2 Final    Comment:      Pediatric calculator link: [place].at. org/professionals/kdoqi/gfr_calculatorped       These results are not intended for use in patients <25years of age. eGFR results are calculated without a race factor using  the 2021 CKD-EPI equation. Careful clinical correlation is recommended, particularly when comparing to results calculated using previous equations. The CKD-EPI equation is less accurate in patients with extremes of muscle mass, extra-renal metabolism of creatinine, excessive creatine ingestion, or following therapy that affects renal tubular secretion.       Calcium 12/23/2022 10.1  8.3 - 10.4 MG/DL Final    Total Bilirubin 12/23/2022 0.5  0.2 - 1.1 MG/DL Final    ALT 12/23/2022 18  12 - 65 U/L Final    AST 12/23/2022 14 (A)  15 - 37 U/L Final    Alk Phosphatase 12/23/2022 77  50 - 136 U/L Final    Total Protein 12/23/2022 7.5  6.3 - 8.2 g/dL Final    Albumin 12/23/2022 3.6  3.2 - 4.6 g/dL Final    Globulin 12/23/2022 3.9  2.8 - 4.5 g/dL Final    Albumin/Globulin Ratio 12/23/2022 0.9  0.4 - 1.6   Final    Free Kappa Light Chains 12/23/2022 606.4 (A)  3.3 - 19.4 mg/L Final    Free Lambda Light Chains 12/23/2022 11.1  5.7 - 26.3 mg/L Final    K/L RATIO 12/23/2022 54.63 (A)  0.26 - 1.65   Final    Comment: (NOTE)  Performed At: [address], [place] [id number]  Rosa Elena Martin MD AH:[id number]      Protein, Total Urine 12/23/2022 <4.0  Not Estab. mg/dL Final    Albumin, 24 Hr Urine 12/23/2022 17.2  % Final    Alpha-1-Globulin, U 12/23/2022 6.4  % Final    Alpha-2-Globulin, U 12/23/2022 6.7  % Final    Beta Globulin, U 12/23/2022 13.6  % Final    Gamma Globulin, U 12/23/2022 56.2  % Final    M-Lm, % 12/23/2022 39.4 (A)  Not Observed % Final    Immunofixation Result, Urine 12/23/2022 Comment (A)    Final    Comment: (NOTE)  Bence Fermin Protein positive; kappa type. Immunofixation shows IgG monoclonal protein with kappa light chain  specificity. Note: 12/23/2022 Comment    Final    Comment: (NOTE)  Protein electrophoresis scan will follow via computer, mail, or   delivery. Performed At: [address] [id number]  Rosa Elena Martin MD FU:[id number]      CEA 12/23/2022 3.2 (A)  0.0 - 3.0 ng/mL Final    Comment: Nonsmoker:  <3.0 ng/mL  Smoker:     <5.0 ng/mL  [employer]. Patient's results of tumor marker testing may not be comparable to labs using different manufacturers/methods. Treatment Summary has been discussed and given to patient: n/a        -------------------------------------------------------------------------------------------------------------------  Please call our office at [phone] if you have any  of the following symptoms:   Fever of 100.5 or greater  Chills  Shortness of breath  Swelling or pain in one leg    After office hours an answering service is available and will contact a provider for emergencies or if you are experiencing any of the above symptoms. Patient did express an interest in My Chart. My Chart log in information explained on the after visit summary printout at the [facility] [address] desk.     Jose Guadalupe Pickens RN

## 2022-12-31 PROBLEM — R93.5 ABNORMAL FINDINGS ON DIAGNOSTIC IMAGING OF ABDOMEN: Status: ACTIVE | Noted: 2022-12-31

## 2022-12-31 PROBLEM — D47.2 MGUS (MONOCLONAL GAMMOPATHY OF UNKNOWN SIGNIFICANCE): Status: ACTIVE | Noted: 2022-12-31

## 2023-01-03 ENCOUNTER — TELEPHONE (OUTPATIENT)
Dept: INTERNAL MEDICINE CLINIC | Facility: CLINIC | Age: 88
End: 2023-01-03

## 2023-01-03 DIAGNOSIS — M62.89 PSOAS MASS: ICD-10-CM

## 2023-01-03 DIAGNOSIS — Z85.43 HX OF OVARIAN CANCER: ICD-10-CM

## 2023-01-03 DIAGNOSIS — Z85.038 HISTORY OF MALIGNANT NEOPLASM OF COLON: ICD-10-CM

## 2023-01-03 DIAGNOSIS — R93.5 ABNORMAL FINDINGS ON DIAGNOSTIC IMAGING OF ABDOMEN: Primary | ICD-10-CM

## 2023-01-03 NOTE — TELEPHONE ENCOUNTER
Called [facility] and spoke to Cami Reynoso to clarify sig for Eliquis 2.5 mg per Karol Cota patient is to take 1 tab BID

## 2023-01-16 ENCOUNTER — HOSPITAL ENCOUNTER (OUTPATIENT)
Dept: MRI IMAGING | Age: 88
Discharge: HOME | End: 2023-01-19

## 2023-01-16 DIAGNOSIS — M62.89 PSOAS MASS: ICD-10-CM

## 2023-01-16 DIAGNOSIS — Z85.43 HX OF OVARIAN CANCER: ICD-10-CM

## 2023-01-16 DIAGNOSIS — R93.5 ABNORMAL FINDINGS ON DIAGNOSTIC IMAGING OF ABDOMEN: ICD-10-CM

## 2023-01-16 DIAGNOSIS — Z85.038 HISTORY OF MALIGNANT NEOPLASM OF COLON: ICD-10-CM

## 2023-02-02 DIAGNOSIS — E03.9 PRIMARY HYPOTHYROIDISM: ICD-10-CM

## 2023-02-02 LAB — TSH, 3RD GENERATION: 4.22 UIU/ML (ref 0.36–3.74)

## 2023-02-06 ENCOUNTER — HOSPITAL ENCOUNTER (OUTPATIENT)
Dept: LAB | Age: 88
Discharge: HOME | End: 2023-02-09
Payer: MEDICARE

## 2023-02-06 DIAGNOSIS — D47.2 IGG MONOCLONAL PROTEIN DISORDER: ICD-10-CM

## 2023-02-06 LAB
ALBUMIN SERPL-MCNC: 3.4 G/DL (ref 3.2–4.6)
ALBUMIN/GLOB SERPL: 0.8 (ref 0.4–1.6)
ALP SERPL-CCNC: 88 U/L (ref 50–136)
ALT SERPL-CCNC: 19 U/L (ref 12–65)
ANION GAP SERPL CALC-SCNC: 4 MMOL/L (ref 2–11)
AST SERPL-CCNC: 15 U/L (ref 15–37)
BASOPHILS # BLD: 0 K/UL (ref 0–0.2)
BASOPHILS NFR BLD: 0 % (ref 0–2)
BILIRUB SERPL-MCNC: 0.4 MG/DL (ref 0.2–1.1)
BUN SERPL-MCNC: 19 MG/DL (ref 8–23)
CALCIUM SERPL-MCNC: 11 MG/DL (ref 8.3–10.4)
CHLORIDE SERPL-SCNC: 103 MMOL/L (ref 101–110)
CO2 SERPL-SCNC: 31 MMOL/L (ref 21–32)
CREAT SERPL-MCNC: 0.9 MG/DL (ref 0.6–1)
DIFFERENTIAL METHOD BLD: ABNORMAL
EOSINOPHIL # BLD: 0 K/UL (ref 0–0.8)
EOSINOPHIL NFR BLD: 1 % (ref 0.5–7.8)
ERYTHROCYTE [DISTWIDTH] IN BLOOD BY AUTOMATED COUNT: 14.4 % (ref 11.9–14.6)
GLOBULIN SER CALC-MCNC: 4.1 G/DL (ref 2.8–4.5)
GLUCOSE SERPL-MCNC: 85 MG/DL (ref 65–100)
HCT VFR BLD AUTO: 41.5 % (ref 35.8–46.3)
HGB BLD-MCNC: 13.5 G/DL (ref 11.7–15.4)
IMM GRANULOCYTES # BLD AUTO: 0 K/UL (ref 0–0.5)
IMM GRANULOCYTES NFR BLD AUTO: 0 % (ref 0–5)
LYMPHOCYTES # BLD: 1.1 K/UL (ref 0.5–4.6)
LYMPHOCYTES NFR BLD: 23 % (ref 13–44)
MCH RBC QN AUTO: 29.9 PG (ref 26.1–32.9)
MCHC RBC AUTO-ENTMCNC: 32.5 G/DL (ref 31.4–35)
MCV RBC AUTO: 92 FL (ref 82–102)
MONOCYTES # BLD: 0.5 K/UL (ref 0.1–1.3)
MONOCYTES NFR BLD: 10 % (ref 4–12)
NEUTS SEG # BLD: 3.3 K/UL (ref 1.7–8.2)
NEUTS SEG NFR BLD: 66 % (ref 43–78)
NRBC # BLD: 0 K/UL (ref 0–0.2)
PLATELET # BLD AUTO: 256 K/UL (ref 150–450)
PMV BLD AUTO: 9 FL (ref 9.4–12.3)
POTASSIUM SERPL-SCNC: 3.9 MMOL/L (ref 3.5–5.1)
PROT SERPL-MCNC: 7.5 G/DL (ref 6.3–8.2)
RBC # BLD AUTO: 4.51 M/UL (ref 4.05–5.2)
SODIUM SERPL-SCNC: 138 MMOL/L (ref 133–143)
WBC # BLD AUTO: 5 K/UL (ref 4.3–11.1)

## 2023-02-06 PROCEDURE — 84156 ASSAY OF PROTEIN URINE: CPT

## 2023-02-06 PROCEDURE — 83521 IG LIGHT CHAINS FREE EACH: CPT

## 2023-02-06 PROCEDURE — 80053 COMPREHEN METABOLIC PANEL: CPT

## 2023-02-06 PROCEDURE — 36415 COLL VENOUS BLD VENIPUNCTURE: CPT

## 2023-02-06 PROCEDURE — 85025 COMPLETE CBC W/AUTO DIFF WBC: CPT

## 2023-02-07 LAB
KAPPA LC FREE SER-MCNC: 771.4 MG/L (ref 3.3–19.4)
KAPPA LC FREE/LAMBDA FREE SER: 52.12 (ref 0.26–1.65)
LAMBDA LC FREE SERPL-MCNC: 14.8 MG/L (ref 5.7–26.3)

## 2023-02-09 LAB
ALBUMIN 24H MFR UR ELPH: 26.6 %
ALBUMIN SERPL ELPH-MCNC: 3.4 G/DL (ref 2.9–4.4)
ALBUMIN/GLOB SERPL: 1.1 (ref 0.7–1.7)
ALPHA1 GLOB 24H MFR UR ELPH: 9.7 %
ALPHA1 GLOB SERPL ELPH-MCNC: 0.3 G/DL (ref 0–0.4)
ALPHA2 GLOB 24H MFR UR ELPH: 10.9 %
ALPHA2 GLOB SERPL ELPH-MCNC: 0.8 G/DL (ref 0.4–1)
B-GLOBULIN MFR UR ELPH: 13.3 %
B-GLOBULIN SERPL ELPH-MCNC: 1.1 G/DL (ref 0.7–1.3)
GAMMA GLOB 24H MFR UR ELPH: 39.5 %
GAMMA GLOB SERPL ELPH-MCNC: 1.2 G/DL (ref 0.4–1.8)
GLOBULIN SER-MCNC: 3.3 G/DL (ref 2.2–3.9)
IGA SERPL-MCNC: 76 MG/DL (ref 64–422)
IGG SERPL-MCNC: 1444 MG/DL (ref 586–1602)
IGM SERPL-MCNC: 28 MG/DL (ref 26–217)
INTERPRETATION SERPL IEP-IMP: ABNORMAL
INTERPRETATION UR IFE-IMP: ABNORMAL
Lab: ABNORMAL
M PROTEIN 24H MFR UR ELPH: 31.9 %
M PROTEIN SERPL ELPH-MCNC: 0.8 G/DL
PROT SERPL-MCNC: 6.7 G/DL (ref 6–8.5)
PROT UR-MCNC: 9.2 MG/DL

## 2023-02-10 ASSESSMENT — ENCOUNTER SYMPTOMS
ABDOMINAL PAIN: 0
VOMITING: 0
VOICE CHANGE: 0
WHEEZING: 0
ABDOMINAL DISTENTION: 0
BLOOD IN STOOL: 0
DIARRHEA: 0
SORE THROAT: 0
HEMOPTYSIS: 0
SHORTNESS OF BREATH: 0
CONSTIPATION: 0
SCLERAL ICTERUS: 0
TROUBLE SWALLOWING: 0
CHEST TIGHTNESS: 0
NAUSEA: 0

## 2023-02-10 NOTE — PROGRESS NOTES
[facility] Hematology and Oncology: Established patient - follow up     Chief Complaint   Patient presents with    Follow-up     Chronological Events:  Reason for Referral: Monoclonal gammopathy   Referring Provider: Rod Andres MD   Primary Care Provider: Javier Chawla MD   Family History of Cancer/Hematologic Disorders: Family history is significant for sister with breast cancer at the age of 80. Presenting Symptoms: No relevant physical symptoms reported. History of Present Illness:  Ms. Lucy Peters is a 80 y.o. female who presents today for follow up regarding MGUS. The past medical history is significant for vitamin D deficiency, PAF on AC, osteopenia, osteoarthritis,  occlusion and stenosis of carotid artery, HLD, mitral valve disorder, malignant neoplasm of rectosigmoid junction, IBS, HTN,  ovarian cancer, colon polyps, Graves disease, GERD, diverticulosis, bilateral carotid artery stenosis, aortic regurgitation, anxiety disorder, total colectomy, tonsillectomy, thyroidectomy, ROSALIA/BSO,  small bowel resection, heart catheterization, cyst I&D, cataract removal, vitrectomy, and orthopedic surgery. She was evaluated in consultation by Rheumatologist, Dr. Almita Eason, on 11/4/21 after being referred by her PCP for evaluation of osteoporosis. Review of systems and physical exam were unremarkable. She was treated with Prolia with her first injection on 11/4/21 with plans for 60 mg Prolia injection every 6 months. 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 Ms. Lucy Peters was subsequently referred to [facility], per Rheumatology, for hematology evaluation and treatment of  monoclonal gammopathy.      We had a lengthy discussion regarding the pathophysiology of plasma cell disorders going over the spectrum of plasma cell diseases utilizing visual aids on the white board as well as the Internet. We discussed her MRI results as well as the PET scan. We reviewed the images of the PET scan together. At this time, I recommend continuation of observation re plasma cell d/o. She is asymptomatic. No crabs. Heterogeneity seen in the bone marrow may be related to plasma cell disorder. There is no avidity on the PET scan. She does have a left superior to psoas muscle lesion that needs to be investigated further, especially in light of her history of ovarian and colon cancer. CEA 3.1. I will review with radiology to determine best approach (CT versus MRI of abdomen pelvis). Today, Patient is here for follow-up with her son and daughter. Labs reviewed. I have reviewed her MR with IR (Dr Terence James) and sx (Dr Rosalinda Duran). No intervention planned at this time. The lesion next to psoas muscle is not accessible by bx. Since it has not changed since 11/2022, rec to monitor with imaging. It is new since 2013. We discussed DDx. Per rad, may be nn sheath tumor. She agrees with this plan to repeat imaging before next apt. Labs reviewed and stable. She has been eating/drinking well and her weight is stable. She denies any shortness of breath. She denies any fevers or other infecitous symptoms. She has joint aches when it rains and arthritic changes. Has a mobile growth over one of the hand digit joints - has been there for ~3years. Does not bother her. Chronological Events:  PROTEIN ELECTROPHORESIS W/REFLEX PHAM 11/4/21            IMMUNOFIXATION 11/4/21          Ref Range & Units  11/4/21 1330         Immunoglobulin G, Qt.  586 - 1,602 mg/dL  1,227          Immunoglobulin A, Qt.  64 - 422 mg/dL  93      Immunoglobulin M, Qt.  26 - 217 mg/dL  32      Immunofixation Result     Comment Abnormal         Comment: Immunofixation shows IgG monoclonal protein with kappa light chain   specificity.             Other Pertinent Information:   03/23/2021 (COVID-19, InDMusic top, DILUTE for use, 12+ yrs, 30mcg/0.3mL dose)   04/13/2021 (COVID-19, Pfizer Purple top, DILUTE for use, 12+ yrs, 30mcg/0.3mL dose)      1/2022 heme/onc consultation    1/27/22 bone survey - neg for lytic lesions    2/9/22 FU - labs reviewed; p/w BmBx    2/24/22 BMBx and aspiration c/w MGUS    3/14/22 FU after BMbx - MGUS; cw surveillance   6/2022 FU - labs stable, c/w surveillance, KL ratio increased and will have her f/u in 3 months instead of 4-5. She is in agreement. 12/30/22 FU - reviewed MR and PET w pt/family; plan for imaging of abd lesion   2/13/23 FU after MR - reviewed results and recs; plan fu in 2.5mo with labs and imaging. Family History   Problem Relation Age of Onset    Rheum Arthritis Son     Diabetes Father     Diabetes Other     Hypertension Other     Heart Disease Other     Stroke Sister     Breast Cancer Sister 80    Cancer Sister     Heart Failure Father     Stroke Other     Osteoporosis Mother     Heart Failure Mother     Stroke Mother     Heart Disease Mother     Stroke Father     Thyroid Disease Neg Hx       Social History     Socioeconomic History    Marital status:      Spouse name: None    Number of children: None    Years of education: None    Highest education level: None   Tobacco Use    Smoking status: Never    Smokeless tobacco: Never   Vaping Use    Vaping Use: Never used   Substance and Sexual Activity    Alcohol use: Yes    Drug use: No     Types: Prescription, OTC     Social Determinants of Health     Physical Activity: Insufficiently Active    Days of Exercise per Week: 1 day    Minutes of Exercise per Session: 20 min        Review of Systems   Constitutional:  Negative for appetite change, chills, diaphoresis, fatigue, fever and unexpected weight change. HENT:   Negative for hearing loss, mouth sores, nosebleeds, sore throat, trouble swallowing and voice change. Eyes:  Negative for icterus.    Respiratory:  Negative for chest tightness, hemoptysis, shortness of breath and wheezing. Cardiovascular:  Negative for chest pain, leg swelling and palpitations. Gastrointestinal:  Negative for abdominal distention, abdominal pain, blood in stool, constipation, diarrhea, nausea and vomiting. Endocrine: Negative for hot flashes. Genitourinary:  Negative for difficulty urinating, frequency, vaginal bleeding and vaginal discharge. Musculoskeletal:  Positive for arthralgias. Negative for flank pain, gait problem and myalgias. Skin:  Negative for itching, rash and wound. Neurological:  Negative for dizziness, extremity weakness, gait problem, headaches and numbness. Psychiatric/Behavioral:  Negative for confusion and depression. The patient is nervous/anxious.        No Known Allergies  Past Medical History:   Diagnosis Date    Anxiety disorder     Aortic regurgitation     Asymptomatic bilateral carotid artery stenosis     Diverticulosis of large intestine     GERD (gastroesophageal reflux disease)     Graves disease     History of colon polyps     History of herpes zoster     History of ovarian cancer     Hypertension     Irritable bowel syndrome     Malignant neoplasm of rectosigmoid junction (HCC)     Menopause     Mitral valve disorders(424.0)     Mixed hyperlipidemia     Occlusion and stenosis of carotid artery without mention of cerebral infarction     Osteoarthritis     Osteopenia     Paroxysmal atrial fibrillation (HCC)     Postmenopausal atrophic vaginitis     Primary hypothyroidism     Vaginal atrophy     Vitamin D deficiency      Past Surgical History:   Procedure Laterality Date    ANTERIOR CRUCIATE LIGAMENT REPAIR Left 2014    CARDIAC CATHETERIZATION  12/9/2015    CARPAL TUNNEL RELEASE Left 2009    CATARACT REMOVAL Right 2/2013    intraocular lens implant    COLONOSCOPY  12/2014    dr Rianna Acosta Left     swollen tissue around knee replacement    SMALL INTESTINE SURGERY  12/2014 and 1/2015    ROSALIA AND BSO (CERVIX REMOVED)  1977    ROSALIA AND BSO (CERVIX REMOVED)  1977    THYROIDECTOMY  08/24/2016    TONSILLECTOMY      Age 32    TOTAL COLECTOMY      TOTAL KNEE ARTHROPLASTY Left 2004    VITRECTOMY Right      Current Outpatient Medications   Medication Sig Dispense Refill    apixaban (ELIQUIS) 2.5 MG TABS tablet Take 1 tablet by mouth 2 times daily Take 1/2 tablet  tablet 3    bimatoprost (LUMIGAN) 0.01 % SOLN ophthalmic drops Place 1 drop into both eyes nightly      SYNTHROID 75 MCG tablet Take 1 tablet by mouth Daily 90 tablet 3    acetaminophen (TYLENOL) 325 MG tablet Take 650 mg by mouth every 6 hours as needed for Pain      indapamide (LOZOL) 1.25 MG tablet Take 1 tablet by mouth in the morning. 90 tablet 3    metoprolol succinate (TOPROL XL) 50 MG extended release tablet Take 1 tablet by mouth in the morning. 90 tablet 3    b complex vitamins capsule Take 1 capsule by mouth in the morning. ibuprofen (ADVIL;MOTRIN) 200 MG tablet Take 100 mg by mouth every 6 hours as needed for Pain      Lifitegrast (XIIDRA) 5 % SOLN Place 1 drop into both eyes in the morning and at bedtime      Brimonidine Tartrate (LUMIFY) 0.025 % SOLN Place 1 drop into both eyes as needed      coenzyme Q10 100 MG CAPS capsule Take 100 mg by mouth daily      conjugated estrogens (PREMARIN) 0.625 MG/GM vaginal cream Place 0.5 g vaginally every 7 days      mometasone (ELOCON) 0.1 % lotion APPLY TO SCALP TWICE DAILY FOR ITCH AS NEEDED      ALPRAZolam (XANAX) 0.25 MG tablet 1 po daily, prn anxiety (Patient not taking: No sig reported)       No current facility-administered medications for this visit. No flowsheet data found.     OBJECTIVE:  BP (!) 145/79   Pulse 68   Temp 98.4 °F (36.9 °C) (Oral)   Resp 16   Ht 5' 4.75\" (1.645 m)   Wt 132 lb 11.2 oz (60.2 kg)   SpO2 96%   BMI 22.25 kg/m²       ECOG PERFORMANCE STATUS - 1- Restricted in physically strenuous activity but ambulatory and able to carry out work of a light or sedentary nature such as light house work, office work. Pain - /10. None/Minimal pain - not affecting QOL     Fatigue - No flowsheet data found. Distress - No flowsheet data found. Physical Exam  Vitals reviewed. Exam conducted with a chaperone present. Constitutional:       General: She is not in acute distress. Appearance: Normal appearance. She is normal weight. She is not ill-appearing or toxic-appearing. HENT:      Head: Normocephalic and atraumatic. Nose: Nose normal. No congestion. Mouth/Throat:      Mouth: Mucous membranes are moist.   Eyes:      General: No scleral icterus. Extraocular Movements: Extraocular movements intact. Conjunctiva/sclera: Conjunctivae normal.      Pupils: Pupils are equal, round, and reactive to light. Cardiovascular:      Rate and Rhythm: Normal rate and regular rhythm. Heart sounds: No murmur heard. Pulmonary:      Effort: Pulmonary effort is normal. No respiratory distress. Breath sounds: Normal breath sounds. No wheezing or rales. Abdominal:      General: There is no distension. Palpations: Abdomen is soft. Tenderness: There is no abdominal tenderness. Musculoskeletal:      Right lower leg: No edema. Left lower leg: No edema. Comments: Growth over one of the hand digit joints - freely mobile and hard, no DROM    Lymphadenopathy:      Cervical: No cervical adenopathy. Upper Body:      Right upper body: No supraclavicular or axillary adenopathy. Left upper body: No supraclavicular or axillary adenopathy. Skin:     General: Skin is warm and dry. Coloration: Skin is not jaundiced or pale. Findings: No rash. Neurological:      General: No focal deficit present. Mental Status: She is alert and oriented to person, place, and time. Psychiatric:         Behavior: Behavior normal.         Thought Content:  Thought content normal.        Labs:  No results found for this or any previous visit (from the past 168 hour(s)). Imaging: reviewed     Pathology:    2/2022 6/2022        ASSESSMENT:     Diagnosis Orders   1. MGUS (monoclonal gammopathy of unknown significance)          Ms. Mandy Dalton is here for FU of MGUS. 1. IgG kappa M spike 0.8, Cr <2, Hb >10, Ca normal, skeletal survey neg   - BMbx c/w MGUS     - here for follow-up with her son and daughter. I have reviewed her MR with IR (Dr Barry Ware) and sx (Dr Moraima Al). No invasive intervention planned at this time. The lesion next to psoas muscle is not accessible by bx. Since it has not changed since 11/2022, rec to monitor with imaging. ?related to osteophyte at this level. It is new since 2013 (last prior imaging). We discussed DDx. Per rad, may be nn sheath tumor. She agrees with this plan to repeat imaging before next apt.    - Labs reviewed and stable. M spike stable   - She has been eating/drinking well and her weight is stable. - She has joint aches when it rains and arthritic changes. Has a mobile growth over one of the hand digit joints - has been there for ~3years. Does not bother her.    - can take B12 supplement - 500-1000 daily      RTC 2.5mo for labs and 3mo fu or sooner as needed     MDM  Number of Diagnoses or Management Options  MGUS (monoclonal gammopathy of unknown significance): established, improving     Amount and/or Complexity of Data Reviewed  Clinical lab tests: ordered and reviewed  Tests in the radiology section of CPT®: ordered and reviewed  Obtain history from someone other than the patient: yes  Review and summarize past medical records: yes  Discuss the patient with other providers: yes  Independent visualization of images, tracings, or specimens: yes    Risk of Complications, Morbidity, and/or Mortality  Presenting problems: moderate  Diagnostic procedures: moderate  Management options: moderate      Lab studies and imaging studies were personally reviewed. Pertinent old records were reviewed. Historical:    - At this time, I suspect she may have MGUS. Risk of conversion to multiple myeloma is about 1 %/year. We will complete the work-up including CBC, CMP, SPEP, FLC, UPEP, LDH, beta-2 microglobulin and  CRP at this time. She also is willing to proceed with skeletal survey. - we reviewed the pathophysiology of hematopoiesis as well as plasma cell disorders. Using visual aids I reviewed the dx criteria of multiple myeloma. - here for follow-up with her son and daughter. Labs reviewed. We had a lengthy discussion regarding the pathophysiology of plasma cell disorders going over the spectrum of plasma cell diseases utilizing visual aids on the white board as well as the Internet. We discussed her MRI results as well as the PET scan. We reviewed the images of the PET scan together. At this time, I recommend continuation of observation re plasma cell d/o. She is completely asymptomatic. No crabs. Heterogeneity seen in the bone marrow may be related to plasma cell disorder. There is no avidity on the PET scan. She does have a left superior to psoas muscle lesion that needs to be investigated further, especially in light of her history of ovarian and colon cancer. CEA 3.1. I will review with radiology to determine best approach (CT versus MRI of abdomen pelvis). All in agreement with this plan. Family and patient appreciative of the discussion today. I recommend that she comes back in about 4 to 6 weeks to determine if there is any changes in her labs. All questions were asked and answered to the best of my ability. The patient verbalized understanding and agrees with the plan above.           Cindy Meneses MD, MD  [facility] Hematology and Oncology  [address] [place] [address]  Office : [phone]  Fax : [phone]

## 2023-02-13 ENCOUNTER — OFFICE VISIT (OUTPATIENT)
Dept: ONCOLOGY | Age: 88
End: 2023-02-13
Payer: MEDICARE

## 2023-02-13 VITALS
HEART RATE: 68 BPM | WEIGHT: 132.7 LBS | BODY MASS INDEX: 22.11 KG/M2 | TEMPERATURE: 98.4 F | SYSTOLIC BLOOD PRESSURE: 145 MMHG | OXYGEN SATURATION: 96 % | DIASTOLIC BLOOD PRESSURE: 79 MMHG | HEIGHT: 65 IN | RESPIRATION RATE: 16 BRPM

## 2023-02-13 DIAGNOSIS — D47.2 MGUS (MONOCLONAL GAMMOPATHY OF UNKNOWN SIGNIFICANCE): Primary | ICD-10-CM

## 2023-02-13 PROCEDURE — G8420 CALC BMI NORM PARAMETERS: HCPCS | Performed by: INTERNAL MEDICINE

## 2023-02-13 PROCEDURE — 1090F PRES/ABSN URINE INCON ASSESS: CPT | Performed by: INTERNAL MEDICINE

## 2023-02-13 PROCEDURE — 1036F TOBACCO NON-USER: CPT | Performed by: INTERNAL MEDICINE

## 2023-02-13 PROCEDURE — 99214 OFFICE O/P EST MOD 30 MIN: CPT | Performed by: INTERNAL MEDICINE

## 2023-02-13 PROCEDURE — 1123F ACP DISCUSS/DSCN MKR DOCD: CPT | Performed by: INTERNAL MEDICINE

## 2023-02-13 PROCEDURE — G8484 FLU IMMUNIZE NO ADMIN: HCPCS | Performed by: INTERNAL MEDICINE

## 2023-02-13 PROCEDURE — G8427 DOCREV CUR MEDS BY ELIG CLIN: HCPCS | Performed by: INTERNAL MEDICINE

## 2023-02-13 ASSESSMENT — PATIENT HEALTH QUESTIONNAIRE - PHQ9
SUM OF ALL RESPONSES TO PHQ9 QUESTIONS 1 & 2: 0
1. LITTLE INTEREST OR PLEASURE IN DOING THINGS: 0
2. FEELING DOWN, DEPRESSED OR HOPELESS: 0
SUM OF ALL RESPONSES TO PHQ QUESTIONS 1-9: 0

## 2023-02-13 NOTE — PATIENT INSTRUCTIONS
Patient Instructions from Today's Visit    Reason for Visit:  Follow up    Diagnosis Information:  [email or web address] net/about-us/asco-answers-patient-education-materials/ehej-hmtlnzj-gthu-sheets      Plan:  Scan will be due soon. The number will be provided to you to schedule at your convenience.     Follow Up:  2 and a half months with labs    Recent Lab Results:  Hospital Outpatient Visit on 02/06/2023   Component Date Value Ref Range Status    WBC 02/06/2023 5.0  4.3 - 11.1 K/uL Final    RBC 02/06/2023 4.51  4.05 - 5.2 M/uL Final    Hemoglobin 02/06/2023 13.5  11.7 - 15.4 g/dL Final    Hematocrit 02/06/2023 41.5  35.8 - 46.3 % Final    MCV 02/06/2023 92.0  82.0 - 102.0 FL Final    MCH 02/06/2023 29.9  26.1 - 32.9 PG Final    MCHC 02/06/2023 32.5  31.4 - 35.0 g/dL Final    RDW 02/06/2023 14.4  11.9 - 14.6 % Final    Platelets 81/66/1531 256  150 - 450 K/uL Final    MPV 02/06/2023 9.0 (A)  9.4 - 12.3 FL Final    nRBC 02/06/2023 0.00  0.0 - 0.2 K/uL Final    **Note: Absolute NRBC parameter is now reported with Hemogram**    Seg Neutrophils 02/06/2023 66  43 - 78 % Final    Lymphocytes 02/06/2023 23  13 - 44 % Final    Monocytes 02/06/2023 10  4.0 - 12.0 % Final    Eosinophils % 02/06/2023 1  0.5 - 7.8 % Final    Basophils 02/06/2023 0  0.0 - 2.0 % Final    Immature Granulocytes 02/06/2023 0  0.0 - 5.0 % Final    Segs Absolute 02/06/2023 3.3  1.7 - 8.2 K/UL Final    Absolute Lymph # 02/06/2023 1.1  0.5 - 4.6 K/UL Final    Absolute Mono # 02/06/2023 0.5  0.1 - 1.3 K/UL Final    Absolute Eos # 02/06/2023 0.0  0.0 - 0.8 K/UL Final    Basophils Absolute 02/06/2023 0.0  0.0 - 0.2 K/UL Final    Absolute Immature Granulocyte 02/06/2023 0.0  0.0 - 0.5 K/UL Final    Differential Type 02/06/2023 AUTOMATED    Final    Sodium 02/06/2023 138  133 - 143 mmol/L Final    Potassium 02/06/2023 3.9  3.5 - 5.1 mmol/L Final    Chloride 02/06/2023 103  101 - 110 mmol/L Final    CO2 02/06/2023 31  21 - 32 mmol/L Final    Anion Gap 02/06/2023 4  2 - 11 mmol/L Final    Glucose 02/06/2023 85  65 - 100 mg/dL Final    BUN 02/06/2023 19  8 - 23 MG/DL Final    Creatinine 02/06/2023 0.90  0.6 - 1.0 MG/DL Final    Est, Glosuleiman Filt Rate 02/06/2023 >60  >60 ml/min/1.73m2 Final    Comment:      Pediatric calculator link: [employer].at. org/professionals/kdoqi/gfr_calculatorped       These results are not intended for use in patients <25years of age. eGFR results are calculated without a race factor using  the 2021 CKD-EPI equation. Careful clinical correlation is recommended, particularly when comparing to results calculated using previous equations. The CKD-EPI equation is less accurate in patients with extremes of muscle mass, extra-renal metabolism of creatinine, excessive creatine ingestion, or following therapy that affects renal tubular secretion.       Calcium 02/06/2023 11.0 (A)  8.3 - 10.4 MG/DL Final    Total Bilirubin 02/06/2023 0.4  0.2 - 1.1 MG/DL Final    ALT 02/06/2023 19  12 - 65 U/L Final    AST 02/06/2023 15  15 - 37 U/L Final    Alk Phosphatase 02/06/2023 88  50 - 136 U/L Final    Total Protein 02/06/2023 7.5  6.3 - 8.2 g/dL Final    Albumin 02/06/2023 3.4  3.2 - 4.6 g/dL Final    Globulin 02/06/2023 4.1  2.8 - 4.5 g/dL Final    Albumin/Globulin Ratio 02/06/2023 0.8  0.4 - 1.6   Final    IgG, Serum 02/06/2023 1,444  586 - 1,602 mg/dL Final    IgA 02/06/2023 76  64 - 422 mg/dL Final    IgM 02/06/2023 28  26 - 217 mg/dL Final    Total Protein 02/06/2023 6.7  6.0 - 8.5 g/dL Final    Albumin 02/06/2023 3.4  2.9 - 4.4 g/dL Final    Alpha 1 Globulin 02/06/2023 0.3  0.0 - 0.4 g/dL Final    Alpha 2 Globulin 02/06/2023 0.8  0.4 - 1.0 g/dL Final    BETA GLOBULIN 02/06/2023 1.1  0.7 - 1.3 g/dL Final    GAMMA GLOBULIN 02/06/2023 1.2  0.4 - 1.8 g/dL Final    M-Lm 02/06/2023 0.8 (A)  Not Observed g/dL Final    Globulin 02/06/2023 3.3  2.2 - 3.9 g/dL Final    A/G Ratio 02/06/2023 1.1  0.7 - 1.7   Final    IMMUNOFIXATION RESULT 02/06/2023 Comment (A)    Final    Comment: (NOTE)  Immunofixation shows IgG monoclonal protein with kappa light chain  specificity. PLEASE NOTE:  Samples from patients receiving DARZALEX(R) (daratumumab) or  SARCLISA(R)(isatuximab-irfc) treatment can appear as an  \"IgG kappa\" and mask a complete response (CR). If this patient  is receiving these therapies, this PHAM assay interference  can be removed by ordering test number [id number]-\"Immunofixation,  Daratumumab-Specific, Serum\" or [id number]-\"Immunofixation,  Isatuximab-Specific, Serum\" and submitting a new sample for  testing or by calling the lab to add this test to the current  sample. Free Kappa Light Chains 02/06/2023 771.4 (A)  3.3 - 19.4 mg/L Final    Free Lambda Light Chains 02/06/2023 14.8  5.7 - 26.3 mg/L Final    K/L RATIO 02/06/2023 52.12 (A)  0.26 - 1.65   Final    Comment: (NOTE)  Performed At: [facility] [address] [place] [id number]  Benson Howell MD PY:[id number]      Protein, Total Urine 02/06/2023 9.2  Not Estab. mg/dL Final    Albumin, 24 Hr Urine 02/06/2023 26.6  % Final    Alpha-1-Globulin, U 02/06/2023 9.7  % Final    Alpha-2-Globulin, U 02/06/2023 10.9  % Final    Beta Globulin, U 02/06/2023 13.3  % Final    Gamma Globulin, U 02/06/2023 39.5  % Final    M-Lm, % 02/06/2023 31.9 (A)  Not Observed % Final    Immunofixation Result, Urine 02/06/2023 Comment (A)    Final    Comment: (NOTE)  Bence Fermin Protein positive; kappa type. Note: 02/06/2023 Comment    Final    Comment: (NOTE)  Protein electrophoresis scan will follow via computer, mail, or   delivery.   Performed At: [facility], [place] [id number]  Benson Howell MD AX:[id number]           Treatment Summary has been discussed and given to patient: n/a        -------------------------------------------------------------------------------------------------------------------  Please call our office at [phone] if you have any of the following symptoms:   Fever of 100.5 or greater  Chills  Shortness of breath  Swelling or pain in one leg    After office hours an answering service is available and will contact a provider for emergencies or if you are experiencing any of the above symptoms. Patient did express an interest in My Chart. My Chart log in information explained on the after visit summary printout at the [facility] [address] desk.     Jp Luna RN

## 2023-02-16 ENCOUNTER — OFFICE VISIT (OUTPATIENT)
Dept: INTERNAL MEDICINE CLINIC | Facility: CLINIC | Age: 88
End: 2023-02-16
Payer: MEDICARE

## 2023-02-16 VITALS
OXYGEN SATURATION: 97 % | WEIGHT: 132.6 LBS | HEART RATE: 64 BPM | SYSTOLIC BLOOD PRESSURE: 130 MMHG | BODY MASS INDEX: 22.24 KG/M2 | DIASTOLIC BLOOD PRESSURE: 70 MMHG

## 2023-02-16 DIAGNOSIS — Z79.01 CHRONIC ANTICOAGULATION: ICD-10-CM

## 2023-02-16 DIAGNOSIS — I48.0 PAF (PAROXYSMAL ATRIAL FIBRILLATION) (HCC): ICD-10-CM

## 2023-02-16 DIAGNOSIS — E55.9 HYPOVITAMINOSIS D: ICD-10-CM

## 2023-02-16 DIAGNOSIS — E05.00 GRAVES' DISEASE: Chronic | ICD-10-CM

## 2023-02-16 DIAGNOSIS — E03.9 PRIMARY HYPOTHYROIDISM: Primary | Chronic | ICD-10-CM

## 2023-02-16 PROCEDURE — 99214 OFFICE O/P EST MOD 30 MIN: CPT | Performed by: NURSE PRACTITIONER

## 2023-02-16 PROCEDURE — 1090F PRES/ABSN URINE INCON ASSESS: CPT | Performed by: NURSE PRACTITIONER

## 2023-02-16 PROCEDURE — 1036F TOBACCO NON-USER: CPT | Performed by: NURSE PRACTITIONER

## 2023-02-16 PROCEDURE — G8484 FLU IMMUNIZE NO ADMIN: HCPCS | Performed by: NURSE PRACTITIONER

## 2023-02-16 PROCEDURE — 1123F ACP DISCUSS/DSCN MKR DOCD: CPT | Performed by: NURSE PRACTITIONER

## 2023-02-16 PROCEDURE — G8420 CALC BMI NORM PARAMETERS: HCPCS | Performed by: NURSE PRACTITIONER

## 2023-02-16 PROCEDURE — G8427 DOCREV CUR MEDS BY ELIG CLIN: HCPCS | Performed by: NURSE PRACTITIONER

## 2023-02-16 SDOH — ECONOMIC STABILITY: INCOME INSECURITY: HOW HARD IS IT FOR YOU TO PAY FOR THE VERY BASICS LIKE FOOD, HOUSING, MEDICAL CARE, AND HEATING?: NOT HARD AT ALL

## 2023-02-16 SDOH — ECONOMIC STABILITY: HOUSING INSECURITY
IN THE LAST 12 MONTHS, WAS THERE A TIME WHEN YOU DID NOT HAVE A STEADY PLACE TO SLEEP OR SLEPT IN A SHELTER (INCLUDING NOW)?: NO

## 2023-02-16 SDOH — ECONOMIC STABILITY: FOOD INSECURITY: WITHIN THE PAST 12 MONTHS, YOU WORRIED THAT YOUR FOOD WOULD RUN OUT BEFORE YOU GOT MONEY TO BUY MORE.: NEVER TRUE

## 2023-02-16 SDOH — ECONOMIC STABILITY: FOOD INSECURITY: WITHIN THE PAST 12 MONTHS, THE FOOD YOU BOUGHT JUST DIDN'T LAST AND YOU DIDN'T HAVE MONEY TO GET MORE.: NEVER TRUE

## 2023-02-16 ASSESSMENT — PATIENT HEALTH QUESTIONNAIRE - PHQ9
SUM OF ALL RESPONSES TO PHQ QUESTIONS 1-9: 0
SUM OF ALL RESPONSES TO PHQ QUESTIONS 1-9: 0
1. LITTLE INTEREST OR PLEASURE IN DOING THINGS: 0
SUM OF ALL RESPONSES TO PHQ9 QUESTIONS 1 & 2: 0
SUM OF ALL RESPONSES TO PHQ QUESTIONS 1-9: 0
2. FEELING DOWN, DEPRESSED OR HOPELESS: 0
SUM OF ALL RESPONSES TO PHQ QUESTIONS 1-9: 0

## 2023-02-16 NOTE — PROGRESS NOTES
PROGRESS NOTE    SUBJECTIVE:   Sheba Bob is a 80 y.o. female seen for a follow up visit for   Chief Complaint   Patient presents with    Hypothyroidism     HPI  Hypothyroid Graves Disease -- ophthalmic issues. Onset:  remote. PAF -- when I eat too much and have gas on stomach increase heart rate and when I stop burping it slows back down. LBP with sciatica -- stiffness in mornings which improves after 10 steps. Pain radiates into the left hip at times. Occasionally radiates to bilateral legs. Uses Tylenol and rarely takes one advil 200 mg. Chronic Right shoulder bursitis - drained x5 times and returns. Also gets steroid injection into bursa. Cyst -- left wrist present for several years. Also on right finger present for several years. Osteoporosis -- reclast.  Followed by Rheumatology. Occasional leakage of urine -- using Premarin cream by GYN.  (Hx ovarian cancer)    MGUS -- followed by oncology Dr. Marixa Palafox          Reviewed and updated this visit by provider:  Tobacco  Allergies  Meds  Problems  Med Hx  Surg Hx  Fam Hx         Review of Systems   Constitutional:  Negative for chills, fatigue and fever. Respiratory:  Negative for chest tightness, shortness of breath and wheezing. Cardiovascular:  Negative for chest pain, palpitations and leg swelling. Gastrointestinal:  Negative for abdominal pain. Endocrine: Negative for polydipsia, polyphagia and polyuria. Genitourinary:  Negative for frequency. Occasional incontinence of urine -- using Premarin (by GYN)   Musculoskeletal:  Positive for arthralgias (hips), back pain, gait problem and joint swelling (shoulder). Skin:  Negative for rash. Neurological:  Negative for weakness, numbness and headaches. Psychiatric/Behavioral:  Negative for dysphoric mood. The patient is not nervous/anxious.        OBJECTIVE:    /70 (Site: Left Upper Arm, Position: Sitting, Cuff Size: Small Adult)   Pulse 64   Wt 132 lb 9.6 oz (60.1 kg)   SpO2 97%   BMI 22.24 kg/m²      Physical Exam  Vitals and nursing note reviewed. Constitutional:       Appearance: Normal appearance. She is well-groomed. HENT:      Head: Normocephalic. Right Ear: Hearing and external ear normal.      Left Ear: Hearing and external ear normal.      Mouth/Throat:      Lips: Pink. Mouth: Mucous membranes are moist.   Eyes:      General: Lids are normal.      Conjunctiva/sclera: Conjunctivae normal.   Cardiovascular:      Rate and Rhythm: Normal rate and regular rhythm. Pulmonary:      Effort: Pulmonary effort is normal.      Breath sounds: Normal breath sounds. Musculoskeletal:      Right lower leg: No edema. Left lower leg: No edema. Comments: Cyst-like structure on right 4th finger between DIP and MIP. Posterior/upper trapezius cyst-like structure decreased with persistent tenderness. Skin:     General: Skin is warm and dry. Findings: No rash. Neurological:      Mental Status: She is alert and oriented to person, place, and time. Cranial Nerves: No dysarthria or facial asymmetry. Motor: Motor function is intact. Gait: Gait is intact. Gait normal.   Psychiatric:         Attention and Perception: Attention normal.         Mood and Affect: Mood normal.         Behavior: Behavior normal. Behavior is cooperative. ASSESSMENT and PLAN    1. Primary hypothyroidism  -     TSH; Future  2. Graves' disease  -     TSH; Future  3. PAF (paroxysmal atrial fibrillation) ([facility] [address]  4. Chronic anticoagulation  5. Hypovitaminosis D    Return in about 3 months (around 5/16/2023) for Follow-Up, Hypothyroidism, with labs. current treatment plan is effective, no change in therapy  lab results and schedule of future lab studies reviewed with patient  reviewed diet, exercise and weight control  reviewed medications and side effects in detail  TSH 4.220.  discussed with patient. plan recheck in 3 months.   She is agreeable. Heart rate controlled. CBC already ordered through oncology. Will review results ordered by oncology. Vitamin D is also already ordered through oncology. On this date 02/16/23 I have spent 30 minutes reviewing previous notes, test results and face to face with the patient. Over 50% of today's office visit was spent in face to face time in counseling reviewing test results, importance of compliance, education about disease process, benefits and side-effects of medications and follow-up plan. JAZMIN Mejia NP    Dictated using voice recognition software.  Proofread, but unrecognized voice recognition errors may exist.

## 2023-02-26 PROBLEM — E04.1 THYROID NODULE: Status: RESOLVED | Noted: 2022-11-03 | Resolved: 2023-02-26

## 2023-02-26 ASSESSMENT — ENCOUNTER SYMPTOMS
WHEEZING: 0
ABDOMINAL PAIN: 0
CHEST TIGHTNESS: 0
BACK PAIN: 1
SHORTNESS OF BREATH: 0

## 2023-03-27 ENCOUNTER — TELEPHONE (OUTPATIENT)
Dept: ONCOLOGY | Age: 88
End: 2023-03-27

## 2023-03-29 ENCOUNTER — TELEPHONE (OUTPATIENT)
Dept: ONCOLOGY | Age: 88
End: 2023-03-29

## 2023-04-03 ENCOUNTER — TELEPHONE (OUTPATIENT)
Dept: ONCOLOGY | Age: 88
End: 2023-04-03

## 2023-04-05 ENCOUNTER — CLINICAL DOCUMENTATION (OUTPATIENT)
Dept: ONCOLOGY | Age: 88
End: 2023-04-05

## 2023-04-05 NOTE — PROGRESS NOTES
Call to pt re hr questions from triage. She has been having some back discomfort for which she started PT with some improvement. We reviewed her PET from Dec and also MR from Jan again going overall areas of abnormality. Although she notes improvement in her back/leg discomfort, she also notes some urinary incontinence (which is somewhat dependent on the amount of PO liquid intake). This occurs when she gets up from sitting positing. I will request new MR lumbar/pelvis - RN to call pt with apt. Of note, pt reported BP of 160/106 w HR 30s - on repeat it was actually 172/101 w HR 75. She took her BB and repeat at end of our conversation was down to 152/80s with normal HR. She appreciated my call and explanation.

## 2023-04-06 ENCOUNTER — HOSPITAL ENCOUNTER (OUTPATIENT)
Age: 88
Discharge: HOME | End: 2023-04-08
Payer: MEDICARE

## 2023-04-06 ENCOUNTER — CLINICAL DOCUMENTATION (OUTPATIENT)
Dept: ONCOLOGY | Age: 88
End: 2023-04-06

## 2023-04-06 DIAGNOSIS — M54.9 DISCOMFORT OF BACK: Primary | ICD-10-CM

## 2023-04-06 DIAGNOSIS — R32 URINARY INCONTINENCE, UNSPECIFIED TYPE: ICD-10-CM

## 2023-04-06 DIAGNOSIS — M54.9 DISCOMFORT OF BACK: ICD-10-CM

## 2023-04-06 PROCEDURE — A9579 GAD-BASE MR CONTRAST NOS,1ML: HCPCS | Performed by: INTERNAL MEDICINE

## 2023-04-06 PROCEDURE — 2580000003 HC RX 258: Performed by: INTERNAL MEDICINE

## 2023-04-06 PROCEDURE — 6360000004 HC RX CONTRAST MEDICATION: Performed by: INTERNAL MEDICINE

## 2023-04-06 PROCEDURE — 72197 MRI PELVIS W/O & W/DYE: CPT

## 2023-04-06 PROCEDURE — 72158 MRI LUMBAR SPINE W/O & W/DYE: CPT

## 2023-04-06 RX ORDER — SODIUM CHLORIDE 0.9 % (FLUSH) 0.9 %
20 SYRINGE (ML) INJECTION AS NEEDED
Status: DISCONTINUED | OUTPATIENT
Start: 2023-04-06 | End: 2023-04-09 | Stop reason: HOSPADM

## 2023-04-06 RX ADMIN — SODIUM CHLORIDE, PRESERVATIVE FREE 20 ML: 5 INJECTION INTRAVENOUS at 13:55

## 2023-04-06 RX ADMIN — GADOTERIDOL 12 ML: 279.3 INJECTION, SOLUTION INTRAVENOUS at 13:55

## 2023-04-06 NOTE — PROGRESS NOTES
Pt's MRIs reviewed. I discussed the case with IR/Dr Khloe Yen and Dr Benja Brand ID. Will get labs - cbc/bld cultures x 2 sites/cmp/sed rate/CRP. She will also be referred for CT guided bx of suspected discitis/OM. Tried to reach pt to discuss but no answer. Left VM for her to call us back. I will not be in the office tomorrow, but can be reached. RN made aware of plan.

## 2023-04-07 ENCOUNTER — CLINICAL DOCUMENTATION (OUTPATIENT)
Dept: ONCOLOGY | Age: 88
End: 2023-04-07

## 2023-04-07 DIAGNOSIS — R93.7 ABNORMAL FINDINGS ON DIAGNOSTIC IMAGING OF SPINE: Primary | ICD-10-CM

## 2023-04-07 DIAGNOSIS — D47.2 MGUS (MONOCLONAL GAMMOPATHY OF UNKNOWN SIGNIFICANCE): Primary | ICD-10-CM

## 2023-04-07 DIAGNOSIS — R93.7 ABNORMAL FINDINGS ON DIAGNOSTIC IMAGING OF SPINE: ICD-10-CM

## 2023-04-13 ENCOUNTER — HOSPITAL ENCOUNTER (OUTPATIENT)
Dept: CT IMAGING | Age: 88
Discharge: HOME | End: 2023-04-16
Payer: MEDICARE

## 2023-04-13 VITALS
HEART RATE: 75 BPM | TEMPERATURE: 97.4 F | RESPIRATION RATE: 18 BRPM | SYSTOLIC BLOOD PRESSURE: 164 MMHG | OXYGEN SATURATION: 95 % | DIASTOLIC BLOOD PRESSURE: 74 MMHG

## 2023-04-13 DIAGNOSIS — R93.7 ABNORMAL FINDINGS ON DIAGNOSTIC IMAGING OF SPINE: ICD-10-CM

## 2023-04-13 PROCEDURE — 72192 CT PELVIS W/O DYE: CPT

## 2023-04-13 NOTE — DISCHARGE INSTRUCTIONS
[facility]     Department of Interventional Radiology     [facility]     [phone] Office     [phone] Fax       GENERAL POST PROCEDURE DISCHARGE INSTRUCTIONS:               Home Care Instructions: You can resume your regular diet and medication regimen. The results from your exam will be available to your regular doctor in 3-5 business days. You should relax for the rest of the day, and you will need someone to drive you home. For the next 24 hours, you should not drink alcohol, drive or take any sedative medications. Your child may be irritable, restless, or crying upon waking. This will pass, but may take several hours. Do not feed your child until he/she is fully awake. Call If:        You should call your Physician and/or the Radiology Nurse if you have any allergic reaction, which includes swelling, itching, rash, welts, and/or shortness of breath. Follow-Up Instructions: Please see your ordering doctor as he/she has requested. To Reach Us: If you have any questions about your procedure, please call the Interventional Radiology department at [phone]. After business hours (5pm) and weekends, call the answering service at [phone] and ask for the Radiologist on call to be paged. Si tiene Preguntas acerca del procedimiento, por favor llame al departamento de Radiología Intervencional al [phone]. Después de horas de oficina (5 pm) y los fines de [place], llamar al Abbie Mathew al [phone] y pregunte por el Radiologo de [place].       Interventional Radiology General Nurse Discharge    After general anesthesia or intravenous sedation, for 24 hours or while taking prescription Narcotics:  Limit your activities  Do not drive and operate hazardous machinery  Do not make important personal or business decisions  Do  not drink alcoholic beverages  If you have not urinated within 8

## 2023-04-13 NOTE — H&P
(LUMIFY) 0.025 % SOLN Place 1 drop into both eyes as needed    Historical Provider, MD   ALPRAZolam (XANAX) 0.25 MG tablet 1 po daily, prn anxiety  Patient not taking: No sig reported 2/25/20   Ar Automatic Reconciliation   coenzyme Q10 100 MG CAPS capsule Take 100 mg by mouth daily    Ar Automatic Reconciliation   conjugated estrogens (PREMARIN) 0.625 MG/GM vaginal cream Place 0.5 g vaginally every 7 days    Ar Automatic Reconciliation   mometasone (ELOCON) 0.1 % lotion APPLY TO SCALP TWICE DAILY FOR [facility] AS NEEDED 7/20/21   Ar Automatic Reconciliation        No Known Allergies    Family History   Problem Relation Age of Onset    Rheum Arthritis Son     Diabetes Father     Diabetes Other     Hypertension Other     Heart Disease Other     Stroke Sister     Breast Cancer Sister 80    Cancer Sister     Heart Failure Father     Stroke Other     Osteoporosis Mother     Heart Failure Mother     Stroke Mother     Heart Disease Mother     Stroke Father     Thyroid Disease Neg Hx      Social History     Tobacco Use    Smoking status: Never    Smokeless tobacco: Never   Substance Use Topics    Alcohol use: Yes        Not in a hospital admission. Objective:       Physical Examination:    There were no vitals filed for this visit.     Pain Assessment                   0                                  HEART: regular rate and rhythm, S1, S2 normal, no murmur, click, rub or gallop  LUNG: clear to auscultation bilaterally  ABDOMEN: soft, non-tender; bowel sounds normal; no masses,  no organomegaly  EXTREMITIES:   No pedal edema    Laboratory:     Lab Results   Component Value Date/Time     04/10/2023 10:58 AM     02/06/2023 10:35 AM    K 3.8 04/10/2023 10:58 AM    K 3.9 02/06/2023 10:35 AM     04/10/2023 10:58 AM     02/06/2023 10:35 AM    CO2 28 04/10/2023 10:58 AM    CO2 31 02/06/2023 10:35 AM    BUN 19 04/10/2023 10:58 AM    BUN 19 02/06/2023 10:35 AM    GFRAA >60 07/28/2022 11:42 AM    GFRAA >60

## 2023-04-13 NOTE — PRE SEDATION
Sedation Pre-Procedure Note    Patient Name: Lexa Cote   YOB: 1934  Room/Bed: Room/bed info not found  Medical Record Number: [id number]  Date: 4/13/2023   Time: 11:35 AM       Indication:  Discitis/osteomyelitis at L5-S1    Consent: I have discussed with the patient and/or the patient representative the indication, alternatives, and the possible risks and/or complications of the planned procedure and the anesthesia methods. The patient and/or patient representative appear to understand and agree to proceed. Vital Signs: There were no vitals filed for this visit. Past Medical History:   has a past medical history of Anxiety disorder, Aortic regurgitation, Asymptomatic bilateral carotid artery stenosis, Diverticulosis of large intestine, GERD (gastroesophageal reflux disease), Graves disease, History of colon polyps, History of herpes zoster, History of ovarian cancer, Hypertension, Irritable bowel syndrome, Malignant neoplasm of rectosigmoid junction ([address] Menopause, Mitral valve disorders(424.0), Mixed hyperlipidemia, Occlusion and stenosis of carotid artery without mention of cerebral infarction, Osteoarthritis, Osteopenia, Paroxysmal atrial fibrillation ([address] Postmenopausal atrophic vaginitis, Primary hypothyroidism, Thyroid nodule, Vaginal atrophy, and Vitamin D deficiency. Past Surgical History:   has a past surgical history that includes Carpal tunnel release (Left, 2009); Total knee arthroplasty (Left, 2004); vitrectomy (Right); Colonoscopy (12/2014); Total abdominal hysterectomy w/ bilateral salpingoophorectomy (1977); Cataract removal (Right, 2/2013); Tonsillectomy; Total abdominal hysterectomy w/ bilateral salpingoophorectomy (1977); total colectomy; Breast cyst incision and drainage (Left); Cardiac catheterization (12/9/2015); Anterior cruciate ligament repair (Left, 2014);  Small intestine surgery (12/2014 and 1/2015); and Thyroidectomy

## 2023-05-02 ENCOUNTER — HOSPITAL ENCOUNTER (OUTPATIENT)
Dept: LAB | Age: 88
Discharge: HOME | End: 2023-05-05
Payer: MEDICARE

## 2023-05-02 DIAGNOSIS — D47.2 MGUS (MONOCLONAL GAMMOPATHY OF UNKNOWN SIGNIFICANCE): ICD-10-CM

## 2023-05-02 LAB
25(OH)D3 SERPL-MCNC: 53.9 NG/ML (ref 30–100)
ALBUMIN SERPL-MCNC: 3.3 G/DL (ref 3.2–4.6)
ALBUMIN/GLOB SERPL: 0.8 (ref 0.4–1.6)
ALP SERPL-CCNC: 85 U/L (ref 50–136)
ALT SERPL-CCNC: 18 U/L (ref 12–65)
ANION GAP SERPL CALC-SCNC: 5 MMOL/L (ref 2–11)
AST SERPL-CCNC: 16 U/L (ref 15–37)
BASOPHILS # BLD: 0 K/UL (ref 0–0.2)
BASOPHILS NFR BLD: 0 % (ref 0–2)
BILIRUB SERPL-MCNC: 0.5 MG/DL (ref 0.2–1.1)
BUN SERPL-MCNC: 19 MG/DL (ref 8–23)
CALCIUM SERPL-MCNC: 10 MG/DL (ref 8.3–10.4)
CHLORIDE SERPL-SCNC: 102 MMOL/L (ref 101–110)
CO2 SERPL-SCNC: 29 MMOL/L (ref 21–32)
CREAT SERPL-MCNC: 0.8 MG/DL (ref 0.6–1)
DIFFERENTIAL METHOD BLD: ABNORMAL
EOSINOPHIL # BLD: 0 K/UL (ref 0–0.8)
EOSINOPHIL NFR BLD: 0 % (ref 0.5–7.8)
ERYTHROCYTE [DISTWIDTH] IN BLOOD BY AUTOMATED COUNT: 14.1 % (ref 11.9–14.6)
GLOBULIN SER CALC-MCNC: 4.2 G/DL (ref 2.8–4.5)
GLUCOSE SERPL-MCNC: 102 MG/DL (ref 65–100)
HCT VFR BLD AUTO: 38.5 % (ref 35.8–46.3)
HGB BLD-MCNC: 12.4 G/DL (ref 11.7–15.4)
IMM GRANULOCYTES # BLD AUTO: 0 K/UL (ref 0–0.5)
IMM GRANULOCYTES NFR BLD AUTO: 1 % (ref 0–5)
LYMPHOCYTES # BLD: 0.8 K/UL (ref 0.5–4.6)
LYMPHOCYTES NFR BLD: 14 % (ref 13–44)
MCH RBC QN AUTO: 30 PG (ref 26.1–32.9)
MCHC RBC AUTO-ENTMCNC: 32.2 G/DL (ref 31.4–35)
MCV RBC AUTO: 93.2 FL (ref 82–102)
MONOCYTES # BLD: 0.5 K/UL (ref 0.1–1.3)
MONOCYTES NFR BLD: 8 % (ref 4–12)
NEUTS SEG # BLD: 4.4 K/UL (ref 1.7–8.2)
NEUTS SEG NFR BLD: 77 % (ref 43–78)
NRBC # BLD: 0 K/UL (ref 0–0.2)
PLATELET # BLD AUTO: 299 K/UL (ref 150–450)
PMV BLD AUTO: 9 FL (ref 9.4–12.3)
POTASSIUM SERPL-SCNC: 4.3 MMOL/L (ref 3.5–5.1)
PROT SERPL-MCNC: 7.5 G/DL (ref 6.3–8.2)
RBC # BLD AUTO: 4.13 M/UL (ref 4.05–5.2)
SODIUM SERPL-SCNC: 136 MMOL/L (ref 133–143)
WBC # BLD AUTO: 5.7 K/UL (ref 4.3–11.1)

## 2023-05-02 PROCEDURE — 84156 ASSAY OF PROTEIN URINE: CPT

## 2023-05-02 PROCEDURE — 83521 IG LIGHT CHAINS FREE EACH: CPT

## 2023-05-02 PROCEDURE — 82784 ASSAY IGA/IGD/IGG/IGM EACH: CPT

## 2023-05-02 PROCEDURE — 36415 COLL VENOUS BLD VENIPUNCTURE: CPT

## 2023-05-02 PROCEDURE — 80053 COMPREHEN METABOLIC PANEL: CPT

## 2023-05-02 PROCEDURE — 85025 COMPLETE CBC W/AUTO DIFF WBC: CPT

## 2023-05-02 PROCEDURE — 82306 VITAMIN D 25 HYDROXY: CPT

## 2023-05-02 PROCEDURE — 86334 IMMUNOFIX E-PHORESIS SERUM: CPT

## 2023-05-02 PROCEDURE — 84165 PROTEIN E-PHORESIS SERUM: CPT

## 2023-05-02 PROCEDURE — 84166 PROTEIN E-PHORESIS/URINE/CSF: CPT

## 2023-05-03 LAB
KAPPA LC FREE SER-MCNC: 748.2 MG/L (ref 3.3–19.4)
KAPPA LC FREE/LAMBDA FREE SER: 69.28 (ref 0.26–1.65)
LAMBDA LC FREE SERPL-MCNC: 10.8 MG/L (ref 5.7–26.3)

## 2023-05-04 ENCOUNTER — CLINICAL DOCUMENTATION (OUTPATIENT)
Dept: RHEUMATOLOGY | Age: 88
End: 2023-05-04

## 2023-05-04 NOTE — PROGRESS NOTES
Patient called asking for referral to pain management asap. Swp, she is wanting to cancel office visit with \"dr hilario\" but she is unsure of what she sees this MD for. Dwp that dr Tanner Olivier is out of the office this week and will be back next Thursday, the day of her next visit with him. Suggested that she reach out to her PCP or dr Stuart Cespedes who has placed several referrals for pt and discuss why/where she would like to go, send [employer] message with detail if she wishes instead of phone call. Patient wants referral asap and says she is seeing too many physicians.

## 2023-05-05 ENCOUNTER — TELEPHONE (OUTPATIENT)
Dept: ONCOLOGY | Age: 88
End: 2023-05-05

## 2023-05-05 DIAGNOSIS — D47.2 MGUS (MONOCLONAL GAMMOPATHY OF UNKNOWN SIGNIFICANCE): Primary | ICD-10-CM

## 2023-05-05 LAB
ALBUMIN MFR UR ELPH: 8.8 %
ALBUMIN SERPL ELPH-MCNC: 3.5 G/DL (ref 2.9–4.4)
ALBUMIN/GLOB SERPL: 1.1 (ref 0.7–1.7)
ALPHA1 GLOB MFR UR ELPH: 3.2 %
ALPHA1 GLOB SERPL ELPH-MCNC: 0.3 G/DL (ref 0–0.4)
ALPHA2 GLOB 24H MFR UR ELPH: 9.1 %
ALPHA2 GLOB SERPL ELPH-MCNC: 0.8 G/DL (ref 0.4–1)
B-GLOBULIN 24H MFR UR ELPH: 21.2 %
B-GLOBULIN SERPL ELPH-MCNC: 0.9 G/DL (ref 0.7–1.3)
GAMMA GLOB 24H MFR UR ELPH: 57.8 %
GAMMA GLOB SERPL ELPH-MCNC: 1.1 G/DL (ref 0.4–1.8)
GLOBULIN SER-MCNC: 3.2 G/DL (ref 2.2–3.9)
IGA SERPL-MCNC: 66 MG/DL (ref 64–422)
IGG SERPL-MCNC: 1265 MG/DL (ref 586–1602)
IGM SERPL-MCNC: 24 MG/DL (ref 26–217)
INTERPRETATION SERPL IEP-IMP: ABNORMAL
LABORATORY COMMENT REPORT: ABNORMAL
M PROTEIN MFR UR ELPH: 48.8 %
M PROTEIN SERPL ELPH-MCNC: 0.8 G/DL
PROT SERPL-MCNC: 6.7 G/DL (ref 6–8.5)
PROT UR-MCNC: 32 MG/DL

## 2023-05-08 NOTE — TELEPHONE ENCOUNTER
OK per Dr. Estella Larios to place an order for pain management for patient.  Referral placed and message to  to push the referral through

## 2023-05-11 ENCOUNTER — NURSE ONLY (OUTPATIENT)
Dept: RHEUMATOLOGY | Age: 88
End: 2023-05-11
Payer: MEDICARE

## 2023-05-11 ENCOUNTER — OFFICE VISIT (OUTPATIENT)
Dept: RHEUMATOLOGY | Age: 88
End: 2023-05-11
Payer: MEDICARE

## 2023-05-11 VITALS — HEART RATE: 65 BPM | TEMPERATURE: 97.2 F | DIASTOLIC BLOOD PRESSURE: 74 MMHG | SYSTOLIC BLOOD PRESSURE: 129 MMHG

## 2023-05-11 VITALS
BODY MASS INDEX: 22.2 KG/M2 | WEIGHT: 130 LBS | RESPIRATION RATE: 14 BRPM | HEART RATE: 84 BPM | DIASTOLIC BLOOD PRESSURE: 77 MMHG | SYSTOLIC BLOOD PRESSURE: 150 MMHG | HEIGHT: 64 IN

## 2023-05-11 DIAGNOSIS — M81.0 AGE-RELATED OSTEOPOROSIS WITHOUT CURRENT PATHOLOGICAL FRACTURE: Primary | ICD-10-CM

## 2023-05-11 PROCEDURE — 99213 OFFICE O/P EST LOW 20 MIN: CPT | Performed by: INTERNAL MEDICINE

## 2023-05-11 PROCEDURE — 1090F PRES/ABSN URINE INCON ASSESS: CPT | Performed by: INTERNAL MEDICINE

## 2023-05-11 PROCEDURE — 96365 THER/PROPH/DIAG IV INF INIT: CPT | Performed by: INTERNAL MEDICINE

## 2023-05-11 PROCEDURE — G8420 CALC BMI NORM PARAMETERS: HCPCS | Performed by: INTERNAL MEDICINE

## 2023-05-11 PROCEDURE — 1036F TOBACCO NON-USER: CPT | Performed by: INTERNAL MEDICINE

## 2023-05-11 PROCEDURE — G8427 DOCREV CUR MEDS BY ELIG CLIN: HCPCS | Performed by: INTERNAL MEDICINE

## 2023-05-11 PROCEDURE — 1123F ACP DISCUSS/DSCN MKR DOCD: CPT | Performed by: INTERNAL MEDICINE

## 2023-05-11 RX ORDER — ZOLEDRONIC ACID 5 MG/100ML
5 INJECTION, SOLUTION INTRAVENOUS ONCE
Status: COMPLETED | OUTPATIENT
Start: 2023-05-11 | End: 2023-05-11

## 2023-05-11 RX ORDER — DENOSUMAB 60 MG/ML
60 INJECTION SUBCUTANEOUS ONCE
COMMUNITY

## 2023-05-11 RX ADMIN — ZOLEDRONIC ACID 5 MG: 5 INJECTION, SOLUTION INTRAVENOUS at 11:15

## 2023-05-11 NOTE — PROGRESS NOTES
[address], [address]   Office : [phone], Fax: [phone]       Reclast 5mg/100ml infused today over 30 minutes for safety. Infusion tolerated well without complications. Advised patient to continue taking Calcium and Vitamin D post infusion. Call with any problems or side effects. Infusion placed in right forearm. IV insertion time: 1110  Medication start time: 1115  Medication completion time: 2545    Patient discharged feeling fine and instructed to call the office with any post-infusion issues. Pre-infusion/injection questionairre for osteoporosis    1. Have you had or are you planning any dental work? NO    2. Are you taking your calcium and vitamin D? YES    3. When was your last osteoporosis treatment? 5/5/22    4. Have you had any recent fractures?  NO

## 2023-05-11 NOTE — PROGRESS NOTES
APPLY TO SCALP TWICE DAILY FOR ITCH AS NEEDED      metoprolol succinate (TOPROL-XL) 50 mg XL tablet Take 1 Tablet by mouth daily. 90 Tablet 3    XIIDRA 5 % dpet       fish oil-omega-3 fatty acids 340-1,000 mg capsule Take 1 Cap by mouth daily. ALPRAZolam (XANAX) 0.25 mg tablet 1 po daily, prn anxiety 30 Tab 5    co-enzyme Q-10 (CO Q-10) 100 mg capsule Take 100 mg by mouth daily. B.infantis-B.ani-B.long-B.bifi (PROBIOTIC 4X) 10-15 mg TbEC Take  by mouth daily. conjugated estrogens (PREMARIN) 0.625 mg/gram vaginal cream Insert 0.5 g into vagina every seven (7) days. multivitamin (ONE A DAY) tablet Take 1 Tab by mouth nightly.        No Known Allergies  Social History     Tobacco Use    Smoking status: Never Smoker    Smokeless tobacco: Never Used   Substance Use Topics    Alcohol use: Yes     Comment: occassional    Drug use: No     Types: Prescription, OTC     [phone] (home)   Past Surgical History:   Procedure Laterality Date    HX ACL RECONSTRUCTION Left 2014    HX CARPAL TUNNEL RELEASE Left 2009    HX CATARACT REMOVAL Right 2/2013    intraocular lens implant    HX COLONOSCOPY  12/2014    dr Eva Walker    [facility] [address] Left     swollen tissue around knee replacement    HX HEART CATHETERIZATION  12/9/2015    HX KNEE REPLACEMENT Left 2004    HX SMALL BOWEL RESECTION  12/2014 and 1/2015    HX ROSALIA AND BSO  1977    HX ROSALIA AND BSO  1977    HX THYROIDECTOMY  08/24/2016    HX TONSILLECTOMY      Age 32    HX TOTAL COLECTOMY      HX VITRECTOMY Right          ROS:    Gen.: no fever or significant change in appetite or weight    Pul: no sob  GI: Rarely takes a turn for dyspepsia but no acid reducers otherwise      OBJ;    Visit Vitals  /76   Pulse 84   Resp 18   Ht 5' 4\" (1.626 m)   Wt 133 lb 6.4 oz (60.5 kg)   BMI 22.90 kg/m²     Gen:move ok wtih cane  EENT:no icterus  Pul: clear    MSK: no spinal tenderness      ASSESSMENT:        Osteoporosis 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

## 2023-05-16 DIAGNOSIS — D47.2 MGUS (MONOCLONAL GAMMOPATHY OF UNKNOWN SIGNIFICANCE): Primary | ICD-10-CM

## 2023-05-16 ASSESSMENT — ENCOUNTER SYMPTOMS
SCLERAL ICTERUS: 0
VOMITING: 0
ABDOMINAL PAIN: 0
CHEST TIGHTNESS: 0
NAUSEA: 0
TROUBLE SWALLOWING: 0
VOICE CHANGE: 0
HEMOPTYSIS: 0
SHORTNESS OF BREATH: 0
BLOOD IN STOOL: 0
WHEEZING: 0
ABDOMINAL DISTENTION: 0
SORE THROAT: 0
DIARRHEA: 0
CONSTIPATION: 0

## 2023-05-16 NOTE — PROGRESS NOTES
[facility]: Established patient - follow up     Chief Complaint   Patient presents with    Follow-up     Chronological Events:  Reason for Referral: Monoclonal gammopathy   Referring Provider: Adriane Blum MD   Primary Care Provider: Prakash Villegas MD   Family History of Cancer/Hematologic Disorders: Family history is significant for sister with breast cancer at the age of 80. Presenting Symptoms: No relevant physical symptoms reported. History of Present Illness:  Ms. Rolando Arellano is a 80 y.o. female who presents today for follow up regarding MGUS. The past medical history is significant for vitamin D deficiency, PAF on AC, osteopenia, osteoarthritis,  occlusion and stenosis of carotid artery, HLD, mitral valve disorder, malignant neoplasm of rectosigmoid junction, IBS, HTN,  ovarian cancer, colon polyps, Graves disease, GERD, diverticulosis, bilateral carotid artery stenosis, aortic regurgitation, anxiety disorder, total colectomy, tonsillectomy, thyroidectomy, ROSALIA/BSO,  small bowel resection, heart catheterization, cyst I&D, cataract removal, vitrectomy, and orthopedic surgery. She was evaluated in consultation by Rheumatologist, Dr. Trip Bernstein, on 11/4/21 after being referred by her PCP for evaluation of osteoporosis. Review of systems and physical exam were unremarkable. She was treated with Prolia with her first injection on 11/4/21 with plans for 60 mg Prolia injection every 6 months. 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 Ms. Rolando Arellano was subsequently referred to [facility], per Rheumatology, for hematology evaluation and treatment of  monoclonal gammopathy.      We had a lengthy discussion regarding the pathophysiology of plasma cell disorders going over the spectrum of plasma cell diseases utilizing visual aids on the

## 2023-05-17 ENCOUNTER — OFFICE VISIT (OUTPATIENT)
Dept: ONCOLOGY | Age: 88
End: 2023-05-17

## 2023-05-17 VITALS
OXYGEN SATURATION: 95 % | HEIGHT: 65 IN | SYSTOLIC BLOOD PRESSURE: 140 MMHG | TEMPERATURE: 98 F | WEIGHT: 132 LBS | BODY MASS INDEX: 21.99 KG/M2 | DIASTOLIC BLOOD PRESSURE: 68 MMHG | HEART RATE: 85 BPM | RESPIRATION RATE: 16 BRPM

## 2023-05-17 DIAGNOSIS — M54.32 SCIATICA WITHOUT BACK PAIN, LEFT: ICD-10-CM

## 2023-05-17 DIAGNOSIS — R22.30 NODULE OF SKIN OF HAND: ICD-10-CM

## 2023-05-17 DIAGNOSIS — D47.2 MGUS (MONOCLONAL GAMMOPATHY OF UNKNOWN SIGNIFICANCE): Primary | ICD-10-CM

## 2023-05-17 PROBLEM — Z79.52 CURRENT USE OF STEROID MEDICATION: Status: ACTIVE | Noted: 2023-05-17

## 2023-05-17 RX ORDER — PANTOPRAZOLE SODIUM 20 MG/1
20 TABLET, DELAYED RELEASE ORAL
Qty: 30 TABLET | Refills: 0 | Status: SHIPPED | OUTPATIENT
Start: 2023-05-17

## 2023-05-17 RX ORDER — PREDNISONE 20 MG/1
TABLET ORAL
Qty: 10 TABLET | Refills: 0 | Status: SHIPPED | OUTPATIENT
Start: 2023-05-17 | End: 2023-05-25

## 2023-05-17 ASSESSMENT — PATIENT HEALTH QUESTIONNAIRE - PHQ9
SUM OF ALL RESPONSES TO PHQ QUESTIONS 1-9: 0
2. FEELING DOWN, DEPRESSED OR HOPELESS: 0
1. LITTLE INTEREST OR PLEASURE IN DOING THINGS: 0
SUM OF ALL RESPONSES TO PHQ9 QUESTIONS 1 & 2: 0
SUM OF ALL RESPONSES TO PHQ QUESTIONS 1-9: 0

## 2023-05-19 DIAGNOSIS — D47.2 MONOCLONAL GAMMOPATHY: ICD-10-CM

## 2023-05-19 DIAGNOSIS — R77.9 ABNORMALITY OF PLASMA PROTEIN, UNSPECIFIED: ICD-10-CM

## 2023-05-19 DIAGNOSIS — D47.2 MGUS (MONOCLONAL GAMMOPATHY OF UNKNOWN SIGNIFICANCE): Primary | ICD-10-CM

## 2023-05-24 ENCOUNTER — TELEPHONE (OUTPATIENT)
Dept: INTERNAL MEDICINE CLINIC | Facility: CLINIC | Age: 88
End: 2023-05-24

## 2023-05-24 NOTE — TELEPHONE ENCOUNTER
Admission medical exam form from [facility]. Placed on PCP desk in yellow folder.

## 2023-05-25 ENCOUNTER — OFFICE VISIT (OUTPATIENT)
Dept: INTERNAL MEDICINE CLINIC | Facility: CLINIC | Age: 88
End: 2023-05-25
Payer: MEDICARE

## 2023-05-25 VITALS
SYSTOLIC BLOOD PRESSURE: 135 MMHG | DIASTOLIC BLOOD PRESSURE: 68 MMHG | HEART RATE: 62 BPM | OXYGEN SATURATION: 96 % | BODY MASS INDEX: 21.96 KG/M2 | WEIGHT: 131.8 LBS | TEMPERATURE: 97.6 F | HEIGHT: 65 IN

## 2023-05-25 DIAGNOSIS — E03.9 PRIMARY HYPOTHYROIDISM: Primary | ICD-10-CM

## 2023-05-25 DIAGNOSIS — E05.00 GRAVES' DISEASE: ICD-10-CM

## 2023-05-25 DIAGNOSIS — E03.9 PRIMARY HYPOTHYROIDISM: ICD-10-CM

## 2023-05-25 DIAGNOSIS — I10 PRIMARY HYPERTENSION: ICD-10-CM

## 2023-05-25 DIAGNOSIS — Z02.2 ENCOUNTER FOR EXAMINATION FOR ADMISSION TO ASSISTED LIVING FACILITY: ICD-10-CM

## 2023-05-25 DIAGNOSIS — D47.2 MGUS (MONOCLONAL GAMMOPATHY OF UNKNOWN SIGNIFICANCE): ICD-10-CM

## 2023-05-25 PROBLEM — C90.00 MULTIPLE MYELOMA NOT HAVING ACHIEVED REMISSION (HCC): Status: ACTIVE | Noted: 2022-12-15

## 2023-05-25 PROBLEM — M75.50 BURSITIS OF SHOULDER: Status: ACTIVE | Noted: 2020-11-17

## 2023-05-25 LAB
BASOPHILS # BLD: 0 K/UL (ref 0–0.2)
BASOPHILS NFR BLD: 0 % (ref 0–2)
DIFFERENTIAL METHOD BLD: ABNORMAL
EOSINOPHIL # BLD: 0 K/UL (ref 0–0.8)
EOSINOPHIL NFR BLD: 0 % (ref 0.5–7.8)
ERYTHROCYTE [DISTWIDTH] IN BLOOD BY AUTOMATED COUNT: 14.6 % (ref 11.9–14.6)
HCT VFR BLD AUTO: 41.2 % (ref 35.8–46.3)
HGB BLD-MCNC: 13 G/DL (ref 11.7–15.4)
IMM GRANULOCYTES # BLD AUTO: 0.1 K/UL (ref 0–0.5)
IMM GRANULOCYTES NFR BLD AUTO: 1 % (ref 0–5)
LYMPHOCYTES # BLD: 0.9 K/UL (ref 0.5–4.6)
LYMPHOCYTES NFR BLD: 8 % (ref 13–44)
MCH RBC QN AUTO: 30 PG (ref 26.1–32.9)
MCHC RBC AUTO-ENTMCNC: 31.6 G/DL (ref 31.4–35)
MCV RBC AUTO: 95.2 FL (ref 82–102)
MONOCYTES # BLD: 0.4 K/UL (ref 0.1–1.3)
MONOCYTES NFR BLD: 4 % (ref 4–12)
NEUTS SEG # BLD: 9.4 K/UL (ref 1.7–8.2)
NEUTS SEG NFR BLD: 87 % (ref 43–78)
NRBC # BLD: 0 K/UL (ref 0–0.2)
PLATELET # BLD AUTO: 348 K/UL (ref 150–450)
PMV BLD AUTO: 9.4 FL (ref 9.4–12.3)
RBC # BLD AUTO: 4.33 M/UL (ref 4.05–5.2)
WBC # BLD AUTO: 10.8 K/UL (ref 4.3–11.1)

## 2023-05-25 PROCEDURE — 1036F TOBACCO NON-USER: CPT | Performed by: NURSE PRACTITIONER

## 2023-05-25 PROCEDURE — 1123F ACP DISCUSS/DSCN MKR DOCD: CPT | Performed by: NURSE PRACTITIONER

## 2023-05-25 PROCEDURE — G8427 DOCREV CUR MEDS BY ELIG CLIN: HCPCS | Performed by: NURSE PRACTITIONER

## 2023-05-25 PROCEDURE — 99215 OFFICE O/P EST HI 40 MIN: CPT | Performed by: NURSE PRACTITIONER

## 2023-05-25 PROCEDURE — G8420 CALC BMI NORM PARAMETERS: HCPCS | Performed by: NURSE PRACTITIONER

## 2023-05-25 PROCEDURE — 1090F PRES/ABSN URINE INCON ASSESS: CPT | Performed by: NURSE PRACTITIONER

## 2023-05-25 RX ORDER — INDAPAMIDE 1.25 MG/1
1.25 TABLET, FILM COATED ORAL DAILY
Qty: 90 TABLET | Refills: 3 | Status: SHIPPED | OUTPATIENT
Start: 2023-07-29

## 2023-05-25 ASSESSMENT — PATIENT HEALTH QUESTIONNAIRE - PHQ9
SUM OF ALL RESPONSES TO PHQ QUESTIONS 1-9: 0
1. LITTLE INTEREST OR PLEASURE IN DOING THINGS: 0
SUM OF ALL RESPONSES TO PHQ9 QUESTIONS 1 & 2: 0
SUM OF ALL RESPONSES TO PHQ QUESTIONS 1-9: 0
2. FEELING DOWN, DEPRESSED OR HOPELESS: 0

## 2023-05-26 DIAGNOSIS — M54.32 SCIATICA WITHOUT BACK PAIN, LEFT: Primary | ICD-10-CM

## 2023-05-26 LAB
ALBUMIN SERPL-MCNC: 3.5 G/DL (ref 3.2–4.6)
ALBUMIN/GLOB SERPL: 0.9 (ref 0.4–1.6)
ALP SERPL-CCNC: 70 U/L (ref 50–136)
ALT SERPL-CCNC: 22 U/L (ref 12–65)
ANION GAP SERPL CALC-SCNC: 10 MMOL/L (ref 2–11)
AST SERPL-CCNC: 15 U/L (ref 15–37)
BILIRUB SERPL-MCNC: 0.4 MG/DL (ref 0.2–1.1)
BUN SERPL-MCNC: 25 MG/DL (ref 8–23)
CALCIUM SERPL-MCNC: 10.6 MG/DL (ref 8.3–10.4)
CHLORIDE SERPL-SCNC: 100 MMOL/L (ref 101–110)
CO2 SERPL-SCNC: 25 MMOL/L (ref 21–32)
CREAT SERPL-MCNC: 0.8 MG/DL (ref 0.6–1)
GLOBULIN SER CALC-MCNC: 3.8 G/DL (ref 2.8–4.5)
GLUCOSE SERPL-MCNC: 94 MG/DL (ref 65–100)
KAPPA LC FREE SER-MCNC: 566.1 MG/L (ref 3.3–19.4)
KAPPA LC FREE/LAMBDA FREE SER: 69.89 (ref 0.26–1.65)
LAMBDA LC FREE SERPL-MCNC: 8.1 MG/L (ref 5.7–26.3)
POTASSIUM SERPL-SCNC: 4.5 MMOL/L (ref 3.5–5.1)
PROT SERPL-MCNC: 7.3 G/DL (ref 6.3–8.2)
SODIUM SERPL-SCNC: 135 MMOL/L (ref 133–143)
TSH, 3RD GENERATION: 2.25 UIU/ML (ref 0.36–3.74)

## 2023-05-26 RX ORDER — PANTOPRAZOLE SODIUM 20 MG/1
20 TABLET, DELAYED RELEASE ORAL
Qty: 30 TABLET | Refills: 1 | Status: SHIPPED | OUTPATIENT
Start: 2023-05-26

## 2023-05-26 RX ORDER — PREDNISONE 20 MG/1
20 TABLET ORAL DAILY
Qty: 30 TABLET | Refills: 1 | Status: SHIPPED | OUTPATIENT
Start: 2023-05-26 | End: 2023-07-25

## 2023-05-30 LAB
ALBUMIN SERPL ELPH-MCNC: 3.5 G/DL (ref 2.9–4.4)
ALBUMIN/GLOB SERPL: 1.1 (ref 0.7–1.7)
ALPHA1 GLOB SERPL ELPH-MCNC: 0.2 G/DL (ref 0–0.4)
ALPHA2 GLOB SERPL ELPH-MCNC: 0.8 G/DL (ref 0.4–1)
B-GLOBULIN SERPL ELPH-MCNC: 1 G/DL (ref 0.7–1.3)
GAMMA GLOB SERPL ELPH-MCNC: 1.2 G/DL (ref 0.4–1.8)
GLOBULIN SER-MCNC: 3.3 G/DL (ref 2.2–3.9)
IGA SERPL-MCNC: 64 MG/DL (ref 64–422)
IGG SERPL-MCNC: 1350 MG/DL (ref 586–1602)
IGM SERPL-MCNC: 26 MG/DL (ref 26–217)
INTERPRETATION SERPL IEP-IMP: ABNORMAL
M PROTEIN SERPL ELPH-MCNC: 0.9 G/DL
PROT SERPL-MCNC: 6.8 G/DL (ref 6–8.5)

## 2023-05-31 LAB
ALBUMIN MFR UR ELPH: 6.7 %
ALPHA1 GLOB MFR UR ELPH: 3.6 %
ALPHA2 GLOB 24H MFR UR ELPH: 7.7 %
B-GLOBULIN 24H MFR UR ELPH: 15.5 %
GAMMA GLOB 24H MFR UR ELPH: 66.5 %
LABORATORY COMMENT REPORT: ABNORMAL
M PROTEIN MFR UR ELPH: 57.4 %
PROT UR-MCNC: 12.3 MG/DL

## 2023-06-08 ASSESSMENT — ENCOUNTER SYMPTOMS
BACK PAIN: 1
SHORTNESS OF BREATH: 0
WHEEZING: 0
ABDOMINAL PAIN: 0
CHEST TIGHTNESS: 0

## 2023-06-13 ENCOUNTER — TELEPHONE (OUTPATIENT)
Dept: ONCOLOGY | Age: 88
End: 2023-06-13

## 2023-06-13 NOTE — TELEPHONE ENCOUNTER
Pt state she read side effect for medication Prednisone & pt state she think it is affected her legs & can barely walk. Pt want to know if she can cut prednisone in 1/2.

## 2023-06-13 NOTE — TELEPHONE ENCOUNTER
After chart review,  Per Dr. Navarrete Pahala note on 5/17, pt has been experiencing sciatica-like pain. RX for prednisone was sent with instruction to taper - pt to start at 40mg and taper by 10mg every 2 days. Returned call to pt - states that she has currently been taking 20mg daily x3wks. States a new rx was sent in since OV w/o instruction to taper. She currently has 4 tabs left. States she has had difficulty sleeping and that her legs were weak and achy. Inquired about whether this was impacting her ability to walk - she states \"no, but I just don't want to because my legs hurt\". Pt inquiring about whether she can reduce to 10mg daily for the remaining days and then d/c the prednisone. States she no longer wants to be on the prednisone. Msg routed to Nps for instruction on taper.

## 2023-06-25 DIAGNOSIS — M54.32 SCIATICA WITHOUT BACK PAIN, LEFT: ICD-10-CM

## 2023-06-26 ENCOUNTER — TELEPHONE (OUTPATIENT)
Dept: ONCOLOGY | Age: 88
End: 2023-06-26

## 2023-06-26 DIAGNOSIS — M54.32 SCIATICA WITHOUT BACK PAIN, LEFT: ICD-10-CM

## 2023-06-27 DIAGNOSIS — M54.32 SCIATICA WITHOUT BACK PAIN, LEFT: ICD-10-CM

## 2023-06-27 RX ORDER — PREDNISONE 20 MG/1
20 TABLET ORAL DAILY
Qty: 30 TABLET | Refills: 1 | Status: SHIPPED | OUTPATIENT
Start: 2023-06-27 | End: 2023-08-26

## 2023-06-27 RX ORDER — PANTOPRAZOLE SODIUM 20 MG/1
20 TABLET, DELAYED RELEASE ORAL
Qty: 30 TABLET | Refills: 1 | Status: SHIPPED | OUTPATIENT
Start: 2023-06-27

## 2023-06-27 RX ORDER — SULFAMETHOXAZOLE AND TRIMETHOPRIM 400; 80 MG/1; MG/1
TABLET ORAL
Qty: 12 TABLET | Refills: 1 | Status: SHIPPED | OUTPATIENT
Start: 2023-06-27

## 2023-07-03 ENCOUNTER — TELEPHONE (OUTPATIENT)
Dept: NEUROSURGERY | Age: 88
End: 2023-07-03

## 2023-07-03 ENCOUNTER — TELEPHONE (OUTPATIENT)
Age: 88
End: 2023-07-03

## 2023-07-03 NOTE — TELEPHONE ENCOUNTER
Pt calls as she has terrible sciatica. Pt is concerned that the medications that Dr. Luis Mcclain prescribed could be interacting with her heart meds. Pt is also calling Dr. Vik La office to see if any of the following symptoms are side-effects for the 3 meds prescribed:    HR constantly in the 90's unless sitting still. Any activity causes it to elevate. Pt has constant gas and burping. Please advise what should be done. Best number to call is [phone].

## 2023-07-03 NOTE — TELEPHONE ENCOUNTER
----- Message from Mikayla Doe MD sent at 7/3/2023 12:10 PM EDT -----  Charissa,    I have reviewed the patient's referral and feel the patient needs to be seen as  \"next available. \"     Oseas Willingham. Meenu Tess, [address] [place] [address] Road       ----- Message -----  From: Mario Dockery  Sent: 6/30/2023  12:31 PM EDT  To: Mikayla Doe MD    Referral from next door mri in [facility] one schd for 7/24 one completed in April. Christy Hugo -pacs

## 2023-07-03 NOTE — TELEPHONE ENCOUNTER
Pt asking if she should take the pantoprazole and bactrim prescribed by Dr. Og Barry. Concerned that HR elevates to the 90s with normal exertion. Explained neither should cause any problems from a cardiac point of view, however her prednisone rx may well be causing tachycardia. Recommend contacting Dr. Og Barry. Suggest chiropractor or PT to address sciatic pain. Encouraged adequate hydration to prevent dehydration. Verb understanding.

## 2023-07-04 ENCOUNTER — HOSPITAL ENCOUNTER (EMERGENCY)
Age: 88
Discharge: HOME | End: 2023-07-04
Attending: STUDENT IN AN ORGANIZED HEALTH CARE EDUCATION/TRAINING PROGRAM
Payer: MEDICARE

## 2023-07-04 ENCOUNTER — APPOINTMENT (OUTPATIENT)
Dept: GENERAL RADIOLOGY | Age: 88
End: 2023-07-04
Payer: MEDICARE

## 2023-07-04 VITALS
TEMPERATURE: 97.6 F | OXYGEN SATURATION: 94 % | SYSTOLIC BLOOD PRESSURE: 127 MMHG | HEART RATE: 85 BPM | RESPIRATION RATE: 17 BRPM | DIASTOLIC BLOOD PRESSURE: 85 MMHG | HEIGHT: 65 IN | WEIGHT: 133.4 LBS | BODY MASS INDEX: 22.23 KG/M2

## 2023-07-04 DIAGNOSIS — I48.20 CHRONIC ATRIAL FIBRILLATION (HCC): Primary | ICD-10-CM

## 2023-07-04 DIAGNOSIS — R77.8 ELEVATED TROPONIN LEVEL: ICD-10-CM

## 2023-07-04 LAB
ALBUMIN SERPL-MCNC: 3.9 G/DL (ref 3.2–4.6)
ALBUMIN/GLOB SERPL: 1.3 (ref 0.4–1.6)
ALP SERPL-CCNC: 72 U/L (ref 45–117)
ALT SERPL-CCNC: 21 U/L (ref 13–61)
ANION GAP SERPL CALC-SCNC: 12 MMOL/L (ref 2–11)
AST SERPL-CCNC: 19 U/L (ref 15–37)
BASOPHILS # BLD: 0 K/UL (ref 0–0.2)
BASOPHILS NFR BLD: 0 % (ref 0–2)
BILIRUB SERPL-MCNC: 0.5 MG/DL (ref 0.2–1.1)
BUN SERPL-MCNC: 25 MG/DL (ref 8–23)
CALCIUM SERPL-MCNC: 10.7 MG/DL (ref 8.3–10.4)
CHLORIDE SERPL-SCNC: 98 MMOL/L (ref 98–107)
CO2 SERPL-SCNC: 24 MMOL/L (ref 21–32)
CREAT SERPL-MCNC: 0.98 MG/DL (ref 0.6–1)
DIFFERENTIAL METHOD BLD: ABNORMAL
EKG ATRIAL RATE: 208 BPM
EKG DIAGNOSIS: NORMAL
EKG Q-T INTERVAL: 353 MS
EKG QRS DURATION: 155 MS
EKG QTC CALCULATION (BAZETT): 482 MS
EKG R AXIS: -85 DEGREES
EKG T AXIS: 6 DEGREES
EKG VENTRICULAR RATE: 112 BPM
EOSINOPHIL # BLD: 0 K/UL (ref 0–0.8)
EOSINOPHIL NFR BLD: 0 % (ref 0.5–7.8)
ERYTHROCYTE [DISTWIDTH] IN BLOOD BY AUTOMATED COUNT: 14.6 % (ref 11.9–14.6)
GLOBULIN SER CALC-MCNC: 2.9 G/DL (ref 2.8–4.5)
GLUCOSE SERPL-MCNC: 113 MG/DL (ref 65–100)
HCT VFR BLD AUTO: 40.4 % (ref 35.8–46.3)
HGB BLD-MCNC: 13.6 G/DL (ref 11.7–15.4)
IMM GRANULOCYTES # BLD AUTO: 0.1 K/UL (ref 0–0.5)
IMM GRANULOCYTES NFR BLD AUTO: 2 % (ref 0–5)
LYMPHOCYTES # BLD: 0.6 K/UL (ref 0.5–4.6)
LYMPHOCYTES NFR BLD: 10 % (ref 13–44)
MAGNESIUM SERPL-MCNC: 1.6 MG/DL (ref 1.2–2.6)
MCH RBC QN AUTO: 30 PG (ref 26.1–32.9)
MCHC RBC AUTO-ENTMCNC: 33.7 G/DL (ref 31.4–35)
MCV RBC AUTO: 89.2 FL (ref 82–102)
MONOCYTES # BLD: 0.3 K/UL (ref 0.1–1.3)
MONOCYTES NFR BLD: 5 % (ref 4–12)
NEUTS SEG # BLD: 5.5 K/UL (ref 1.7–8.2)
NEUTS SEG NFR BLD: 84 % (ref 43–78)
NRBC # BLD: 0 K/UL (ref 0–0.2)
PLATELET # BLD AUTO: 323 K/UL (ref 150–450)
PMV BLD AUTO: 8.4 FL (ref 9.4–12.3)
POTASSIUM SERPL-SCNC: 4.3 MMOL/L (ref 3.5–5.1)
PROT SERPL-MCNC: 6.8 G/DL (ref 6.4–8.2)
RBC # BLD AUTO: 4.53 M/UL (ref 4.05–5.2)
SODIUM SERPL-SCNC: 134 MMOL/L (ref 133–143)
TROPONIN T SERPL HS-MCNC: 32.8 NG/L (ref 0–14)
TROPONIN T SERPL HS-MCNC: 40.7 NG/L (ref 0–14)
TSH, 3RD GENERATION: 7.12 UIU/ML (ref 0.58–3.7)
WBC # BLD AUTO: 6.6 K/UL (ref 4.3–11.1)

## 2023-07-04 PROCEDURE — 96376 TX/PRO/DX INJ SAME DRUG ADON: CPT

## 2023-07-04 PROCEDURE — 71045 X-RAY EXAM CHEST 1 VIEW: CPT

## 2023-07-04 PROCEDURE — 96374 THER/PROPH/DIAG INJ IV PUSH: CPT

## 2023-07-04 PROCEDURE — 93010 ELECTROCARDIOGRAM REPORT: CPT | Performed by: INTERNAL MEDICINE

## 2023-07-04 PROCEDURE — 2500000003 HC RX 250 WO HCPCS

## 2023-07-04 PROCEDURE — 99285 EMERGENCY DEPT VISIT HI MDM: CPT

## 2023-07-04 PROCEDURE — 84484 ASSAY OF TROPONIN QUANT: CPT

## 2023-07-04 PROCEDURE — 85025 COMPLETE CBC W/AUTO DIFF WBC: CPT

## 2023-07-04 PROCEDURE — 93005 ELECTROCARDIOGRAM TRACING: CPT

## 2023-07-04 PROCEDURE — 83735 ASSAY OF MAGNESIUM: CPT

## 2023-07-04 PROCEDURE — 84443 ASSAY THYROID STIM HORMONE: CPT

## 2023-07-04 PROCEDURE — 80053 COMPREHEN METABOLIC PANEL: CPT

## 2023-07-04 RX ORDER — DILTIAZEM HYDROCHLORIDE 5 MG/ML
10 INJECTION INTRAVENOUS
Status: COMPLETED | OUTPATIENT
Start: 2023-07-04 | End: 2023-07-04

## 2023-07-04 RX ORDER — DILTIAZEM HYDROCHLORIDE 5 MG/ML
INJECTION INTRAVENOUS
Status: COMPLETED
Start: 2023-07-04 | End: 2023-07-04

## 2023-07-04 RX ORDER — DILTIAZEM HYDROCHLORIDE 5 MG/ML
15 INJECTION INTRAVENOUS ONCE
Status: COMPLETED | OUTPATIENT
Start: 2023-07-04 | End: 2023-07-04

## 2023-07-04 RX ORDER — DILTIAZEM HYDROCHLORIDE 5 MG/ML
15 INJECTION INTRAVENOUS
Status: DISCONTINUED | OUTPATIENT
Start: 2023-07-04 | End: 2023-07-04

## 2023-07-04 RX ADMIN — DILTIAZEM HYDROCHLORIDE 15 MG: 5 INJECTION INTRAVENOUS at 15:11

## 2023-07-04 RX ADMIN — DILTIAZEM HYDROCHLORIDE 10 MG: 5 INJECTION INTRAVENOUS at 13:39

## 2023-07-04 ASSESSMENT — LIFESTYLE VARIABLES
HOW MANY STANDARD DRINKS CONTAINING ALCOHOL DO YOU HAVE ON A TYPICAL DAY: PATIENT DOES NOT DRINK
HOW OFTEN DO YOU HAVE A DRINK CONTAINING ALCOHOL: NEVER

## 2023-07-04 ASSESSMENT — ENCOUNTER SYMPTOMS
DIARRHEA: 0
NAUSEA: 0
SHORTNESS OF BREATH: 0
VOMITING: 0
COLOR CHANGE: 0
ABDOMINAL PAIN: 0

## 2023-07-04 ASSESSMENT — PAIN - FUNCTIONAL ASSESSMENT: PAIN_FUNCTIONAL_ASSESSMENT: NONE - DENIES PAIN

## 2023-07-04 NOTE — DISCHARGE INSTRUCTIONS
Evaluated in the emergency department today for elevated heart rate    You were shown to have elevated heart rate today. We gave you 2 doses of diltiazem to slow your heart rate today. The second dose seem to work. You have mildly elevated troponin however this is likely due to your 1 week of tachycardia. Dr. Rebekah Pedersen (the ER doctor) spoke with Dr. Myah Rodriguez regarding medication management. If you are noting elevated heart rate as you had been this previous week, you can take a 25 mg additional dose of your metoprolol which is half of a tablet of your regular medication. Recommend you do only 1 extra 25 mg dose per day. Contact your cardiologist office tomorrow to let them know that you were seen here in the emergency department. Would like you to have follow-up within the next week.     Contact the doctor that you are currently using as your primary care from heme-onc for a follow-up within the next week    Return to the emergency department if you experience chest pain, shortness of breath, dizziness or lightheadedness, passing out, signs and symptoms of stroke as listed in your discharge paperwork

## 2023-07-04 NOTE — ED PROVIDER NOTES
CONTINUE these medications which have NOT CHANGED    Details   predniSONE (DELTASONE) 20 MG tablet Take 1 tablet by mouth daily, Disp-30 tablet, R-1Normal      pantoprazole (PROTONIX) 20 MG tablet Take 1 tablet by mouth every morning (before breakfast), Disp-30 tablet, R-1Normal      sulfamethoxazole-trimethoprim (BACTRIM) 400-80 MG per tablet Take one tablet on Mondays, Wednesdays, and Fridays, Disp-12 tablet, R-1Normal      indapamide (LOZOL) 1.25 MG tablet Take 1 tablet by mouth daily, Disp-90 tablet, R-3Normal      denosumab (PROLIA) 60 MG/ML SOSY SC injection Inject 1 mL into the skin onceHistorical Med      apixaban (ELIQUIS) 2.5 MG TABS tablet Take 1 tablet by mouth 2 times daily Take 1/2 tablet BID, Disp-180 tablet, R-3Normal      bimatoprost (LUMIGAN) 0.01 % SOLN ophthalmic drops Place 1 drop into both eyes nightlyHistorical Med      SYNTHROID 75 MCG tablet Take 1 tablet by mouth Daily, Disp-90 tablet, R-3, DAWNormal      acetaminophen (TYLENOL) 325 MG tablet Take 2 tablets by mouth every 6 hours as needed for PainHistorical Med      metoprolol succinate (TOPROL XL) 50 MG extended release tablet Take 1 tablet by mouth in the morning., Disp-90 tablet, R-3Normal      b complex vitamins capsule Take 1 capsule by mouth dailyHistorical Med      ibuprofen (ADVIL;MOTRIN) 200 MG tablet Take 0.5 tablets by mouth every 6 hours as needed for PainHistorical Med      Lifitegrast (XIIDRA) 5 % SOLN Place 1 drop into both eyes in the morning and at bedtimeHistorical Med      Brimonidine Tartrate (LUMIFY) 0.025 % SOLN Place 1 drop into both eyes as neededHistorical Med      coenzyme Q10 100 MG CAPS capsule Take 1 capsule by mouth dailyHistorical Med      conjugated estrogens (PREMARIN) 0.625 MG/GM vaginal cream Place 0.5 g vaginally every 7 days, Vaginal, EVERY 7 DAYS, Historical Med      mometasone (ELOCON) 0.1 % lotion APPLY TO SCALP TWICE DAILY FOR ITCH AS NEEDED, Historical Med              Results for orders placed

## 2023-07-04 NOTE — ED TRIAGE NOTES
Pt heart rate has been running in high 90s to low 100s for past week with increased reflux and abdominal gas per pt. Pt is having new stressor at home with trying to move to nursing home. Pt does have history of afib.

## 2023-07-05 ENCOUNTER — TELEPHONE (OUTPATIENT)
Age: 88
End: 2023-07-05

## 2023-07-05 NOTE — TELEPHONE ENCOUNTER
[facility] with tachy heart beat Told to see Dr Kiara HWANG  Please call

## 2023-07-05 NOTE — TELEPHONE ENCOUNTER
MD Diana Anaya RN  Caller: Unspecified (Today,  8:16 AM)  SEe me at 1145 on Friday. A cancellation is there just put her in           Spoke to pt's son, Zen Packer, made aware of plan. Appt scheduled Friday, 11:45 in [place] office. Verb understanding.

## 2023-07-05 NOTE — TELEPHONE ENCOUNTER
Pt is going through a lot of stress right now due to moving to a half-way community. Is also on prednisone 20 mg daily for chronic sciatica. Was in ER yesterday for afib, rated 120s-130s. TSH in ER 7.12, Troponin 40.7, then 32.8--ER attributes to afib/tachycardia for past week. Rate controlled with diltiazem 15 mg IV in ER. Sent home on usual metoprolol dose of 50 mg daily with additional 25 mg tab prn HR >115. Please advise further recommendations.

## 2023-07-06 ASSESSMENT — ENCOUNTER SYMPTOMS: SHORTNESS OF BREATH: 0

## 2023-07-07 ENCOUNTER — OFFICE VISIT (OUTPATIENT)
Age: 88
End: 2023-07-07
Payer: MEDICARE

## 2023-07-07 VITALS
HEIGHT: 65 IN | BODY MASS INDEX: 21.16 KG/M2 | SYSTOLIC BLOOD PRESSURE: 136 MMHG | DIASTOLIC BLOOD PRESSURE: 82 MMHG | HEART RATE: 117 BPM | WEIGHT: 127 LBS

## 2023-07-07 DIAGNOSIS — I48.0 PAF (PAROXYSMAL ATRIAL FIBRILLATION) (HCC): Primary | Chronic | ICD-10-CM

## 2023-07-07 DIAGNOSIS — I45.2 RBBB (RIGHT BUNDLE BRANCH BLOCK WITH LEFT ANTERIOR FASCICULAR BLOCK): ICD-10-CM

## 2023-07-07 DIAGNOSIS — I10 PRIMARY HYPERTENSION: Chronic | ICD-10-CM

## 2023-07-07 PROCEDURE — G8420 CALC BMI NORM PARAMETERS: HCPCS | Performed by: INTERNAL MEDICINE

## 2023-07-07 PROCEDURE — 93000 ELECTROCARDIOGRAM COMPLETE: CPT | Performed by: INTERNAL MEDICINE

## 2023-07-07 PROCEDURE — 1036F TOBACCO NON-USER: CPT | Performed by: INTERNAL MEDICINE

## 2023-07-07 PROCEDURE — 99214 OFFICE O/P EST MOD 30 MIN: CPT | Performed by: INTERNAL MEDICINE

## 2023-07-07 PROCEDURE — G8427 DOCREV CUR MEDS BY ELIG CLIN: HCPCS | Performed by: INTERNAL MEDICINE

## 2023-07-07 PROCEDURE — 1123F ACP DISCUSS/DSCN MKR DOCD: CPT | Performed by: INTERNAL MEDICINE

## 2023-07-07 PROCEDURE — 1090F PRES/ABSN URINE INCON ASSESS: CPT | Performed by: INTERNAL MEDICINE

## 2023-07-07 NOTE — PROGRESS NOTES
[address], [address] [facility]  PHONE: [phone]    Jose Ivey  3/7/1934      SUBJECTIVE:   Jose Ivey is a 80 y.o. female seen for a follow up visit regarding the following:     Chief Complaint   Patient presents with    Follow-Up from Hospital     Tachycardia       HPI:    Patient presents for acute care visit. She was recently seen in the emergency room on July 4 with atrial fibrillation with tachycardia. Apparently she had a 1 week history of elevated heart rates and being fatigued. Upon presentation heart rate was 110-130. She was given intravenous Cardizem with improvement of her rhythm. Discussion with on-call cardiology were undertaken and she was discharged home on \"an extra as needed dose of metoprolol 25 mg\". 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 She has a known right bundle branch block. Patient states that she feels weak. No palpitations or tachycardia. Less endurance with atrial fibrillation. Past Medical History, Past Surgical History, Family history, Social History, and Medications were all reviewed with the patient today and updated as necessary.            Current Outpatient Medications:     predniSONE (DELTASONE) 20 MG tablet, Take 1 tablet by mouth daily, Disp: 30 tablet, Rfl: 1    pantoprazole (PROTONIX) 20 MG tablet, Take 1 tablet by mouth every morning (before breakfast), Disp: 30 tablet, Rfl: 1    sulfamethoxazole-trimethoprim (BACTRIM) 400-80 MG per tablet, Take one tablet on Mondays, Wednesdays, and Fridays, Disp: 12 tablet, Rfl: 1    [START ON 7/29/2023] indapamide (LOZOL) 1.25 MG tablet, Take 1 tablet by mouth daily, Disp: 90 tablet, Rfl: 3    denosumab (PROLIA) 60 MG/ML SOSY SC injection, Inject 1 mL into the skin once yearly, Disp: , Rfl:     apixaban (ELIQUIS) 2.5 MG TABS

## 2023-07-07 NOTE — PATIENT INSTRUCTIONS
As we discussed in the office: You are back in atrial fibrillation. We need to do a cardioversion which I will arrange on Tuesday. I need you to take 1 tablet of your Eliquis twice a day around the time of this cardioversion to avoid the risk of stroke. Please take your Eliquis the morning of the procedure.

## 2023-07-10 DIAGNOSIS — I48.0 PAF (PAROXYSMAL ATRIAL FIBRILLATION) (HCC): Chronic | ICD-10-CM

## 2023-07-10 LAB
ANION GAP SERPL CALC-SCNC: 6 MMOL/L (ref 2–11)
BUN SERPL-MCNC: 19 MG/DL (ref 8–23)
CALCIUM SERPL-MCNC: 9.6 MG/DL (ref 8.3–10.4)
CHLORIDE SERPL-SCNC: 102 MMOL/L (ref 101–110)
CO2 SERPL-SCNC: 27 MMOL/L (ref 21–32)
CREAT SERPL-MCNC: 1 MG/DL (ref 0.6–1)
ERYTHROCYTE [DISTWIDTH] IN BLOOD BY AUTOMATED COUNT: 15.4 % (ref 11.9–14.6)
GLUCOSE SERPL-MCNC: 88 MG/DL (ref 65–100)
HCT VFR BLD AUTO: 41.6 % (ref 35.8–46.3)
HGB BLD-MCNC: 13.1 G/DL (ref 11.7–15.4)
MCH RBC QN AUTO: 29.9 PG (ref 26.1–32.9)
MCHC RBC AUTO-ENTMCNC: 31.5 G/DL (ref 31.4–35)
MCV RBC AUTO: 95 FL (ref 82–102)
NRBC # BLD: 0 K/UL (ref 0–0.2)
PLATELET # BLD AUTO: 333 K/UL (ref 150–450)
PMV BLD AUTO: 9.4 FL (ref 9.4–12.3)
POTASSIUM SERPL-SCNC: 4.1 MMOL/L (ref 3.5–5.1)
RBC # BLD AUTO: 4.38 M/UL (ref 4.05–5.2)
SODIUM SERPL-SCNC: 135 MMOL/L (ref 133–143)
WBC # BLD AUTO: 7.5 K/UL (ref 4.3–11.1)

## 2023-07-10 NOTE — PROGRESS NOTES
Patient pre-assessment complete for CLARENCE/ CVN scheduled for Dr Johnathon Bermudez, arrival time 46. Patient verified using . NPO status reinforced. Instructed they can take all other medications excluding vitamins & supplements. Patient verbalizes understanding of all instructions & denies any questions at this time.

## 2023-07-11 ENCOUNTER — HOSPITAL ENCOUNTER (OUTPATIENT)
Dept: CARDIAC CATH/INVASIVE PROCEDURES | Age: 88
Discharge: HOME | End: 2023-07-11
Attending: INTERNAL MEDICINE | Admitting: INTERNAL MEDICINE
Payer: MEDICARE

## 2023-07-11 VITALS
WEIGHT: 127 LBS | OXYGEN SATURATION: 99 % | BODY MASS INDEX: 21.16 KG/M2 | TEMPERATURE: 97.8 F | DIASTOLIC BLOOD PRESSURE: 99 MMHG | SYSTOLIC BLOOD PRESSURE: 111 MMHG | HEIGHT: 65 IN | HEART RATE: 62 BPM

## 2023-07-11 DIAGNOSIS — I48.0 PAF (PAROXYSMAL ATRIAL FIBRILLATION) (HCC): Chronic | ICD-10-CM

## 2023-07-11 LAB
ECHO BSA: 1.63 M2
EKG ATRIAL RATE: 110 BPM
EKG ATRIAL RATE: 61 BPM
EKG DIAGNOSIS: NORMAL
EKG DIAGNOSIS: NORMAL
EKG P AXIS: 88 DEGREES
EKG P-R INTERVAL: 186 MS
EKG Q-T INTERVAL: 380 MS
EKG Q-T INTERVAL: 434 MS
EKG QRS DURATION: 138 MS
EKG QRS DURATION: 150 MS
EKG QTC CALCULATION (BAZETT): 436 MS
EKG QTC CALCULATION (BAZETT): 507 MS
EKG R AXIS: -14 DEGREES
EKG R AXIS: 43 DEGREES
EKG T AXIS: -37 DEGREES
EKG T AXIS: -48 DEGREES
EKG VENTRICULAR RATE: 107 BPM
EKG VENTRICULAR RATE: 61 BPM

## 2023-07-11 PROCEDURE — 92960 CARDIOVERSION ELECTRIC EXT: CPT

## 2023-07-11 PROCEDURE — 99152 MOD SED SAME PHYS/QHP 5/>YRS: CPT | Performed by: INTERNAL MEDICINE

## 2023-07-11 PROCEDURE — 6360000002 HC RX W HCPCS: Performed by: INTERNAL MEDICINE

## 2023-07-11 PROCEDURE — 93312 ECHO TRANSESOPHAGEAL: CPT | Performed by: INTERNAL MEDICINE

## 2023-07-11 PROCEDURE — 93005 ELECTROCARDIOGRAM TRACING: CPT | Performed by: INTERNAL MEDICINE

## 2023-07-11 PROCEDURE — 93010 ELECTROCARDIOGRAM REPORT: CPT | Performed by: INTERNAL MEDICINE

## 2023-07-11 PROCEDURE — 6370000000 HC RX 637 (ALT 250 FOR IP): Performed by: INTERNAL MEDICINE

## 2023-07-11 PROCEDURE — 99152 MOD SED SAME PHYS/QHP 5/>YRS: CPT

## 2023-07-11 PROCEDURE — 93320 DOPPLER ECHO COMPLETE: CPT | Performed by: INTERNAL MEDICINE

## 2023-07-11 PROCEDURE — 92960 CARDIOVERSION ELECTRIC EXT: CPT | Performed by: INTERNAL MEDICINE

## 2023-07-11 PROCEDURE — 93325 DOPPLER ECHO COLOR FLOW MAPG: CPT | Performed by: INTERNAL MEDICINE

## 2023-07-11 RX ORDER — SODIUM CHLORIDE 9 MG/ML
INJECTION, SOLUTION INTRAVENOUS CONTINUOUS
Status: DISCONTINUED | OUTPATIENT
Start: 2023-07-11 | End: 2023-07-11 | Stop reason: HOSPADM

## 2023-07-11 RX ORDER — LIDOCAINE HYDROCHLORIDE 20 MG/ML
SOLUTION OROPHARYNGEAL PRN
Status: COMPLETED | OUTPATIENT
Start: 2023-07-11 | End: 2023-07-11

## 2023-07-11 RX ORDER — FENTANYL CITRATE 50 UG/ML
INJECTION, SOLUTION INTRAMUSCULAR; INTRAVENOUS PRN
Status: COMPLETED | OUTPATIENT
Start: 2023-07-11 | End: 2023-07-11

## 2023-07-11 RX ORDER — MIDAZOLAM HYDROCHLORIDE 1 MG/ML
INJECTION INTRAMUSCULAR; INTRAVENOUS PRN
Status: COMPLETED | OUTPATIENT
Start: 2023-07-11 | End: 2023-07-11

## 2023-07-11 RX ADMIN — LIDOCAINE HYDROCHLORIDE 15 ML: 20 SOLUTION ORAL at 12:12

## 2023-07-11 RX ADMIN — MIDAZOLAM 1 MG: 1 INJECTION INTRAMUSCULAR; INTRAVENOUS at 12:17

## 2023-07-11 RX ADMIN — FENTANYL CITRATE 25 MCG: 50 INJECTION, SOLUTION INTRAMUSCULAR; INTRAVENOUS at 12:17

## 2023-07-11 RX ADMIN — MIDAZOLAM 2 MG: 1 INJECTION INTRAMUSCULAR; INTRAVENOUS at 12:16

## 2023-07-11 RX ADMIN — FENTANYL CITRATE 25 MCG: 50 INJECTION, SOLUTION INTRAMUSCULAR; INTRAVENOUS at 12:16

## 2023-07-11 NOTE — PROCEDURES
Brief Cardiac Procedure Note    Patient: Teresa Calix MRN: [id number]  SSN: [id number]    YOB: 1934  Age: 80 y.o. Sex: female      Date of Procedure: 7/11/2023     Pre-procedure Diagnosis: Atrial Fibrillation/Atrial Flutter    Post-procedure Diagnosis: atrial fibrillation    Procedure: Transesophageal Echocardiogram with Cardioversion    Brief Description of Procedure: As above    Performed By: Malissa Collins MD     Assistants: None    Anesthesia: Moderate Sedation    Estimated Blood Loss: Less than 10 mL      Specimens: None    Implants: None    Findings: No thrombus in Left atrium or appendage. DCCV. Complications: None    Recommendations: Continue medical therapy.     Signed By: Malissa Collins MD     July 11, 2023

## 2023-07-11 NOTE — PROGRESS NOTES
CLARENCE/CVN completed  Patient given 3mg versed, 50mcg fentanyl  Sedation start 1216  Sedation end 1226  CLARENCE without evidence of clot  Patient shocked x1 at 360J  Patient in NSR  Will continue to monitor patient until ready for discharge

## 2023-07-11 NOTE — PROGRESS NOTES
Patient received to [facility] room # 8  Ambulatory from [facility]. Patient scheduled for CLARENCE/CVN today with Dr Harry Duarte. Procedure reviewed & questions answered, voiced good understanding consent obtained & placed on chart. All medications and medical history reviewed. Will prep patient per orders. Patient & family updated on plan of care. The patient has a fraility score of 3-MANAGING WELL, based on patient A&Ox3, patient able to ambulate to room without difficulty.

## 2023-07-14 ENCOUNTER — TELEPHONE (OUTPATIENT)
Age: 88
End: 2023-07-14

## 2023-07-14 DIAGNOSIS — I10 PRIMARY HYPERTENSION: ICD-10-CM

## 2023-07-14 RX ORDER — METOPROLOL SUCCINATE 50 MG/1
50 TABLET, EXTENDED RELEASE ORAL DAILY
Qty: 90 TABLET | Refills: 3 | Status: SHIPPED | OUTPATIENT
Start: 2023-07-14

## 2023-07-14 NOTE — TELEPHONE ENCOUNTER
Received request for refill on metoprolol.  Confirmed with son, POA, Prescription sent to pharmacy//brendab  Requested Prescriptions     Signed Prescriptions Disp Refills    metoprolol succinate (TOPROL XL) 50 MG extended release tablet 90 tablet 3     Sig: Take 1 tablet by mouth daily     Authorizing Provider: Cristi Ferguson     Ordering User: Kenyetta Buchanan
29.9

## 2023-07-20 ENCOUNTER — HOSPITAL ENCOUNTER (EMERGENCY)
Age: 88
Discharge: HOME | End: 2023-07-20
Attending: EMERGENCY MEDICINE
Payer: MEDICARE

## 2023-07-20 ENCOUNTER — TELEPHONE (OUTPATIENT)
Age: 88
End: 2023-07-20

## 2023-07-20 ENCOUNTER — PATIENT MESSAGE (OUTPATIENT)
Age: 88
End: 2023-07-20

## 2023-07-20 VITALS
TEMPERATURE: 97.3 F | SYSTOLIC BLOOD PRESSURE: 113 MMHG | OXYGEN SATURATION: 95 % | HEART RATE: 89 BPM | DIASTOLIC BLOOD PRESSURE: 76 MMHG | RESPIRATION RATE: 20 BRPM | HEIGHT: 65 IN | BODY MASS INDEX: 21.83 KG/M2 | WEIGHT: 131 LBS

## 2023-07-20 DIAGNOSIS — I48.91 ATRIAL FIBRILLATION WITH RVR (HCC): Primary | ICD-10-CM

## 2023-07-20 LAB
ANION GAP SERPL CALC-SCNC: 8 MMOL/L (ref 2–11)
BUN SERPL-MCNC: 32 MG/DL (ref 8–23)
CALCIUM SERPL-MCNC: 9.4 MG/DL (ref 8.3–10.4)
CHLORIDE SERPL-SCNC: 105 MMOL/L (ref 101–110)
CO2 SERPL-SCNC: 23 MMOL/L (ref 21–32)
CREAT SERPL-MCNC: 1.1 MG/DL (ref 0.6–1)
EKG ATRIAL RATE: 94 BPM
EKG DIAGNOSIS: NORMAL
EKG DIAGNOSIS: NORMAL
EKG Q-T INTERVAL: 304 MS
EKG Q-T INTERVAL: 403 MS
EKG QRS DURATION: 150 MS
EKG QRS DURATION: 161 MS
EKG QTC CALCULATION (BAZETT): 429 MS
EKG QTC CALCULATION (BAZETT): 451 MS
EKG R AXIS: -68 DEGREES
EKG R AXIS: -82 DEGREES
EKG T AXIS: 17 DEGREES
EKG T AXIS: 38 DEGREES
EKG VENTRICULAR RATE: 120 BPM
EKG VENTRICULAR RATE: 75 BPM
ERYTHROCYTE [DISTWIDTH] IN BLOOD BY AUTOMATED COUNT: 16.5 % (ref 11.9–14.6)
GLUCOSE SERPL-MCNC: 137 MG/DL (ref 65–100)
HCT VFR BLD AUTO: 41.5 % (ref 35.8–46.3)
HGB BLD-MCNC: 13.7 G/DL (ref 11.7–15.4)
MAGNESIUM SERPL-MCNC: 1.8 MG/DL (ref 1.8–2.4)
MCH RBC QN AUTO: 30.6 PG (ref 26.1–32.9)
MCHC RBC AUTO-ENTMCNC: 33 G/DL (ref 31.4–35)
MCV RBC AUTO: 92.8 FL (ref 82–102)
NRBC # BLD: 0 K/UL (ref 0–0.2)
PLATELET # BLD AUTO: 255 K/UL (ref 150–450)
PMV BLD AUTO: 9 FL (ref 9.4–12.3)
POTASSIUM SERPL-SCNC: 4.1 MMOL/L (ref 3.5–5.1)
RBC # BLD AUTO: 4.47 M/UL (ref 4.05–5.2)
SODIUM SERPL-SCNC: 136 MMOL/L (ref 133–143)
TSH, 3RD GENERATION: 2.41 UIU/ML (ref 0.36–3.74)
WBC # BLD AUTO: 8.5 K/UL (ref 4.3–11.1)

## 2023-07-20 PROCEDURE — 93005 ELECTROCARDIOGRAM TRACING: CPT | Performed by: EMERGENCY MEDICINE

## 2023-07-20 PROCEDURE — 84443 ASSAY THYROID STIM HORMONE: CPT

## 2023-07-20 PROCEDURE — 96374 THER/PROPH/DIAG INJ IV PUSH: CPT

## 2023-07-20 PROCEDURE — 80048 BASIC METABOLIC PNL TOTAL CA: CPT

## 2023-07-20 PROCEDURE — 83735 ASSAY OF MAGNESIUM: CPT

## 2023-07-20 PROCEDURE — 6370000000 HC RX 637 (ALT 250 FOR IP): Performed by: EMERGENCY MEDICINE

## 2023-07-20 PROCEDURE — 99284 EMERGENCY DEPT VISIT MOD MDM: CPT

## 2023-07-20 PROCEDURE — 2580000003 HC RX 258: Performed by: EMERGENCY MEDICINE

## 2023-07-20 PROCEDURE — 93010 ELECTROCARDIOGRAM REPORT: CPT | Performed by: INTERNAL MEDICINE

## 2023-07-20 PROCEDURE — 85027 COMPLETE CBC AUTOMATED: CPT

## 2023-07-20 PROCEDURE — 2500000003 HC RX 250 WO HCPCS: Performed by: EMERGENCY MEDICINE

## 2023-07-20 RX ORDER — 0.9 % SODIUM CHLORIDE 0.9 %
500 INTRAVENOUS SOLUTION INTRAVENOUS ONCE
Status: COMPLETED | OUTPATIENT
Start: 2023-07-20 | End: 2023-07-20

## 2023-07-20 RX ORDER — DILTIAZEM HYDROCHLORIDE 120 MG/1
120 CAPSULE, COATED, EXTENDED RELEASE ORAL 2 TIMES DAILY
Qty: 60 CAPSULE | Refills: 0 | Status: SHIPPED | OUTPATIENT
Start: 2023-07-20 | End: 2023-08-19

## 2023-07-20 RX ORDER — DILTIAZEM HYDROCHLORIDE 5 MG/ML
10 INJECTION INTRAVENOUS ONCE
Status: COMPLETED | OUTPATIENT
Start: 2023-07-20 | End: 2023-07-20

## 2023-07-20 RX ORDER — DILTIAZEM HYDROCHLORIDE 120 MG/1
120 CAPSULE, COATED, EXTENDED RELEASE ORAL ONCE
Status: COMPLETED | OUTPATIENT
Start: 2023-07-20 | End: 2023-07-20

## 2023-07-20 RX ADMIN — DILTIAZEM HYDROCHLORIDE 10 MG: 5 INJECTION INTRAVENOUS at 16:22

## 2023-07-20 RX ADMIN — SODIUM CHLORIDE 500 ML: 9 INJECTION, SOLUTION INTRAVENOUS at 16:51

## 2023-07-20 RX ADMIN — DILTIAZEM HYDROCHLORIDE 120 MG: 120 CAPSULE, EXTENDED RELEASE ORAL at 19:39

## 2023-07-20 ASSESSMENT — ENCOUNTER SYMPTOMS
RHINORRHEA: 0
DIARRHEA: 0
NAUSEA: 0
SHORTNESS OF BREATH: 0
COUGH: 0
VOMITING: 0

## 2023-07-20 ASSESSMENT — PAIN - FUNCTIONAL ASSESSMENT: PAIN_FUNCTIONAL_ASSESSMENT: 0-10

## 2023-07-20 ASSESSMENT — LIFESTYLE VARIABLES
HOW OFTEN DO YOU HAVE A DRINK CONTAINING ALCOHOL: NEVER
HOW MANY STANDARD DRINKS CONTAINING ALCOHOL DO YOU HAVE ON A TYPICAL DAY: PATIENT DOES NOT DRINK

## 2023-07-20 ASSESSMENT — PAIN SCALES - GENERAL: PAINLEVEL_OUTOF10: 0

## 2023-07-20 NOTE — TELEPHONE ENCOUNTER
Pt states has been in afib since yesterday. States rate as high as the 140s off and on since yesterday. Took add'l 25 mg metoprolol last night. Daughter states pt has been >120 for most of the day. Recommend pt go to [facility] ER for eval of afib. Verb understanding.

## 2023-07-20 NOTE — ED PROVIDER NOTES
Emergency Department Provider Note       PCP: JAZMIN Ellis NP   Age: 80 y.o. Sex: female     DISPOSITION Decision To Discharge 07/20/2023 07:27:34 PM       ICD-10-CM    1. Atrial fibrillation with RVR ([address])  I48.91 [facility] Jose Angel Teresa MD, Cardiology, [facility] Decision Making     I will treat with a low-dose of Cardizem to try to rate control her. I will discuss the case with cardiology since she was just cardioverted 9 days ago to see if they want to try again. Complexity of Problems Addressed:  1 or more chronic illnesses with a severe exacerbation or progression. Data Reviewed and Analyzed:  Category 1:   I independently ordered and reviewed each unique test.  I reviewed external records: provider visit note from outside specialist.   The patients assessment required an independent historian: Son. The reason they were needed is important historical information not provided by the patient. Category 2:       Category 3: Discussion of management or test interpretation. The management of this patient was discussed with an external consultant. Risk of Complications and/or Morbidity of Patient Management:  Prescription drug management performed. Discussion with external consultants. Shared medical decision making was utilized in creating the patients health plan today. ED Course as of 07/20/23 1930   Thu Jul 20, 2023 1921 She was cardioverted on July 11 and is now back in A-fib. She is rate controlled but she is wondering if she should be cardioverted again? [AC]   [id number] I discussed the case with on-call for cardiology, Dr. Kenny Charles who suggested starting her on Cardizem CD1 120 mg twice a day. I will give her her first dose here in the emergency department. Patient says that she has a follow-up plan with Dr. Danni Waters and I will encourage her to continue that.  [AC]      ED Course User Index  [AC] Jian Beltran MD       History      Sheryl Sanchez is MG/GM VAGINAL CREAM    Place 0.5 g vaginally every 7 days    DENOSUMAB (PROLIA) 60 MG/ML SOSY SC INJECTION    Inject 1 mL into the skin once yearly    IBUPROFEN (ADVIL;MOTRIN) 200 MG TABLET    Take 0.5 tablets by mouth every 6 hours as needed for Pain    INDAPAMIDE (LOZOL) 1.25 MG TABLET    Take 1 tablet by mouth daily    LIFITEGRAST (XIIDRA) 5 % SOLN    Place 1 drop into both eyes in the morning and at bedtime    METOPROLOL SUCCINATE (TOPROL XL) 50 MG EXTENDED RELEASE TABLET    Take 1 tablet by mouth daily    MOMETASONE (ELOCON) 0.1 % LOTION    APPLY TO SCALP TWICE DAILY FOR ITCH AS NEEDED    PANTOPRAZOLE (PROTONIX) 20 MG TABLET    Take 1 tablet by mouth every morning (before breakfast)    PREDNISONE (DELTASONE) 20 MG TABLET    Take 1 tablet by mouth daily    SULFAMETHOXAZOLE-TRIMETHOPRIM (BACTRIM) 400-80 MG PER TABLET    Take one tablet on Mondays, Wednesdays, and Fridays    SYNTHROID 75 MCG TABLET    Take 1 tablet by mouth Daily        Results for orders placed or performed during the hospital encounter of 99/20/77   Basic Metabolic Panel   Result Value Ref Range    Sodium 136 133 - 143 mmol/L    Potassium 4.1 3.5 - 5.1 mmol/L    Chloride 105 101 - 110 mmol/L    CO2 23 21 - 32 mmol/L    Anion Gap 8 2 - 11 mmol/L    Glucose 137 (H) 65 - 100 mg/dL    BUN 32 (H) 8 - 23 MG/DL    Creatinine 1.10 (H) 0.6 - 1.0 MG/DL    Est, Glom Filt Rate 48 (L) >60 ml/min/1.73m2    Calcium 9.4 8.3 - 10.4 MG/DL   CBC   Result Value Ref Range    WBC 8.5 4.3 - 11.1 K/uL    RBC 4.47 4.05 - 5.2 M/uL    Hemoglobin 13.7 11.7 - 15.4 g/dL    Hematocrit 41.5 35.8 - 46.3 %    MCV 92.8 82 - 102 FL    MCH 30.6 26.1 - 32.9 PG    MCHC 33.0 31.4 - 35.0 g/dL    RDW 16.5 (H) 11.9 - 14.6 %    Platelets 197 014 - 796 K/uL    MPV 9.0 (L) 9.4 - 12.3 FL    nRBC 0.00 0.0 - 0.2 K/uL   Magnesium   Result Value Ref Range    Magnesium 1.8 1.8 - 2.4 mg/dL   TSH   Result Value Ref Range    TSH, 3RD GENERATION 2.410 0.358 - 3.740 uIU/mL   EKG 12 Lead   Result Value

## 2023-07-20 NOTE — ED NOTES
I have reviewed discharge instructions with the patient. The patient verbalized understanding. Patient left ED via Discharge Method: wheelchair to Home with family. Opportunity for questions and clarification provided.        Patient given 1 script        Sean Riley RN  07/20/23 7426

## 2023-07-20 NOTE — ED NOTES
Pt BP down to 13U systolic post Cardizem push. Pt asymptomatic. See orders.       Essie Castillo, RN  07/20/23 2821

## 2023-07-20 NOTE — ED TRIAGE NOTES
Pt reports feeling fatigued for 2 days (+)afib RVR (-)chest pain, dyspnea  Hx afib, in ED 7/11 for afib RVR and medicated for rate control.   A&Ox4

## 2023-07-20 NOTE — DISCHARGE INSTRUCTIONS
As we discussed, you should follow-up with your cardiologist for further evaluation of your A-fib. For your new diltiazem prescription start by taking it once a day for 1 week then increase to taking it twice a day. Please return with any fevers, vomiting, difficulty breathing, worsening symptoms, or additional concerns.

## 2023-07-20 NOTE — TELEPHONE ENCOUNTER
Ricardo Cross MA 7/20/2023 1:36 PM EDT      ----- Message -----  From: Dane Vaca  Sent: 7/20/2023 1:33 PM EDT  To: [facility] Clinical Staff  Subject: Possible Afib     Having trouble with network - will keep trying.

## 2023-07-20 NOTE — TELEPHONE ENCOUNTER
Patient and daughter-n-law called and said patient has been in afib since yesterday. Nicole Louise came over today and she has some sort of device that could detect this. She has sent those results through [facility]\" today.

## 2023-07-20 NOTE — ED NOTES
Per MD Eliot Zelaya hold bloodwork until pt on monitor for re-evaluation      Roxana Canales RN  07/20/23 4822

## 2023-07-21 ENCOUNTER — TELEPHONE (OUTPATIENT)
Age: 88
End: 2023-07-21

## 2023-07-21 NOTE — TELEPHONE ENCOUNTER
Daughter called and wanted to know if there was any testing that needed to be done for patient's heart that can be done at the same time that patient is to have PET scan and MRI's for Dr. Jamie Barclay. If there is, daughter would like to have these done at the same time. And have orders sent to [facility]. Daughter informed that Dr. Corinne Dueñas is out of the office until Monday, will send him a message to see if there is anything he would like to order. Daughter also informed that some times several CT's MRI's etc cannot all be done at the same time. Will call her back on Monday and let her know of doctors response. Daughter voiced understanding and thanked me Daughter states that patient will be having procedures beginning at [phone] pm//leonie      ----- Message -----  From: Salud Duran  Sent: 7/21/2023   2:42 PM EDT  To: [facility] Cardiology Clinical Staff  Subject: 2 MRIs 7/24/23; PET scan 8/1/23                  ER mentioned possibility of wanting to see results of Scheduled MRIs and PET scan orders by Dr. Jessica Chan. Please check orders of with and without contrast (spine and pelvis). Please consider any additional areas or type of tests to help with cardiology  concerns or recommendations. We are just trying to prevent the need to go through another test, if possible.     Zaira Blood[place]ia [facility]   [phone]

## 2023-07-24 ENCOUNTER — HOSPITAL ENCOUNTER (OUTPATIENT)
Dept: MRI IMAGING | Age: 88
Discharge: HOME | End: 2023-07-27
Attending: INTERNAL MEDICINE

## 2023-07-24 DIAGNOSIS — D47.2 MGUS (MONOCLONAL GAMMOPATHY OF UNKNOWN SIGNIFICANCE): ICD-10-CM

## 2023-07-24 NOTE — TELEPHONE ENCOUNTER
No procedures that would be scheduled with her tests. Not sure what procedures she is referring to. No cardiac testing planned   Daughter called with Dr Mix's response.  Daughter voiced understanding//brendab

## 2023-07-31 ENCOUNTER — TELEPHONE (OUTPATIENT)
Age: 88
End: 2023-07-31

## 2023-07-31 ENCOUNTER — OFFICE VISIT (OUTPATIENT)
Age: 88
End: 2023-07-31
Payer: MEDICARE

## 2023-07-31 VITALS
HEART RATE: 78 BPM | WEIGHT: 129 LBS | SYSTOLIC BLOOD PRESSURE: 100 MMHG | HEIGHT: 65 IN | BODY MASS INDEX: 21.49 KG/M2 | DIASTOLIC BLOOD PRESSURE: 60 MMHG

## 2023-07-31 DIAGNOSIS — I48.0 PAF (PAROXYSMAL ATRIAL FIBRILLATION) (HCC): Primary | Chronic | ICD-10-CM

## 2023-07-31 DIAGNOSIS — I45.2 RBBB (RIGHT BUNDLE BRANCH BLOCK WITH LEFT ANTERIOR FASCICULAR BLOCK): ICD-10-CM

## 2023-07-31 DIAGNOSIS — I10 PRIMARY HYPERTENSION: ICD-10-CM

## 2023-07-31 PROCEDURE — 93000 ELECTROCARDIOGRAM COMPLETE: CPT | Performed by: INTERNAL MEDICINE

## 2023-07-31 PROCEDURE — G8420 CALC BMI NORM PARAMETERS: HCPCS | Performed by: INTERNAL MEDICINE

## 2023-07-31 PROCEDURE — 1123F ACP DISCUSS/DSCN MKR DOCD: CPT | Performed by: INTERNAL MEDICINE

## 2023-07-31 PROCEDURE — 1036F TOBACCO NON-USER: CPT | Performed by: INTERNAL MEDICINE

## 2023-07-31 PROCEDURE — G8427 DOCREV CUR MEDS BY ELIG CLIN: HCPCS | Performed by: INTERNAL MEDICINE

## 2023-07-31 PROCEDURE — 99214 OFFICE O/P EST MOD 30 MIN: CPT | Performed by: INTERNAL MEDICINE

## 2023-07-31 PROCEDURE — 1090F PRES/ABSN URINE INCON ASSESS: CPT | Performed by: INTERNAL MEDICINE

## 2023-07-31 RX ORDER — METOPROLOL SUCCINATE 50 MG/1
50 TABLET, EXTENDED RELEASE ORAL
Qty: 90 TABLET | Refills: 3 | Status: SHIPPED | OUTPATIENT
Start: 2023-07-31

## 2023-07-31 RX ORDER — DILTIAZEM HYDROCHLORIDE 180 MG/1
180 CAPSULE, COATED, EXTENDED RELEASE ORAL EVERY EVENING
Qty: 90 CAPSULE | Refills: 3 | Status: SHIPPED | OUTPATIENT
Start: 2023-07-31

## 2023-07-31 ASSESSMENT — ENCOUNTER SYMPTOMS: SHORTNESS OF BREATH: 0

## 2023-07-31 NOTE — TELEPHONE ENCOUNTER
Told to start Diltiazem in the morning but they picked up at the pharmacy says 180mg take in the evening   and she is to take Metoprolol   In the morning  Please call because the facility will not give her this unless its told by us to change this directions  Please call   And then fax the correct directions         FAX #[phone]

## 2023-07-31 NOTE — TELEPHONE ENCOUNTER
Office note faxed to assisted living with change of cardizem and toprol//leonie Patient/Caregiver provided printed discharge information.

## 2023-07-31 NOTE — PROGRESS NOTES
on her anatomy seen at recent CLARENCE. I have assist with her family. - EKG 12 Lead    2. Primary hypertension  Blood pressure mildly low. Will decrease Cardizem to 180 mg a day. Encouraged hydration. We will need to monitor for any orthostatic symptoms. - metoprolol succinate (TOPROL XL) 50 MG extended release tablet; Take 1 tablet by mouth nightly  Dispense: 90 tablet; Refill: 3    3. RBBB (right bundle branch block with left anterior fascicular block)  Rate controlled. Monitor. Return for Return at next scheduled office. Tae Hilario MD  7/31/2023  1:39 PM    This note may have been dictated using speech recognition software.   As a result, error of speech recognition may have occurred

## 2023-08-01 ENCOUNTER — HOSPITAL ENCOUNTER (OUTPATIENT)
Dept: PET IMAGING | Age: 88
Discharge: HOME | End: 2023-08-04
Payer: MEDICARE

## 2023-08-01 DIAGNOSIS — R77.9 ABNORMALITY OF PLASMA PROTEIN, UNSPECIFIED: ICD-10-CM

## 2023-08-01 DIAGNOSIS — D47.2 MGUS (MONOCLONAL GAMMOPATHY OF UNKNOWN SIGNIFICANCE): ICD-10-CM

## 2023-08-01 LAB
GLUCOSE BLD STRIP.AUTO-MCNC: 111 MG/DL (ref 65–100)
SERVICE CMNT-IMP: ABNORMAL

## 2023-08-01 PROCEDURE — 78815 PET IMAGE W/CT SKULL-THIGH: CPT

## 2023-08-01 PROCEDURE — 3430000000 HC RX DIAGNOSTIC RADIOPHARMACEUTICAL: Performed by: INTERNAL MEDICINE

## 2023-08-01 PROCEDURE — 2580000003 HC RX 258: Performed by: INTERNAL MEDICINE

## 2023-08-01 PROCEDURE — A9552 F18 FDG: HCPCS | Performed by: INTERNAL MEDICINE

## 2023-08-01 PROCEDURE — 82962 GLUCOSE BLOOD TEST: CPT

## 2023-08-01 RX ORDER — SODIUM CHLORIDE 0.9 % (FLUSH) 0.9 %
10 SYRINGE (ML) INJECTION ONCE AS NEEDED
Status: COMPLETED | OUTPATIENT
Start: 2023-08-01 | End: 2023-08-01

## 2023-08-01 RX ORDER — FLUDEOXYGLUCOSE F 18 200 MCI/ML
11.77 INJECTION, SOLUTION INTRAVENOUS
Status: COMPLETED | OUTPATIENT
Start: 2023-08-01 | End: 2023-08-01

## 2023-08-01 RX ADMIN — SODIUM CHLORIDE, PRESERVATIVE FREE 10 ML: 5 INJECTION INTRAVENOUS at 11:25

## 2023-08-01 RX ADMIN — FLUDEOXYGLUCOSE F 18 11.77 MILLICURIE: 200 INJECTION, SOLUTION INTRAVENOUS at 11:25

## 2023-08-08 DIAGNOSIS — D47.2 MGUS (MONOCLONAL GAMMOPATHY OF UNKNOWN SIGNIFICANCE): Primary | ICD-10-CM

## 2023-08-08 DIAGNOSIS — I48.0 PAF (PAROXYSMAL ATRIAL FIBRILLATION) (HCC): Chronic | ICD-10-CM

## 2023-08-08 DIAGNOSIS — E55.9 VITAMIN D DEFICIENCY: ICD-10-CM

## 2023-08-08 ASSESSMENT — ENCOUNTER SYMPTOMS
VOICE CHANGE: 0
CONSTIPATION: 0
SHORTNESS OF BREATH: 0
TROUBLE SWALLOWING: 0
ABDOMINAL DISTENTION: 0
WHEEZING: 0
VOMITING: 0
SCLERAL ICTERUS: 0
ABDOMINAL PAIN: 0
DIARRHEA: 0
CHEST TIGHTNESS: 0
NAUSEA: 0
SORE THROAT: 0
HEMOPTYSIS: 0
BLOOD IN STOOL: 0

## 2023-08-08 NOTE — PROGRESS NOTES
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 Presenting Symptoms: No relevant physical symptoms reported. History of Present Illness:  Ms. Naina Anthony is a 80 y.o. female who presents today for follow up regarding MGUS. The past medical history is significant for vitamin D deficiency, PAF on AC, osteopenia, osteoarthritis,  occlusion and stenosis of carotid artery, HLD, mitral valve disorder, malignant neoplasm of rectosigmoid junction, IBS, HTN,  ovarian cancer, colon polyps, Graves disease, GERD, diverticulosis, bilateral carotid artery stenosis, aortic regurgitation, anxiety disorder, total colectomy, tonsillectomy, thyroidectomy, ROSALIA/BSO,  small bowel resection, heart catheterization, cyst I&D, cataract removal, vitrectomy, and orthopedic surgery. She was evaluated in consultation by Rheumatologist, Dr. Erica Lamb, on 11/4/21 after being referred by her PCP for evaluation of osteoporosis. Review of systems and physical exam were unremarkable. She was treated with Prolia with her first injection on 11/4/21 with plans for 60 mg Prolia injection every 6 months. 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 Ms. Naina Anthony was subsequently referred to [facility], per Rheumatology, for hematology evaluation and treatment of  monoclonal gammopathy.      We had a lengthy discussion regarding the pathophysiology of plasma cell disorders going over the spectrum of plasma cell diseases utilizing visual aids on the

## 2023-08-10 ENCOUNTER — HOSPITAL ENCOUNTER (OUTPATIENT)
Dept: LAB | Age: 88
Discharge: HOME | End: 2023-08-10
Payer: MEDICARE

## 2023-08-10 ENCOUNTER — OFFICE VISIT (OUTPATIENT)
Dept: ONCOLOGY | Age: 88
End: 2023-08-10
Payer: MEDICARE

## 2023-08-10 VITALS
OXYGEN SATURATION: 96 % | TEMPERATURE: 97.3 F | BODY MASS INDEX: 21.69 KG/M2 | HEIGHT: 65 IN | WEIGHT: 130.2 LBS | DIASTOLIC BLOOD PRESSURE: 70 MMHG | HEART RATE: 76 BPM | RESPIRATION RATE: 17 BRPM | SYSTOLIC BLOOD PRESSURE: 117 MMHG

## 2023-08-10 DIAGNOSIS — E55.9 VITAMIN D DEFICIENCY: ICD-10-CM

## 2023-08-10 DIAGNOSIS — R93.89 ABNORMAL FINDING OF DIAGNOSTIC IMAGING: ICD-10-CM

## 2023-08-10 DIAGNOSIS — R22.30 NODULE OF SKIN OF HAND: ICD-10-CM

## 2023-08-10 DIAGNOSIS — D47.2 MGUS (MONOCLONAL GAMMOPATHY OF UNKNOWN SIGNIFICANCE): Primary | ICD-10-CM

## 2023-08-10 DIAGNOSIS — Z79.52 CURRENT USE OF STEROID MEDICATION: ICD-10-CM

## 2023-08-10 DIAGNOSIS — D47.2 MGUS (MONOCLONAL GAMMOPATHY OF UNKNOWN SIGNIFICANCE): ICD-10-CM

## 2023-08-10 LAB
25(OH)D3 SERPL-MCNC: 49.6 NG/ML (ref 30–100)
ALBUMIN SERPL-MCNC: 3.3 G/DL (ref 3.2–4.6)
ALBUMIN/GLOB SERPL: 1 (ref 0.4–1.6)
ALP SERPL-CCNC: 56 U/L (ref 50–136)
ALT SERPL-CCNC: 22 U/L (ref 12–65)
ANION GAP SERPL CALC-SCNC: 9 MMOL/L (ref 2–11)
AST SERPL-CCNC: 14 U/L (ref 15–37)
BASOPHILS # BLD: 0 K/UL (ref 0–0.2)
BASOPHILS NFR BLD: 0 % (ref 0–2)
BILIRUB SERPL-MCNC: 0.5 MG/DL (ref 0.2–1.1)
BUN SERPL-MCNC: 36 MG/DL (ref 8–23)
CALCIUM SERPL-MCNC: 9.5 MG/DL (ref 8.3–10.4)
CHLORIDE SERPL-SCNC: 101 MMOL/L (ref 101–110)
CO2 SERPL-SCNC: 25 MMOL/L (ref 21–32)
CREAT SERPL-MCNC: 1.4 MG/DL (ref 0.6–1)
DIFFERENTIAL METHOD BLD: ABNORMAL
EOSINOPHIL # BLD: 0 K/UL (ref 0–0.8)
EOSINOPHIL NFR BLD: 0 % (ref 0.5–7.8)
ERYTHROCYTE [DISTWIDTH] IN BLOOD BY AUTOMATED COUNT: 16.8 % (ref 11.9–14.6)
GLOBULIN SER CALC-MCNC: 3.3 G/DL (ref 2.8–4.5)
GLUCOSE SERPL-MCNC: 202 MG/DL (ref 65–100)
HCT VFR BLD AUTO: 41.5 % (ref 35.8–46.3)
HGB BLD-MCNC: 13.7 G/DL (ref 11.7–15.4)
IMM GRANULOCYTES # BLD AUTO: 0.2 K/UL (ref 0–0.5)
IMM GRANULOCYTES NFR BLD AUTO: 2 % (ref 0–5)
LYMPHOCYTES # BLD: 0.6 K/UL (ref 0.5–4.6)
LYMPHOCYTES NFR BLD: 6 % (ref 13–44)
MCH RBC QN AUTO: 31.2 PG (ref 26.1–32.9)
MCHC RBC AUTO-ENTMCNC: 33 G/DL (ref 31.4–35)
MCV RBC AUTO: 94.5 FL (ref 82–102)
MONOCYTES # BLD: 0.2 K/UL (ref 0.1–1.3)
MONOCYTES NFR BLD: 2 % (ref 4–12)
NEUTS SEG # BLD: 9.4 K/UL (ref 1.7–8.2)
NEUTS SEG NFR BLD: 90 % (ref 43–78)
NRBC # BLD: 0 K/UL (ref 0–0.2)
PLATELET # BLD AUTO: 268 K/UL (ref 150–450)
PMV BLD AUTO: 8.7 FL (ref 9.4–12.3)
POTASSIUM SERPL-SCNC: 4 MMOL/L (ref 3.5–5.1)
PROT SERPL-MCNC: 6.6 G/DL (ref 6.3–8.2)
RBC # BLD AUTO: 4.39 M/UL (ref 4.05–5.2)
SODIUM SERPL-SCNC: 135 MMOL/L (ref 133–143)
WBC # BLD AUTO: 10.5 K/UL (ref 4.3–11.1)

## 2023-08-10 PROCEDURE — 85025 COMPLETE CBC W/AUTO DIFF WBC: CPT

## 2023-08-10 PROCEDURE — 1123F ACP DISCUSS/DSCN MKR DOCD: CPT | Performed by: INTERNAL MEDICINE

## 2023-08-10 PROCEDURE — G8420 CALC BMI NORM PARAMETERS: HCPCS | Performed by: INTERNAL MEDICINE

## 2023-08-10 PROCEDURE — 82306 VITAMIN D 25 HYDROXY: CPT

## 2023-08-10 PROCEDURE — 84165 PROTEIN E-PHORESIS SERUM: CPT

## 2023-08-10 PROCEDURE — 36415 COLL VENOUS BLD VENIPUNCTURE: CPT

## 2023-08-10 PROCEDURE — 84156 ASSAY OF PROTEIN URINE: CPT

## 2023-08-10 PROCEDURE — 1090F PRES/ABSN URINE INCON ASSESS: CPT | Performed by: INTERNAL MEDICINE

## 2023-08-10 PROCEDURE — 1036F TOBACCO NON-USER: CPT | Performed by: INTERNAL MEDICINE

## 2023-08-10 PROCEDURE — 80053 COMPREHEN METABOLIC PANEL: CPT

## 2023-08-10 PROCEDURE — 99215 OFFICE O/P EST HI 40 MIN: CPT | Performed by: INTERNAL MEDICINE

## 2023-08-10 PROCEDURE — 83521 IG LIGHT CHAINS FREE EACH: CPT

## 2023-08-10 PROCEDURE — 82784 ASSAY IGA/IGD/IGG/IGM EACH: CPT

## 2023-08-10 PROCEDURE — 86334 IMMUNOFIX E-PHORESIS SERUM: CPT

## 2023-08-10 PROCEDURE — 84166 PROTEIN E-PHORESIS/URINE/CSF: CPT

## 2023-08-10 PROCEDURE — G8427 DOCREV CUR MEDS BY ELIG CLIN: HCPCS | Performed by: INTERNAL MEDICINE

## 2023-08-10 RX ORDER — PREDNISONE 10 MG/1
10 TABLET ORAL DAILY
Qty: 20 TABLET | Refills: 0 | Status: SHIPPED | OUTPATIENT
Start: 2023-08-10 | End: 2023-08-30

## 2023-08-10 NOTE — PATIENT INSTRUCTIONS
symptoms:   Fever of 100.5 or greater  Chills  Shortness of breath  Swelling or pain in one leg    After office hours an answering service is available and will contact a provider for emergencies or if you are experiencing any of the above symptoms. Patient did express an interest in My Chart. My Chart log in information explained on the after visit summary printout at the [address] desk.     Rosalva Damian RN

## 2023-08-11 LAB
KAPPA LC FREE SER-MCNC: 234.8 MG/L (ref 3.3–19.4)
KAPPA LC FREE/LAMBDA FREE SER: 37.27 (ref 0.26–1.65)
LAMBDA LC FREE SERPL-MCNC: 6.3 MG/L (ref 5.7–26.3)

## 2023-08-14 LAB
ALBUMIN SERPL ELPH-MCNC: 3.5 G/DL (ref 2.9–4.4)
ALBUMIN/GLOB SERPL: 1.5 (ref 0.7–1.7)
ALPHA1 GLOB SERPL ELPH-MCNC: 0.1 G/DL (ref 0–0.4)
ALPHA2 GLOB SERPL ELPH-MCNC: 0.6 G/DL (ref 0.4–1)
B-GLOBULIN SERPL ELPH-MCNC: 0.8 G/DL (ref 0.7–1.3)
GAMMA GLOB SERPL ELPH-MCNC: 0.9 G/DL (ref 0.4–1.8)
GLOBULIN SER-MCNC: 2.4 G/DL (ref 2.2–3.9)
IGA SERPL-MCNC: 54 MG/DL (ref 64–422)
IGG SERPL-MCNC: 983 MG/DL (ref 586–1602)
IGM SERPL-MCNC: 22 MG/DL (ref 26–217)
INTERPRETATION SERPL IEP-IMP: ABNORMAL
M PROTEIN SERPL ELPH-MCNC: 0.7 G/DL
PROT SERPL-MCNC: 5.9 G/DL (ref 6–8.5)

## 2023-08-15 PROBLEM — R93.89 ABNORMAL FINDING OF DIAGNOSTIC IMAGING: Status: ACTIVE | Noted: 2023-08-15

## 2023-08-15 PROBLEM — E55.9 VITAMIN D DEFICIENCY: Status: ACTIVE | Noted: 2023-08-15

## 2023-08-16 ENCOUNTER — TELEPHONE (OUTPATIENT)
Dept: NEUROSURGERY | Age: 88
End: 2023-08-16

## 2023-08-16 NOTE — TELEPHONE ENCOUNTER
----- Message from Autumn Boston MD sent at 7/3/2023 12:10 PM EDT -----  Charissa,    I have reviewed the patient's referral and feel the patient needs to be seen as  \"next available. \"     Jahaira Ribeiro. Paul Saeglo, [address] [place] [address]       ----- Message -----  From: Fam Healy  Sent: 6/30/2023  12:31 PM EDT  To: Autumn Boston MD    Referral from next door mri in [facility] for 7/24 one completed in April. Brandan Sosa -[facility]acs

## 2023-08-17 LAB
ALBUMIN MFR UR ELPH: 38.5 %
ALPHA1 GLOB MFR UR ELPH: 3.1 %
ALPHA2 GLOB 24H MFR UR ELPH: 4.4 %
B-GLOBULIN 24H MFR UR ELPH: 17.1 %
GAMMA GLOB 24H MFR UR ELPH: 36.9 %
LABORATORY COMMENT REPORT: ABNORMAL
M PROTEIN MFR UR ELPH: 28.6 %
PROT UR-MCNC: 72.5 MG/DL

## 2023-08-30 ENCOUNTER — TELEPHONE (OUTPATIENT)
Dept: ONCOLOGY | Age: 88
End: 2023-08-30

## 2023-08-30 NOTE — TELEPHONE ENCOUNTER
Physician provider: Winnie Luke MD  Reason for today's call: Other   Last office visit:N/A    Patient notified that their information will be routed to the [facility] clinical triage team for review. Patient is advised that they will receive a phone call from the triage department. If symptoms worsen before receiving a call back, the patient has been advised to proceed to the nearest ED. Patient 's physical therapist Glenda Wiley called , stated pt was referred to her by pt's PCP for her sciatica and after reviewing medical history with patient, Patient mentioned that she was being treated by Ashish Coon for a spot on her spine.   Physical therapist requesting most recent office notes or something saying no restrictions     Best call back # [phone]    Fax # [phone]

## 2023-08-31 NOTE — TELEPHONE ENCOUNTER
OK to send last OV note per pod nurse. Note faxed. Confirmation received. Left VM stating fax was sent.

## 2023-09-06 ENCOUNTER — OFFICE VISIT (OUTPATIENT)
Dept: NEUROSURGERY | Age: 88
End: 2023-09-06
Payer: MEDICARE

## 2023-09-06 VITALS
HEART RATE: 117 BPM | SYSTOLIC BLOOD PRESSURE: 115 MMHG | DIASTOLIC BLOOD PRESSURE: 80 MMHG | OXYGEN SATURATION: 97 % | TEMPERATURE: 97 F | HEIGHT: 65 IN | BODY MASS INDEX: 21.83 KG/M2

## 2023-09-06 DIAGNOSIS — M48.062 LUMBAR STENOSIS WITH NEUROGENIC CLAUDICATION: ICD-10-CM

## 2023-09-06 DIAGNOSIS — M51.36 DEGENERATIVE DISC DISEASE, LUMBAR: ICD-10-CM

## 2023-09-06 DIAGNOSIS — G89.29 CHRONIC LEFT-SIDED LOW BACK PAIN WITH LEFT-SIDED SCIATICA: ICD-10-CM

## 2023-09-06 DIAGNOSIS — M54.42 CHRONIC LEFT-SIDED LOW BACK PAIN WITH LEFT-SIDED SCIATICA: ICD-10-CM

## 2023-09-06 DIAGNOSIS — D49.2 NERVE SHEATH TUMOR: ICD-10-CM

## 2023-09-06 DIAGNOSIS — M41.50 DEGENERATIVE SCOLIOSIS IN ADULT PATIENT: Primary | ICD-10-CM

## 2023-09-06 PROCEDURE — 1090F PRES/ABSN URINE INCON ASSESS: CPT | Performed by: NEUROLOGICAL SURGERY

## 2023-09-06 PROCEDURE — 1036F TOBACCO NON-USER: CPT | Performed by: NEUROLOGICAL SURGERY

## 2023-09-06 PROCEDURE — G8420 CALC BMI NORM PARAMETERS: HCPCS | Performed by: NEUROLOGICAL SURGERY

## 2023-09-06 PROCEDURE — 1123F ACP DISCUSS/DSCN MKR DOCD: CPT | Performed by: NEUROLOGICAL SURGERY

## 2023-09-06 PROCEDURE — 99203 OFFICE O/P NEW LOW 30 MIN: CPT | Performed by: NEUROLOGICAL SURGERY

## 2023-09-06 PROCEDURE — G8427 DOCREV CUR MEDS BY ELIG CLIN: HCPCS | Performed by: NEUROLOGICAL SURGERY

## 2023-09-06 NOTE — PROGRESS NOTES
[place]     Notes are transcribed with [email or web address], a medical voice recording dictation service, and may contain minor errors.

## 2023-09-15 ENCOUNTER — OFFICE VISIT (OUTPATIENT)
Age: 88
End: 2023-09-15

## 2023-09-15 VITALS
SYSTOLIC BLOOD PRESSURE: 132 MMHG | WEIGHT: 132 LBS | BODY MASS INDEX: 25.91 KG/M2 | HEIGHT: 60 IN | DIASTOLIC BLOOD PRESSURE: 74 MMHG | HEART RATE: 92 BPM

## 2023-09-15 DIAGNOSIS — I10 PRIMARY HYPERTENSION: Chronic | ICD-10-CM

## 2023-09-15 DIAGNOSIS — E78.00 HYPERCHOLESTEROLEMIA: Chronic | ICD-10-CM

## 2023-09-15 DIAGNOSIS — M25.00 HEMARTHROSIS: ICD-10-CM

## 2023-09-15 DIAGNOSIS — I48.11 LONGSTANDING PERSISTENT ATRIAL FIBRILLATION (HCC): Primary | ICD-10-CM

## 2023-09-15 ASSESSMENT — ENCOUNTER SYMPTOMS: SHORTNESS OF BREATH: 0

## 2023-09-15 NOTE — PROGRESS NOTES
stopped due to conduction system disease 5/19. Tried Multaq but severe diarrhea. Plan to try increased B-blocker dose. 4.  CLARENCE/DCCV (7/11/23):  EF 60-65%. Mild AR/MR. Successful DCCV. 5.  Recurrent atrial fibrillation 7/20/23. Planned rate control and Cardizem added to Toprol        Hypercholesterolemia 10/16/2015     Priority: Low    Postmenopausal atrophic vaginitis 06/24/2015     Priority: Low    Anxiety 06/23/2015     Priority: Low    Arthritis      Priority: Low     osteo. generalized        Asymptomatic bilateral carotid artery stenosis 08/25/2014     Priority: Low    Aortic regurgitation 04/28/2014     Priority: Low     1.  Echo (2/16/16): Mild to moderate aortic regurgitation. Diverticulosis of large intestine 12/23/2013     Priority: Low     Hx of diverticulitis            Social History     Tobacco Use    Smoking status: Never    Smokeless tobacco: Never   Substance Use Topics    Alcohol use: Yes     Comment: Occasionally       ROS:    Review of Systems   Constitutional: Negative for malaise/fatigue. Cardiovascular:  Positive for dyspnea on exertion. Negative for chest pain. Respiratory:  Negative for shortness of breath. Musculoskeletal:  Positive for arthritis. Neurological:  Negative for focal weakness. Psychiatric/Behavioral:  Negative for depression. PHYSICAL EXAM:  Wt Readings from Last 3 Encounters:   09/15/23 132 lb (59.9 kg)   08/10/23 130 lb 3.2 oz (59.1 kg)   07/31/23 129 lb (58.5 kg)     BP Readings from Last 3 Encounters:   09/15/23 132/74   09/06/23 115/80   08/10/23 117/70     Pulse Readings from Last 3 Encounters:   09/15/23 92   09/06/23 (!) 117   08/10/23 76       Physical Exam  Constitutional:       General: She is not in acute distress. Neck:      Vascular: No carotid bruit. Cardiovascular:      Rate and Rhythm: Tachycardia present. Rhythm irregular. Pulmonary:      Effort: No respiratory distress. Breath sounds: Normal breath sounds.

## 2023-09-18 ENCOUNTER — TELEPHONE (OUTPATIENT)
Age: 88
End: 2023-09-18

## 2023-09-18 NOTE — TELEPHONE ENCOUNTER
Patient having Caudal epidural steriod injection with Dr. Bentley Madera on 09/22/23. Requesting Eliquis hold for 3 days prior.  Fax: [phone]

## 2023-09-28 ENCOUNTER — TELEPHONE (OUTPATIENT)
Age: 88
End: 2023-09-28

## 2023-09-28 DIAGNOSIS — I48.11 LONGSTANDING PERSISTENT ATRIAL FIBRILLATION (HCC): Primary | ICD-10-CM

## 2023-09-28 NOTE — TELEPHONE ENCOUNTER
Daughter in law notified of results, voiced understanding. has appointment with DR. Garcia//leonie     ----- Message from Mustapha Hilario MD sent at 9/27/2023  7:06 PM EDT -----  Please call patient. Her heart rate is poorly controlled. Needs to see Dr. Shanon Fuller to discuss options such as bi-V pacemaker and AV node ablation. Can we get her appt.    Thank you

## 2023-09-28 NOTE — TELEPHONE ENCOUNTER
Order entered//brendab----- Message from Rashmi Smith sent at 9/28/2023  8:28 AM EDT -----  Please put in referral for patient to see Dr. Serena Su per Dr. Susana Samuel to discuss PPM and AV Node ablation due to poorly controlled heart rate. Thank you.

## 2023-10-09 NOTE — TELEPHONE ENCOUNTER
Detail Level: Generalized The patient tested positive to COVID-19. She would like to get any advice about it. She's having a runny nose,fatigue and lack of appetite. Please call her back at [phone]. Detail Level: Detailed Detail Level: Zone

## 2023-10-19 ENCOUNTER — TRANSCRIBE ORDERS (OUTPATIENT)
Dept: SCHEDULING | Age: 88
End: 2023-10-19

## 2023-10-19 DIAGNOSIS — Z12.31 SCREENING MAMMOGRAM FOR HIGH-RISK PATIENT: Primary | ICD-10-CM

## 2023-10-25 ENCOUNTER — INITIAL CONSULT (OUTPATIENT)
Age: 88
End: 2023-10-25
Payer: MEDICARE

## 2023-10-25 VITALS
WEIGHT: 131.9 LBS | SYSTOLIC BLOOD PRESSURE: 122 MMHG | HEART RATE: 119 BPM | BODY MASS INDEX: 21.98 KG/M2 | DIASTOLIC BLOOD PRESSURE: 86 MMHG | HEIGHT: 65 IN

## 2023-10-25 DIAGNOSIS — E55.9 VITAMIN D DEFICIENCY: ICD-10-CM

## 2023-10-25 DIAGNOSIS — I10 PRIMARY HYPERTENSION: Chronic | ICD-10-CM

## 2023-10-25 DIAGNOSIS — D47.2 MGUS (MONOCLONAL GAMMOPATHY OF UNKNOWN SIGNIFICANCE): ICD-10-CM

## 2023-10-25 DIAGNOSIS — Z79.01 CHRONIC ANTICOAGULATION: Chronic | ICD-10-CM

## 2023-10-25 DIAGNOSIS — I48.11 LONGSTANDING PERSISTENT ATRIAL FIBRILLATION (HCC): Primary | ICD-10-CM

## 2023-10-25 DIAGNOSIS — I48.19 PERSISTENT ATRIAL FIBRILLATION (HCC): ICD-10-CM

## 2023-10-25 DIAGNOSIS — I48.11 LONGSTANDING PERSISTENT ATRIAL FIBRILLATION (HCC): ICD-10-CM

## 2023-10-25 LAB
25(OH)D3 SERPL-MCNC: 54.5 NG/ML (ref 30–100)
ANION GAP SERPL CALC-SCNC: 4 MMOL/L (ref 2–11)
BUN SERPL-MCNC: 18 MG/DL (ref 8–23)
CALCIUM SERPL-MCNC: 10.5 MG/DL (ref 8.3–10.4)
CHLORIDE SERPL-SCNC: 104 MMOL/L (ref 101–110)
CO2 SERPL-SCNC: 28 MMOL/L (ref 21–32)
CREAT SERPL-MCNC: 1 MG/DL (ref 0.6–1)
ERYTHROCYTE [DISTWIDTH] IN BLOOD BY AUTOMATED COUNT: 13.5 % (ref 11.9–14.6)
GLUCOSE SERPL-MCNC: 82 MG/DL (ref 65–100)
HCT VFR BLD AUTO: 42.9 % (ref 35.8–46.3)
HGB BLD-MCNC: 13.6 G/DL (ref 11.7–15.4)
MAGNESIUM SERPL-MCNC: 2 MG/DL (ref 1.8–2.4)
MCH RBC QN AUTO: 30.9 PG (ref 26.1–32.9)
MCHC RBC AUTO-ENTMCNC: 31.7 G/DL (ref 31.4–35)
MCV RBC AUTO: 97.5 FL (ref 82–102)
NRBC # BLD: 0 K/UL (ref 0–0.2)
PLATELET # BLD AUTO: 370 K/UL (ref 150–450)
PMV BLD AUTO: 9.2 FL (ref 9.4–12.3)
POTASSIUM SERPL-SCNC: 4.2 MMOL/L (ref 3.5–5.1)
RBC # BLD AUTO: 4.4 M/UL (ref 4.05–5.2)
SODIUM SERPL-SCNC: 136 MMOL/L (ref 133–143)
WBC # BLD AUTO: 7.1 K/UL (ref 4.3–11.1)

## 2023-10-25 PROCEDURE — 99204 OFFICE O/P NEW MOD 45 MIN: CPT | Performed by: INTERNAL MEDICINE

## 2023-10-25 PROCEDURE — G8420 CALC BMI NORM PARAMETERS: HCPCS | Performed by: INTERNAL MEDICINE

## 2023-10-25 PROCEDURE — 1036F TOBACCO NON-USER: CPT | Performed by: INTERNAL MEDICINE

## 2023-10-25 PROCEDURE — G8484 FLU IMMUNIZE NO ADMIN: HCPCS | Performed by: INTERNAL MEDICINE

## 2023-10-25 PROCEDURE — G8427 DOCREV CUR MEDS BY ELIG CLIN: HCPCS | Performed by: INTERNAL MEDICINE

## 2023-10-25 PROCEDURE — 1123F ACP DISCUSS/DSCN MKR DOCD: CPT | Performed by: INTERNAL MEDICINE

## 2023-10-25 PROCEDURE — 1090F PRES/ABSN URINE INCON ASSESS: CPT | Performed by: INTERNAL MEDICINE

## 2023-10-25 PROCEDURE — 93000 ELECTROCARDIOGRAM COMPLETE: CPT | Performed by: INTERNAL MEDICINE

## 2023-10-25 NOTE — PROGRESS NOTES
[address], [address]  PHONE: [phone]  Sheron Anderson  3/7/1934    Chief Complant:    Chief Complaint   Patient presents with    Hypertension    Atrial Fibrillation      Consultation is requested by [unfilled] for evaluation of Hypertension and Atrial Fibrillation    Reason for Consultation: afib    History:  Sheron Anderson is a very pleasant 80 y.o. female with a past medical and cardiac history significant for MGUS, HTN, persistent atrial fibrillation s/p recent failed DCCV and presents to discuss management of afib. Pt main complaints are fatigue and MARTINEZ. She has been feeling poorly for months. HR is elevated most of the time. Cardiac PMH: (Old records have been reviewed and summarized below)  Monitor (9/21/23):  1. Persistent atrial fibrillation with poor rate control. Rate 69 to 173 with average rate 109. TTE (6/9/23): EF 50%    Reviewed office note Dr. Jessy Ahuja 9/15/23    Past Medical History, Past Surgical History, Family history, Social History, and Medications were all reviewed with the patient today and updated as necessary.      Current Outpatient Medications   Medication Sig Dispense Refill    dilTIAZem (CARTIA XT) 180 MG extended release capsule Take 1 capsule by mouth every evening 90 capsule 3    metoprolol succinate (TOPROL XL) 50 MG extended release tablet Take 1 tablet by mouth nightly 90 tablet 3    indapamide (LOZOL) 1.25 MG tablet Take 1 tablet by mouth daily 90 tablet 3    denosumab (PROLIA) 60 MG/ML SOSY SC injection Inject 1 mL into the skin once yearly      apixaban (ELIQUIS) 2.5 MG TABS tablet Take 1 tablet by mouth 2 times daily Take 1/2 tablet  tablet 3    bimatoprost (LUMIGAN) 0.01 % SOLN ophthalmic drops Place 1 drop into both eyes nightly      SYNTHROID 75 MCG tablet Take 1 tablet by mouth Daily 90 tablet 3    acetaminophen (TYLENOL) 325 MG tablet Take 2 tablets by mouth every 6 hours as needed

## 2023-10-27 NOTE — PROGRESS NOTES
Patient pre-assessment complete for BiV Pacemaker and AV Node Ablation with Dr. Scotty Garcia scheduled for 10/30/23 at 12:00pm, arrival time 09:30am. Patient verified using . Patient instructed to bring a list of home medications on the day of procedure. NPO status reinforced. Patient instructed to follow EP Information Sheet given at appointment. Patient verbalizes understanding of all instructions & denies any questions at this time.

## 2023-10-30 ENCOUNTER — APPOINTMENT (OUTPATIENT)
Dept: GENERAL RADIOLOGY | Age: 88
End: 2023-10-30
Attending: INTERNAL MEDICINE
Payer: MEDICARE

## 2023-10-30 ENCOUNTER — ANESTHESIA EVENT (OUTPATIENT)
Dept: CARDIAC CATH/INVASIVE PROCEDURES | Age: 88
End: 2023-10-30
Payer: MEDICARE

## 2023-10-30 ENCOUNTER — HOSPITAL ENCOUNTER (OUTPATIENT)
Age: 88
Setting detail: OBSERVATION
Discharge: HOME | End: 2023-10-31
Attending: INTERNAL MEDICINE | Admitting: INTERNAL MEDICINE
Payer: MEDICARE

## 2023-10-30 ENCOUNTER — ANESTHESIA (OUTPATIENT)
Dept: CARDIAC CATH/INVASIVE PROCEDURES | Age: 88
End: 2023-10-30
Payer: MEDICARE

## 2023-10-30 DIAGNOSIS — G89.18 POST-OP PAIN: ICD-10-CM

## 2023-10-30 DIAGNOSIS — Z09 HOSPITAL DISCHARGE FOLLOW-UP: Primary | ICD-10-CM

## 2023-10-30 DIAGNOSIS — I48.19 PERSISTENT ATRIAL FIBRILLATION (HCC): ICD-10-CM

## 2023-10-30 LAB
ECHO BSA: 1.65 M2
EKG DIAGNOSIS: NORMAL
EKG Q-T INTERVAL: 330 MS
EKG QRS DURATION: 148 MS
EKG QTC CALCULATION (BAZETT): 496 MS
EKG R AXIS: -37 DEGREES
EKG T AXIS: -82 DEGREES
EKG VENTRICULAR RATE: 136 BPM

## 2023-10-30 PROCEDURE — 2500000003 HC RX 250 WO HCPCS: Performed by: INTERNAL MEDICINE

## 2023-10-30 PROCEDURE — C1900 LEAD, CORONARY VENOUS: HCPCS | Performed by: INTERNAL MEDICINE

## 2023-10-30 PROCEDURE — 71045 X-RAY EXAM CHEST 1 VIEW: CPT

## 2023-10-30 PROCEDURE — 3700000001 HC ADD 15 MINUTES (ANESTHESIA): Performed by: INTERNAL MEDICINE

## 2023-10-30 PROCEDURE — 2580000003 HC RX 258: Performed by: INTERNAL MEDICINE

## 2023-10-30 PROCEDURE — 3700000000 HC ANESTHESIA ATTENDED CARE: Performed by: INTERNAL MEDICINE

## 2023-10-30 PROCEDURE — 2500000003 HC RX 250 WO HCPCS

## 2023-10-30 PROCEDURE — 2720000010 HC SURG SUPPLY STERILE: Performed by: INTERNAL MEDICINE

## 2023-10-30 PROCEDURE — C1760 CLOSURE DEV, VASC: HCPCS | Performed by: INTERNAL MEDICINE

## 2023-10-30 PROCEDURE — 33225 L VENTRIC PACING LEAD ADD-ON: CPT | Performed by: INTERNAL MEDICINE

## 2023-10-30 PROCEDURE — C1892 INTRO/SHEATH,FIXED,PEEL-AWAY: HCPCS | Performed by: INTERNAL MEDICINE

## 2023-10-30 PROCEDURE — G0378 HOSPITAL OBSERVATION PER HR: HCPCS

## 2023-10-30 PROCEDURE — 33207 INSERT HEART PM VENTRICULAR: CPT | Performed by: INTERNAL MEDICINE

## 2023-10-30 PROCEDURE — 2709999900 HC NON-CHARGEABLE SUPPLY: Performed by: INTERNAL MEDICINE

## 2023-10-30 PROCEDURE — C2621 PMKR, OTHER THAN SING/DUAL: HCPCS | Performed by: INTERNAL MEDICINE

## 2023-10-30 PROCEDURE — C1898 LEAD, PMKR, OTHER THAN TRANS: HCPCS | Performed by: INTERNAL MEDICINE

## 2023-10-30 PROCEDURE — 2580000003 HC RX 258

## 2023-10-30 PROCEDURE — C1732 CATH, EP, DIAG/ABL, 3D/VECT: HCPCS | Performed by: INTERNAL MEDICINE

## 2023-10-30 PROCEDURE — 93650 ICAR CATH ABLTJ AV NODE FUNC: CPT | Performed by: INTERNAL MEDICINE

## 2023-10-30 PROCEDURE — C1769 GUIDE WIRE: HCPCS | Performed by: INTERNAL MEDICINE

## 2023-10-30 PROCEDURE — C1894 INTRO/SHEATH, NON-LASER: HCPCS | Performed by: INTERNAL MEDICINE

## 2023-10-30 PROCEDURE — 6360000002 HC RX W HCPCS: Performed by: INTERNAL MEDICINE

## 2023-10-30 PROCEDURE — 6360000002 HC RX W HCPCS: Performed by: ANESTHESIOLOGY

## 2023-10-30 PROCEDURE — 6360000002 HC RX W HCPCS

## 2023-10-30 PROCEDURE — 33208 INSRT HEART PM ATRIAL & VENT: CPT | Performed by: INTERNAL MEDICINE

## 2023-10-30 PROCEDURE — 93600 BUNDLE OF HIS RECORDING: CPT | Performed by: INTERNAL MEDICINE

## 2023-10-30 PROCEDURE — 6360000004 HC RX CONTRAST MEDICATION: Performed by: INTERNAL MEDICINE

## 2023-10-30 PROCEDURE — 6370000000 HC RX 637 (ALT 250 FOR IP): Performed by: INTERNAL MEDICINE

## 2023-10-30 DEVICE — IMPLANTABLE DEVICE
Type: IMPLANTABLE DEVICE | Status: FUNCTIONAL
Brand: EDORA 8 HF-T QP

## 2023-10-30 DEVICE — IMPLANTABLE DEVICE
Type: IMPLANTABLE DEVICE | Status: FUNCTIONAL
Brand: SOLIA S 60

## 2023-10-30 DEVICE — IMPLANTABLE DEVICE
Type: IMPLANTABLE DEVICE | Status: FUNCTIONAL
Brand: SENTUS PROMRI OTW QP L-85/49

## 2023-10-30 RX ORDER — SODIUM CHLORIDE, SODIUM LACTATE, POTASSIUM CHLORIDE, CALCIUM CHLORIDE 600; 310; 30; 20 MG/100ML; MG/100ML; MG/100ML; MG/100ML
INJECTION, SOLUTION INTRAVENOUS CONTINUOUS
Status: DISCONTINUED | OUTPATIENT
Start: 2023-10-30 | End: 2023-10-31 | Stop reason: HOSPADM

## 2023-10-30 RX ORDER — LEVOTHYROXINE SODIUM 0.07 MG/1
75 TABLET ORAL DAILY
Status: DISCONTINUED | OUTPATIENT
Start: 2023-10-31 | End: 2023-10-31 | Stop reason: HOSPADM

## 2023-10-30 RX ORDER — ACETAMINOPHEN 500 MG
1000 TABLET ORAL ONCE
Status: CANCELLED | OUTPATIENT
Start: 2023-10-30 | End: 2023-10-30

## 2023-10-30 RX ORDER — OXYCODONE HYDROCHLORIDE 5 MG/1
5 TABLET ORAL PRN
Status: DISCONTINUED | OUTPATIENT
Start: 2023-10-30 | End: 2023-10-30 | Stop reason: HOSPADM

## 2023-10-30 RX ORDER — SODIUM CHLORIDE 9 MG/ML
INJECTION, SOLUTION INTRAVENOUS PRN
Status: DISCONTINUED | OUTPATIENT
Start: 2023-10-30 | End: 2023-10-30 | Stop reason: HOSPADM

## 2023-10-30 RX ORDER — ONDANSETRON 2 MG/ML
4 INJECTION INTRAMUSCULAR; INTRAVENOUS
Status: DISCONTINUED | OUTPATIENT
Start: 2023-10-30 | End: 2023-10-30 | Stop reason: HOSPADM

## 2023-10-30 RX ORDER — PROPOFOL 10 MG/ML
INJECTION, EMULSION INTRAVENOUS PRN
Status: DISCONTINUED | OUTPATIENT
Start: 2023-10-30 | End: 2023-10-30 | Stop reason: SDUPTHER

## 2023-10-30 RX ORDER — SODIUM CHLORIDE 0.9 % (FLUSH) 0.9 %
5-40 SYRINGE (ML) INJECTION PRN
Status: CANCELLED | OUTPATIENT
Start: 2023-10-30

## 2023-10-30 RX ORDER — OXYCODONE HYDROCHLORIDE 5 MG/1
10 TABLET ORAL PRN
Status: DISCONTINUED | OUTPATIENT
Start: 2023-10-30 | End: 2023-10-30 | Stop reason: HOSPADM

## 2023-10-30 RX ORDER — HYDROMORPHONE HYDROCHLORIDE 2 MG/ML
0.5 INJECTION, SOLUTION INTRAMUSCULAR; INTRAVENOUS; SUBCUTANEOUS EVERY 5 MIN PRN
Status: DISCONTINUED | OUTPATIENT
Start: 2023-10-30 | End: 2023-10-30 | Stop reason: HOSPADM

## 2023-10-30 RX ORDER — LIDOCAINE HYDROCHLORIDE 10 MG/ML
1 INJECTION, SOLUTION INFILTRATION; PERINEURAL
Status: CANCELLED | OUTPATIENT
Start: 2023-10-30 | End: 2023-10-31

## 2023-10-30 RX ORDER — SODIUM CHLORIDE 0.9 % (FLUSH) 0.9 %
5-40 SYRINGE (ML) INJECTION EVERY 12 HOURS SCHEDULED
Status: DISCONTINUED | OUTPATIENT
Start: 2023-10-30 | End: 2023-10-30 | Stop reason: HOSPADM

## 2023-10-30 RX ORDER — METOPROLOL SUCCINATE 25 MG/1
50 TABLET, EXTENDED RELEASE ORAL
Status: DISCONTINUED | OUTPATIENT
Start: 2023-10-30 | End: 2023-10-31 | Stop reason: HOSPADM

## 2023-10-30 RX ORDER — PANTOPRAZOLE SODIUM 40 MG/1
40 TABLET, DELAYED RELEASE ORAL
Status: DISCONTINUED | OUTPATIENT
Start: 2023-10-31 | End: 2023-10-31 | Stop reason: HOSPADM

## 2023-10-30 RX ORDER — PANTOPRAZOLE SODIUM 20 MG/1
20 TABLET, DELAYED RELEASE ORAL
Status: DISCONTINUED | OUTPATIENT
Start: 2023-10-31 | End: 2023-10-30 | Stop reason: DRUGHIGH

## 2023-10-30 RX ORDER — SODIUM CHLORIDE 0.9 % (FLUSH) 0.9 %
5-40 SYRINGE (ML) INJECTION EVERY 12 HOURS SCHEDULED
Status: CANCELLED | OUTPATIENT
Start: 2023-10-30

## 2023-10-30 RX ORDER — PROCHLORPERAZINE EDISYLATE 5 MG/ML
5 INJECTION INTRAMUSCULAR; INTRAVENOUS
Status: DISCONTINUED | OUTPATIENT
Start: 2023-10-30 | End: 2023-10-30 | Stop reason: HOSPADM

## 2023-10-30 RX ORDER — LIDOCAINE HYDROCHLORIDE 10 MG/ML
INJECTION, SOLUTION INFILTRATION; PERINEURAL PRN
Status: DISCONTINUED | OUTPATIENT
Start: 2023-10-30 | End: 2023-10-30 | Stop reason: HOSPADM

## 2023-10-30 RX ORDER — SODIUM CHLORIDE 9 MG/ML
INJECTION, SOLUTION INTRAVENOUS PRN
Status: CANCELLED | OUTPATIENT
Start: 2023-10-30

## 2023-10-30 RX ORDER — LIDOCAINE HYDROCHLORIDE 20 MG/ML
INJECTION, SOLUTION EPIDURAL; INFILTRATION; INTRACAUDAL; PERINEURAL PRN
Status: DISCONTINUED | OUTPATIENT
Start: 2023-10-30 | End: 2023-10-30 | Stop reason: SDUPTHER

## 2023-10-30 RX ORDER — DIPHENHYDRAMINE HYDROCHLORIDE 50 MG/ML
12.5 INJECTION INTRAMUSCULAR; INTRAVENOUS
Status: DISCONTINUED | OUTPATIENT
Start: 2023-10-30 | End: 2023-10-30 | Stop reason: HOSPADM

## 2023-10-30 RX ORDER — SODIUM CHLORIDE, SODIUM LACTATE, POTASSIUM CHLORIDE, CALCIUM CHLORIDE 600; 310; 30; 20 MG/100ML; MG/100ML; MG/100ML; MG/100ML
INJECTION, SOLUTION INTRAVENOUS CONTINUOUS
Status: CANCELLED | OUTPATIENT
Start: 2023-10-30

## 2023-10-30 RX ORDER — ACETAMINOPHEN 325 MG/1
650 TABLET ORAL EVERY 6 HOURS
Status: DISCONTINUED | OUTPATIENT
Start: 2023-10-30 | End: 2023-10-31 | Stop reason: HOSPADM

## 2023-10-30 RX ORDER — LIDOCAINE HYDROCHLORIDE AND EPINEPHRINE 10; 10 MG/ML; UG/ML
INJECTION, SOLUTION INFILTRATION; PERINEURAL PRN
Status: DISCONTINUED | OUTPATIENT
Start: 2023-10-30 | End: 2023-10-30 | Stop reason: HOSPADM

## 2023-10-30 RX ORDER — OXYCODONE HYDROCHLORIDE 5 MG/1
5 TABLET ORAL EVERY 4 HOURS PRN
Status: DISCONTINUED | OUTPATIENT
Start: 2023-10-30 | End: 2023-10-31 | Stop reason: HOSPADM

## 2023-10-30 RX ORDER — SODIUM CHLORIDE 0.9 % (FLUSH) 0.9 %
5-40 SYRINGE (ML) INJECTION PRN
Status: DISCONTINUED | OUTPATIENT
Start: 2023-10-30 | End: 2023-10-30 | Stop reason: HOSPADM

## 2023-10-30 RX ORDER — MIDAZOLAM HYDROCHLORIDE 2 MG/2ML
2 INJECTION, SOLUTION INTRAMUSCULAR; INTRAVENOUS
Status: CANCELLED | OUTPATIENT
Start: 2023-10-30 | End: 2023-10-31

## 2023-10-30 RX ADMIN — ACETAMINOPHEN 650 MG: 325 TABLET ORAL at 21:31

## 2023-10-30 RX ADMIN — HYDROMORPHONE HYDROCHLORIDE 0.5 MG: 2 INJECTION, SOLUTION INTRAMUSCULAR; INTRAVENOUS; SUBCUTANEOUS at 14:07

## 2023-10-30 RX ADMIN — PROPOFOL 40 MG: 10 INJECTION, EMULSION INTRAVENOUS at 11:51

## 2023-10-30 RX ADMIN — LIDOCAINE HYDROCHLORIDE 40 MG: 20 INJECTION, SOLUTION EPIDURAL; INFILTRATION; INTRACAUDAL; PERINEURAL at 11:51

## 2023-10-30 RX ADMIN — ACETAMINOPHEN 650 MG: 325 TABLET ORAL at 17:44

## 2023-10-30 RX ADMIN — OXYCODONE HYDROCHLORIDE 5 MG: 5 TABLET ORAL at 18:38

## 2023-10-30 RX ADMIN — PHENYLEPHRINE HYDROCHLORIDE 100 MCG: 10 INJECTION INTRAVENOUS at 13:07

## 2023-10-30 RX ADMIN — METOPROLOL SUCCINATE 50 MG: 25 TABLET, FILM COATED, EXTENDED RELEASE ORAL at 20:22

## 2023-10-30 RX ADMIN — SODIUM CHLORIDE, SODIUM LACTATE, POTASSIUM CHLORIDE, AND CALCIUM CHLORIDE: 600; 310; 30; 20 INJECTION, SOLUTION INTRAVENOUS at 11:40

## 2023-10-30 RX ADMIN — CEFAZOLIN SODIUM 2000 MG: 100 INJECTION, POWDER, LYOPHILIZED, FOR SOLUTION INTRAVENOUS at 20:22

## 2023-10-30 RX ADMIN — PROPOFOL 140 MCG/KG/MIN: 10 INJECTION, EMULSION INTRAVENOUS at 11:52

## 2023-10-30 RX ADMIN — Medication 2000 MG: at 11:52

## 2023-10-30 ASSESSMENT — PAIN SCALES - GENERAL
PAINLEVEL_OUTOF10: 3
PAINLEVEL_OUTOF10: 4
PAINLEVEL_OUTOF10: 0
PAINLEVEL_OUTOF10: 0
PAINLEVEL_OUTOF10: 2
PAINLEVEL_OUTOF10: 0
PAINLEVEL_OUTOF10: 8

## 2023-10-30 ASSESSMENT — PAIN DESCRIPTION - ORIENTATION
ORIENTATION: MID
ORIENTATION: LEFT

## 2023-10-30 ASSESSMENT — PAIN DESCRIPTION - DESCRIPTORS
DESCRIPTORS: ACHING;CRAMPING
DESCRIPTORS: THROBBING
DESCRIPTORS: DISCOMFORT
DESCRIPTORS: ACHING

## 2023-10-30 ASSESSMENT — PAIN DESCRIPTION - LOCATION
LOCATION: CHEST;INCISION
LOCATION: INCISION

## 2023-10-30 ASSESSMENT — LIFESTYLE VARIABLES: SMOKING_STATUS: 0

## 2023-10-30 NOTE — ANESTHESIA POSTPROCEDURE EVALUATION
Department of Anesthesiology  Postprocedure Note    Patient: Ellen Johnson  MRN: [id number]  YOB: 1934  Date of evaluation: 10/30/2023      Procedure Summary     Date: 10/30/23 Room / Location: [facility] 1 ALL EVENTS / D CARDIAC CATH LAB    Anesthesia Start: 1140 Anesthesia Stop: 1352    Procedures:       Insert PPM biv multi      Ablation AV node Diagnosis:       Persistent atrial fibrillation (HCC)      (Persistent atrial fibrillation ([address]) [I48.19])    Providers: Namita Olivera MD Responsible Provider: Sincere Rivera MD    Anesthesia Type: TIVA ASA Status: 3          Anesthesia Type: TIVA    Dodie Phase I: Dodie Score: 10    Dodie Phase II:        Anesthesia Post Evaluation    Patient location during evaluation: bedside  Patient participation: complete - patient participated  Level of consciousness: awake and alert  Airway patency: patent  Nausea & Vomiting: no nausea  Complications: no  Cardiovascular status: hemodynamically stable  Respiratory status: acceptable, nonlabored ventilation and spontaneous ventilation  Hydration status: euvolemic

## 2023-10-30 NOTE — CARE COORDINATION
Pt presented for scheduled BIV/AVN today with Dr Jess Soto. PTA, pt indep with her ADLs. Lives alone at [address] [facility] [facility]. On [place]. Uses a rolator. PCP established. [facility] Medicare verified and able to afford home meds. No anticipated discharge needs identified, will remain available. 10/30/23 6541   Service Assessment   Patient Orientation Alert and Oriented   Cognition Alert   History Provided By Patient   Primary Caregiver Self   Support Systems Children;Family Members;Spiritism/[place] [employer];Friends/Neighbors   PCP Verified by CM Yes   Current Functional Level Independent in ADLs/IADLs   Can patient return to prior living arrangement Yes   Ability to make needs known: Good   Family able to assist with home care needs: Yes   Would you like for me to discuss the discharge plan with any other family members/significant others, and if so, who? No   Financial Resources Medicare   Community Resources Assisted Living   Social/Functional History   Lives With Alone   Type of Home Assisted living   Home Layout One level   [address] Help From Family   ADL Assistance Independent   Ambulation Assistance Needs assistance   Transfer Assistance Independent   Active  No   Occupation Retired   Discharge Planning   Current Services Prior To Admission None   Potential Assistance Needed N/A   DME Ordered? No   Potential Assistance Purchasing Medications No   Type of Home Care Services None   Patient expects to be discharged to: [address] Discharge   Transition of Care Consult (CM Consult) Discharge [address] Provided?  No   Mode of Transport at Discharge Other (see comment)  (Family)   Confirm Follow Up Transport Family

## 2023-10-30 NOTE — PROGRESS NOTES
Report received from  Cath Lab RN. Procedural findings communicated. Intra procedural  medication administration reviewed. Progression of care discussed. Patient received into [facility] 5 post procedure.      Incision site without bleeding or swelling yes    Dressing dry and intact yes    Patient instructed to limit movement to left upper and right lower extremity    Routine post procedural vital signs and site assessment initiated yes

## 2023-10-30 NOTE — PROGRESS NOTES
TRANSFER - OUT REPORT:    Verbal report given to [address] [place] [address] on Teresa Calix  being transferred to [phone] for routine progression of patient care       Report consisted of patient's Situation, Background, Assessment and   Recommendations(SBAR). Information from the following report(s) Nurse Handoff Report was reviewed with the receiving nurse. Lines:   Peripheral IV 10/30/23 Right Antecubital (Active)       Peripheral IV 10/30/23 Distal;Left;Posterior Forearm (Active)        Opportunity for questions and clarification was provided.       Patient transported with:  O2 @ 3lpm

## 2023-10-30 NOTE — PLAN OF CARE
Problem: Discharge Planning  Goal: Discharge to home or other facility with appropriate resources  Outcome: Progressing  Flowsheets (Taken 10/30/2023 8427)  Discharge to home or other facility with appropriate resources:   Identify barriers to discharge with patient and caregiver   Arrange for needed discharge resources and transportation as appropriate   Identify discharge learning needs (meds, wound care, etc)

## 2023-10-30 NOTE — PROGRESS NOTES
Patient received to [address] room # 17  Ambulatory from [facility]. Patient scheduled for biv/avn today with Dr Nyla Lynn. Procedure reviewed & questions answered, voiced good understanding consent obtained & placed on chart. All medications and medical history reviewed. Will prep patient per orders. Patient & family updated on plan of care. The patient has a fraility score of 5-MILDLY FRAIL, based on use of rollator and elderly state.

## 2023-10-30 NOTE — ANESTHESIA PRE PROCEDURE
Department of Anesthesiology  Preprocedure Note       Name:  Jeannie Sherman   Age:  80 y.o.  :  3/7/1934                                          MRN:  [id number]         Date:  10/30/2023      Surgeon: Stacie Nava):  Katie Wen MD    Procedure: Procedure(s): Insert PPM biv multi  Ablation AV node    Medications prior to admission:   Prior to Admission medications    Medication Sig Start Date End Date Taking?  Authorizing Provider   dilTIAZem (CARTIA XT) 180 MG extended release capsule Take 1 capsule by mouth every evening 23   Allan Mix MD   metoprolol succinate (TOPROL XL) 50 MG extended release tablet Take 1 tablet by mouth nightly 23   Allan Mix MD   pantoprazole (PROTONIX) 20 MG tablet Take 1 tablet by mouth every morning (before breakfast)  Patient not taking: Reported on 10/25/2023 6/27/23   Rani Dempsey MD   indapamide (LOZOL) 1.25 MG tablet Take 1 tablet by mouth daily 23   JAZMIN Ellis NP   denosumab (PROLIA) 60 MG/ML SOSY SC injection Inject 1 mL into the skin once yearly    Arabella Shah MD   apixaban (ELIQUIS) 2.5 MG TABS tablet Take 1 tablet by mouth 2 times daily Take 1/2 tablet BID 22   JAZMIN Ellis NP   bimatoprost (LUMIGAN) 0.01 % SOLN ophthalmic drops Place 1 drop into both eyes nightly    Arabella Shah MD   SYNTHROID 75 MCG tablet Take 1 tablet by mouth Daily 11/3/22   JAZMIN Ellis NP   acetaminophen (TYLENOL) 325 MG tablet Take 2 tablets by mouth every 6 hours as needed for Pain    Arabella Shah MD   b complex vitamins capsule Take 1 capsule by mouth daily    Arabella Shah MD   ibuprofen (ADVIL;MOTRIN) 200 MG tablet Take 0.5 tablets by mouth every 6 hours as needed for Pain  Patient not taking: Reported on 10/25/2023    Arabella Shah MD   Lifitegrast Eliza Fuentes) 5 % SOLN Place 1 drop into both eyes in the morning and at bedtime    Arabella Shah MD

## 2023-10-30 NOTE — PROGRESS NOTES
TRANSFER - IN REPORT:    Verbal report received from [place] on Sheron Underhill  being received from [facility] for routine progression of patient care      Report consisted of patient's Situation, Background, Assessment and   Recommendations(SBAR). Information from the following report(s) Index, Intake/Output, MAR, Recent Results, Med Rec Status, and Cardiac Rhythm V Paced  was reviewed with the receiving nurse. Opportunity for questions and clarification was provided. Assessment completed upon patient's arrival to unit and care assumed. Dual skin assessment completed with 2nd RN (rayshawn) and reveals the following: Sacrum and heels intact. R femoral and L subclavian site s/p PPM and ANV Ablation are clean,dry, and intact. Scattered scars and bruises noted.

## 2023-10-31 ENCOUNTER — TELEPHONE (OUTPATIENT)
Dept: CARDIOLOGY | Age: 88
End: 2023-10-31

## 2023-10-31 VITALS
HEART RATE: 81 BPM | DIASTOLIC BLOOD PRESSURE: 80 MMHG | BODY MASS INDEX: 21.83 KG/M2 | TEMPERATURE: 97.5 F | OXYGEN SATURATION: 93 % | WEIGHT: 131 LBS | RESPIRATION RATE: 17 BRPM | SYSTOLIC BLOOD PRESSURE: 131 MMHG | HEIGHT: 65 IN

## 2023-10-31 LAB
EKG ATRIAL RATE: 87 BPM
EKG DIAGNOSIS: NORMAL
EKG Q-T INTERVAL: 478 MS
EKG QRS DURATION: 168 MS
EKG QTC CALCULATION (BAZETT): 558 MS
EKG R AXIS: -47 DEGREES
EKG T AXIS: 0 DEGREES
EKG VENTRICULAR RATE: 82 BPM

## 2023-10-31 PROCEDURE — 2580000003 HC RX 258: Performed by: INTERNAL MEDICINE

## 2023-10-31 PROCEDURE — 93005 ELECTROCARDIOGRAM TRACING: CPT | Performed by: INTERNAL MEDICINE

## 2023-10-31 PROCEDURE — 93010 ELECTROCARDIOGRAM REPORT: CPT | Performed by: INTERNAL MEDICINE

## 2023-10-31 PROCEDURE — 6370000000 HC RX 637 (ALT 250 FOR IP): Performed by: INTERNAL MEDICINE

## 2023-10-31 PROCEDURE — G0378 HOSPITAL OBSERVATION PER HR: HCPCS

## 2023-10-31 PROCEDURE — 6360000002 HC RX W HCPCS: Performed by: INTERNAL MEDICINE

## 2023-10-31 RX ORDER — OXYCODONE HYDROCHLORIDE 5 MG/1
5 TABLET ORAL EVERY 8 HOURS PRN
Qty: 5 TABLET | Refills: 0 | Status: SHIPPED | OUTPATIENT
Start: 2023-10-31 | End: 2023-10-31 | Stop reason: SDUPTHER

## 2023-10-31 RX ORDER — OXYCODONE HYDROCHLORIDE 5 MG/1
5 TABLET ORAL EVERY 8 HOURS PRN
Qty: 5 TABLET | Refills: 0 | Status: SHIPPED | OUTPATIENT
Start: 2023-10-31 | End: 2023-11-03

## 2023-10-31 RX ADMIN — PANTOPRAZOLE SODIUM 40 MG: 40 TABLET, DELAYED RELEASE ORAL at 06:08

## 2023-10-31 RX ADMIN — ACETAMINOPHEN 650 MG: 325 TABLET ORAL at 03:59

## 2023-10-31 RX ADMIN — LEVOTHYROXINE SODIUM 75 MCG: 0.07 TABLET ORAL at 06:08

## 2023-10-31 RX ADMIN — CEFAZOLIN SODIUM 2000 MG: 100 INJECTION, POWDER, LYOPHILIZED, FOR SOLUTION INTRAVENOUS at 04:02

## 2023-10-31 RX ADMIN — OXYCODONE HYDROCHLORIDE 5 MG: 5 TABLET ORAL at 04:01

## 2023-10-31 ASSESSMENT — PAIN SCALES - GENERAL
PAINLEVEL_OUTOF10: 9
PAINLEVEL_OUTOF10: 0
PAINLEVEL_OUTOF10: 0

## 2023-10-31 ASSESSMENT — PAIN DESCRIPTION - ORIENTATION: ORIENTATION: LEFT

## 2023-10-31 ASSESSMENT — PAIN DESCRIPTION - LOCATION: LOCATION: SHOULDER;INCISION

## 2023-10-31 ASSESSMENT — PAIN DESCRIPTION - DESCRIPTORS: DESCRIPTORS: THROBBING

## 2023-10-31 NOTE — DISCHARGE INSTRUCTIONS
Patient has been instructed to keep affected arm below shoulder level for the next 4 weeks or until cleared by doctor. The arm sling should be worn while sleeping. The dressing will be removed at follow up. The incision site must be kept clean and dry. The patient may shower. Lotions, powders, or creams should be avoided as these can increase the risk of infection. The affected arm should not be used for any pushing, pulling, or lifting until cleared by doctor. Driving is prohibited until cleared by doctor in a follow up appointment. Any signs of infection including increased redness, suspicious drainage, or unexplained fever should be reported to the doctor immediately by calling [phone]. DEVICE IMPLANT FOLLOWUP INSTRUCTIONS  You will be discharged with an occlusive dressing over the incision site. This dressing will be removed at the 10 -14-day postop site check with the [employer] [place]. Your new device will be checked at that visit and all your device teaching will be given. Keep the incision site dry until your follow up. Use a hand-held shower or bath. Do not submerge or soak in pools or tubs for 6 weeks. If you are discharged with a prescription for antibiotics, please take the full prescription until it is gone. After your site check, you may shower and get the incision site wet. You may let soap and water run on the incision and pat dry. Please do not apply creams, lotions or powders on or near the incision. Mild bruising and swelling can be normal after implant and will resolve in a few weeks. Call the office at [phone] if you have any of the following:  -Signs of infection, such as fever over 100 F, drainage from the incision, redness, significant swelling, or warmth at the incision site.  -Significant pain around the site that gets worse.  Mild discomfort can be

## 2023-10-31 NOTE — PROGRESS NOTES
Discharge paperwork given to patient and patient son. Both verbalized understanding of education given and all questions/concerns addressed. IV and heart monitor removed.

## 2023-10-31 NOTE — CARE COORDINATION
Discharge order is in. Pt is discharging home today in stable condition. No discharge needs identified. Tx goals met. 10/31/23 2600 [address] Discharge   Transition of Care Consult (CM Consult) Discharge [facility] Discharge None    Resource Information Provided? No   Mode of Transport at Discharge Other (see comment)  (Family)   Confirm Follow Up Transport Family   Condition of Participation: Discharge Planning   The Patient and/or Patient Representative was provided with a Choice of Provider? Patient   The Patient and/Or Patient Representative agree with the Discharge Plan? Yes   Freedom of Choice list was provided with basic dialogue that supports the patient's individualized plan of care/goals, treatment preferences, and shares the quality data associated with the providers?   Yes

## 2023-10-31 NOTE — PLAN OF CARE
Problem: Pain  Goal: Verbalizes/displays adequate comfort level or baseline comfort level  Outcome: Adequate for Discharge     Problem: Discharge Planning  Goal: Discharge to home or other facility with appropriate resources  Outcome: Adequate for Discharge  Flowsheets (Taken 10/30/2023 2022 by Brain Figueroa RN)  Discharge to home or other facility with appropriate resources:   Identify barriers to discharge with patient and caregiver   Arrange for needed discharge resources and transportation as appropriate     Problem: Safety - Adult  Goal: Free from fall injury  Outcome: Adequate for Discharge     Problem: ABCDS Injury Assessment  Goal: Absence of physical injury  Outcome: Adequate for Discharge     Problem: Skin/Tissue Integrity  Goal: Absence of new skin breakdown  Description: 1. Monitor for areas of redness and/or skin breakdown  2. Assess vascular access sites hourly  3. Every 4-6 hours minimum:  Change oxygen saturation probe site  4. Every 4-6 hours:  If on nasal continuous positive airway pressure, respiratory therapy assess nares and determine need for appliance change or resting period.   Outcome: Adequate for Discharge

## 2023-11-01 ENCOUNTER — CARE COORDINATION (OUTPATIENT)
Dept: CARE COORDINATION | Facility: CLINIC | Age: 88
End: 2023-11-01

## 2023-11-01 NOTE — CARE COORDINATION
Care Transitions Initial Follow Up Call    Call within 2 business days of discharge: Yes    Patient Current Location:  Home: [address] [place] [address]    Care Transition Nurse contacted the family by telephone to perform post hospital discharge assessment. Verified name and  with family as identifiers. Provided introduction to self, and explanation of the Care Transition Nurse role. Patient: Dorian Bailey Patient : 3/7/1934   MRN: [id number]  Reason for Admission: BIV PPM implant   Discharge Date: 10/31/23 RARS: No data recorded    Last Discharge Facility       Date Complaint Diagnosis Description Type Department Provider    10/30/23  Hospital discharge follow-up . .. Admission (Discharged) Adelaida Montero MD            Was this an external facility discharge? No Discharge Facility: [facility]    Challenges to be reviewed by the provider   Additional needs identified to be addressed with provider: No  none               Method of communication with provider: none. Patent doing well after recent PPM placement. CTN discussed s/s to notify providers for and reviewed upcoming appts. Patient has PRN pain medicine and has all other medicines from d/c and reviewed medications to d/c after d/c. Patient was encouraged to use sling while sleeping and if needed, during the day for a reminder not to lift the effected extremity. Care Transition Nurse reviewed discharge instructions, medical action plan, and red flags with patient who verbalized understanding. The patient was given an opportunity to ask questions and does not have any further questions or concerns at this time. Were discharge instructions available to patient? Yes. Reviewed appropriate site of care based on symptoms and resources available to patient including: PCP  Specialist  When to call 911  CTN . The patient agrees to contact the PCP office for questions related to their healthcare.      Advance Care Planning:

## 2023-11-07 RX ORDER — PREDNISONE 20 MG/1
TABLET ORAL
Qty: 30 TABLET | Refills: 1 | OUTPATIENT
Start: 2023-11-07

## 2023-11-07 RX ORDER — PANTOPRAZOLE SODIUM 20 MG/1
TABLET, DELAYED RELEASE ORAL
Qty: 30 TABLET | Refills: 1 | OUTPATIENT
Start: 2023-11-07

## 2023-11-08 ENCOUNTER — CARE COORDINATION (OUTPATIENT)
Dept: CARE COORDINATION | Facility: CLINIC | Age: 88
End: 2023-11-08

## 2023-11-08 NOTE — CARE COORDINATION
family agrees to contact the PCP office for questions related to their healthcare. Advance Care Planning:   not on file. Patients top risk factors for readmission: medical condition-PPM placed recently  Interventions to address risk factors: Scheduled appointment with Specialist-11/13 device check    Offered patient enrollment in the Remote Patient Monitoring (RPM) program for in-home monitoring: NA.     Care Transitions Subsequent and Final Call    Subsequent and Final Calls  Care Transitions Interventions              Disease Specific Clinic: Completed            Other Interventions:             Care Transition Nurse provided contact information for future needs. Plan for follow-up call in 10-14 days based on severity of symptoms and risk factors. Plan for next call: symptom management-follow up after device check and for any acute needs.      Moira Mari RN

## 2023-11-13 ENCOUNTER — NURSE ONLY (OUTPATIENT)
Age: 88
End: 2023-11-13

## 2023-11-13 DIAGNOSIS — I49.5 TACHY-BRADY SYNDROME (HCC): Primary | ICD-10-CM

## 2023-11-14 DIAGNOSIS — E55.9 VITAMIN D DEFICIENCY: ICD-10-CM

## 2023-11-14 DIAGNOSIS — D47.2 MGUS (MONOCLONAL GAMMOPATHY OF UNKNOWN SIGNIFICANCE): Primary | ICD-10-CM

## 2023-11-14 ASSESSMENT — ENCOUNTER SYMPTOMS
SHORTNESS OF BREATH: 0
VOICE CHANGE: 0
DIARRHEA: 0
TROUBLE SWALLOWING: 0
CONSTIPATION: 0
VOMITING: 0
NAUSEA: 0
ABDOMINAL DISTENTION: 0
BLOOD IN STOOL: 0
HEMOPTYSIS: 0
SORE THROAT: 0
WHEEZING: 0
CHEST TIGHTNESS: 0
SCLERAL ICTERUS: 0
ABDOMINAL PAIN: 0

## 2023-11-14 NOTE — PROGRESS NOTES
[address] [facility] Hematology and Oncology: Established patient - follow up     Chief Complaint   Patient presents with    Follow-up     Chronological Events:  Reason for Referral: Monoclonal gammopathy  Referring Provider: Ileana Villavicencio MD  Primary Care Provider: Arian Sweet MD  Family History of Cancer/Hematologic Disorders: Family history is significant for sister with breast cancer at the age of 80. Presenting Symptoms: No relevant physical symptoms reported. History of Present Illness:  Ms. Nena Luke is a 80 y.o. female who presents today for follow up regarding MGUS. The past medical history is significant for vitamin D deficiency, PAF on AC, osteopenia, osteoarthritis,  occlusion and stenosis of carotid artery, HLD, mitral valve disorder, malignant neoplasm of rectosigmoid junction, IBS, HTN,  ovarian cancer, colon polyps, Graves disease, GERD, diverticulosis, bilateral carotid artery stenosis, aortic regurgitation, anxiety disorder, total colectomy, tonsillectomy, thyroidectomy, ROSALIA/BSO,  small bowel resection, heart catheterization, cyst I&D, cataract removal, vitrectomy, and orthopedic surgery. She was evaluated in consultation by Rheumatologist, Dr. Maru Breaux, on 11/4/21 after being referred by her PCP for evaluation of osteoporosis. Review of systems and physical exam were unremarkable. She was treated with Prolia with her first injection on 11/4/21 with plans for 60 mg Prolia injection every 6 months. 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 Ms. Nena Luke was subsequently referred to [facility], per Rheumatology, for hematology evaluation and treatment of  monoclonal gammopathy.      We had a lengthy discussion regarding the pathophysiology of plasma cell disorders going over the spectrum of plasma cell diseases utilizing visual aids on the white

## 2023-11-17 ENCOUNTER — HOSPITAL ENCOUNTER (OUTPATIENT)
Dept: LAB | Age: 88
End: 2023-11-17
Payer: MEDICARE

## 2023-11-17 ENCOUNTER — OFFICE VISIT (OUTPATIENT)
Dept: ONCOLOGY | Age: 88
End: 2023-11-17
Payer: MEDICARE

## 2023-11-17 VITALS
HEIGHT: 65 IN | SYSTOLIC BLOOD PRESSURE: 144 MMHG | WEIGHT: 132.6 LBS | BODY MASS INDEX: 22.09 KG/M2 | DIASTOLIC BLOOD PRESSURE: 77 MMHG | HEART RATE: 79 BPM | OXYGEN SATURATION: 96 % | TEMPERATURE: 97.7 F | RESPIRATION RATE: 18 BRPM

## 2023-11-17 DIAGNOSIS — D47.2 MGUS (MONOCLONAL GAMMOPATHY OF UNKNOWN SIGNIFICANCE): ICD-10-CM

## 2023-11-17 DIAGNOSIS — E55.9 VITAMIN D DEFICIENCY: ICD-10-CM

## 2023-11-17 DIAGNOSIS — D47.2 MGUS (MONOCLONAL GAMMOPATHY OF UNKNOWN SIGNIFICANCE): Primary | ICD-10-CM

## 2023-11-17 LAB
25(OH)D3 SERPL-MCNC: 61.1 NG/ML (ref 30–100)
ALBUMIN SERPL-MCNC: 3.2 G/DL (ref 3.2–4.6)
ALBUMIN/GLOB SERPL: 0.8 (ref 0.4–1.6)
ALP SERPL-CCNC: 89 U/L (ref 50–136)
ALT SERPL-CCNC: 16 U/L (ref 12–65)
ANION GAP SERPL CALC-SCNC: 3 MMOL/L (ref 2–11)
AST SERPL-CCNC: 21 U/L (ref 15–37)
BASOPHILS # BLD: 0 K/UL (ref 0–0.2)
BASOPHILS NFR BLD: 1 % (ref 0–2)
BILIRUB SERPL-MCNC: 0.5 MG/DL (ref 0.2–1.1)
BUN SERPL-MCNC: 19 MG/DL (ref 8–23)
CALCIUM SERPL-MCNC: 9.5 MG/DL (ref 8.3–10.4)
CHLORIDE SERPL-SCNC: 105 MMOL/L (ref 101–110)
CO2 SERPL-SCNC: 30 MMOL/L (ref 21–32)
CREAT SERPL-MCNC: 0.9 MG/DL (ref 0.6–1)
DIFFERENTIAL METHOD BLD: ABNORMAL
EOSINOPHIL # BLD: 0.1 K/UL (ref 0–0.8)
EOSINOPHIL NFR BLD: 1 % (ref 0.5–7.8)
ERYTHROCYTE [DISTWIDTH] IN BLOOD BY AUTOMATED COUNT: 14.2 % (ref 11.9–14.6)
GLOBULIN SER CALC-MCNC: 3.9 G/DL (ref 2.8–4.5)
GLUCOSE SERPL-MCNC: 116 MG/DL (ref 65–100)
HCT VFR BLD AUTO: 39.6 % (ref 35.8–46.3)
HGB BLD-MCNC: 12.4 G/DL (ref 11.7–15.4)
IMM GRANULOCYTES # BLD AUTO: 0 K/UL (ref 0–0.5)
IMM GRANULOCYTES NFR BLD AUTO: 1 % (ref 0–5)
LYMPHOCYTES # BLD: 0.9 K/UL (ref 0.5–4.6)
LYMPHOCYTES NFR BLD: 16 % (ref 13–44)
MCH RBC QN AUTO: 30.2 PG (ref 26.1–32.9)
MCHC RBC AUTO-ENTMCNC: 31.3 G/DL (ref 31.4–35)
MCV RBC AUTO: 96.6 FL (ref 82–102)
MONOCYTES # BLD: 0.5 K/UL (ref 0.1–1.3)
MONOCYTES NFR BLD: 9 % (ref 4–12)
NEUTS SEG # BLD: 4.2 K/UL (ref 1.7–8.2)
NEUTS SEG NFR BLD: 72 % (ref 43–78)
NRBC # BLD: 0 K/UL (ref 0–0.2)
PLATELET # BLD AUTO: 280 K/UL (ref 150–450)
PMV BLD AUTO: 9 FL (ref 9.4–12.3)
POTASSIUM SERPL-SCNC: 3.9 MMOL/L (ref 3.5–5.1)
PROT SERPL-MCNC: 7.1 G/DL (ref 6.3–8.2)
RBC # BLD AUTO: 4.1 M/UL (ref 4.05–5.2)
SODIUM SERPL-SCNC: 138 MMOL/L (ref 133–143)
WBC # BLD AUTO: 5.8 K/UL (ref 4.3–11.1)

## 2023-11-17 PROCEDURE — 82306 VITAMIN D 25 HYDROXY: CPT

## 2023-11-17 PROCEDURE — 80053 COMPREHEN METABOLIC PANEL: CPT

## 2023-11-17 PROCEDURE — 99215 OFFICE O/P EST HI 40 MIN: CPT | Performed by: INTERNAL MEDICINE

## 2023-11-17 PROCEDURE — 82784 ASSAY IGA/IGD/IGG/IGM EACH: CPT

## 2023-11-17 PROCEDURE — 84156 ASSAY OF PROTEIN URINE: CPT

## 2023-11-17 PROCEDURE — 1036F TOBACCO NON-USER: CPT | Performed by: INTERNAL MEDICINE

## 2023-11-17 PROCEDURE — 1123F ACP DISCUSS/DSCN MKR DOCD: CPT | Performed by: INTERNAL MEDICINE

## 2023-11-17 PROCEDURE — G8484 FLU IMMUNIZE NO ADMIN: HCPCS | Performed by: INTERNAL MEDICINE

## 2023-11-17 PROCEDURE — G8420 CALC BMI NORM PARAMETERS: HCPCS | Performed by: INTERNAL MEDICINE

## 2023-11-17 PROCEDURE — 84166 PROTEIN E-PHORESIS/URINE/CSF: CPT

## 2023-11-17 PROCEDURE — 36415 COLL VENOUS BLD VENIPUNCTURE: CPT

## 2023-11-17 PROCEDURE — 84165 PROTEIN E-PHORESIS SERUM: CPT

## 2023-11-17 PROCEDURE — G8427 DOCREV CUR MEDS BY ELIG CLIN: HCPCS | Performed by: INTERNAL MEDICINE

## 2023-11-17 PROCEDURE — 83521 IG LIGHT CHAINS FREE EACH: CPT

## 2023-11-17 PROCEDURE — 1090F PRES/ABSN URINE INCON ASSESS: CPT | Performed by: INTERNAL MEDICINE

## 2023-11-17 PROCEDURE — 85025 COMPLETE CBC W/AUTO DIFF WBC: CPT

## 2023-11-17 PROCEDURE — 86334 IMMUNOFIX E-PHORESIS SERUM: CPT

## 2023-11-17 ASSESSMENT — PATIENT HEALTH QUESTIONNAIRE - PHQ9
SUM OF ALL RESPONSES TO PHQ QUESTIONS 1-9: 0
SUM OF ALL RESPONSES TO PHQ QUESTIONS 1-9: 0
SUM OF ALL RESPONSES TO PHQ9 QUESTIONS 1 & 2: 0
SUM OF ALL RESPONSES TO PHQ QUESTIONS 1-9: 0
1. LITTLE INTEREST OR PLEASURE IN DOING THINGS: 0
2. FEELING DOWN, DEPRESSED OR HOPELESS: 0
SUM OF ALL RESPONSES TO PHQ QUESTIONS 1-9: 0

## 2023-11-17 NOTE — PATIENT INSTRUCTIONS
Patient Instructions from Today's Visit    Reason for Visit:  Follow Up    Diagnosis Information:  [email or web address]    Plan:  Labs reviewed. Symptoms reviewed. Recommend Gas-X, as needed for belching. Continue Vitamin D. Increase fluid intake. Follow Up:  3-4 months.     Recent Lab Results:  Hospital Outpatient Visit on 11/17/2023   Component Date Value Ref Range Status    WBC 11/17/2023 5.8  4.3 - 11.1 K/uL Final    RBC 11/17/2023 4.10  4.05 - 5.2 M/uL Final    Hemoglobin 11/17/2023 12.4  11.7 - 15.4 g/dL Final    Hematocrit 11/17/2023 39.6  35.8 - 46.3 % Final    MCV 11/17/2023 96.6  82.0 - 102.0 FL Final    MCH 11/17/2023 30.2  26.1 - 32.9 PG Final    MCHC 11/17/2023 31.3 (L)  31.4 - 35.0 g/dL Final    RDW 11/17/2023 14.2  11.9 - 14.6 % Final    Platelets 96/72/1531 280  150 - 450 K/uL Final    MPV 11/17/2023 9.0 (L)  9.4 - 12.3 FL Final    nRBC 11/17/2023 0.00  0.0 - 0.2 K/uL Final    **Note: Absolute NRBC parameter is now reported with Hemogram**    Neutrophils % 11/17/2023 72  43 - 78 % Final    Lymphocytes % 11/17/2023 16  13 - 44 % Final    Monocytes % 11/17/2023 9  4.0 - 12.0 % Final    Eosinophils % 11/17/2023 1  0.5 - 7.8 % Final    Basophils % 11/17/2023 1  0.0 - 2.0 % Final    Immature Granulocytes 11/17/2023 1  0.0 - 5.0 % Final    Neutrophils Absolute 11/17/2023 4.2  1.7 - 8.2 K/UL Final    Lymphocytes Absolute 11/17/2023 0.9  0.5 - 4.6 K/UL Final    Monocytes Absolute 11/17/2023 0.5  0.1 - 1.3 K/UL Final    Eosinophils Absolute 11/17/2023 0.1  0.0 - 0.8 K/UL Final    Basophils Absolute 11/17/2023 0.0  0.0 - 0.2 K/UL Final    Absolute Immature Granulocyte 11/17/2023 0.0  0.0 - 0.5 K/UL Final    Differential Type 11/17/2023 AUTOMATED    Final    Sodium 11/17/2023 138  133 - 143 mmol/L Final    Potassium 11/17/2023 3.9  3.5 - 5.1 mmol/L Final    Chloride 11/17/2023 105  101 - 110 mmol/L Final    CO2 11/17/2023 30

## 2023-11-20 LAB
KAPPA LC FREE SER-MCNC: 991.7 MG/L (ref 3.3–19.4)
KAPPA LC FREE/LAMBDA FREE SER: 103.3 (ref 0.26–1.65)
LAMBDA LC FREE SERPL-MCNC: 9.6 MG/L (ref 5.7–26.3)

## 2023-11-21 LAB
ALBUMIN MFR UR ELPH: 11.5 %
ALPHA1 GLOB MFR UR ELPH: 3.7 %
ALPHA2 GLOB 24H MFR UR ELPH: 5.6 %
B-GLOBULIN 24H MFR UR ELPH: 27 %
GAMMA GLOB 24H MFR UR ELPH: 52.2 %
LABORATORY COMMENT REPORT: ABNORMAL
M PROTEIN MFR UR ELPH: 42.6 %
PROT UR-MCNC: 27.6 MG/DL

## 2023-11-22 ENCOUNTER — CARE COORDINATION (OUTPATIENT)
Dept: CARE COORDINATION | Facility: CLINIC | Age: 88
End: 2023-11-22

## 2023-11-22 NOTE — CARE COORDINATION
Patient has graduated from the Care Transitions program on 11/22. Patient/family has the ability to self-manage at this time. Patient has no further care management needs, no referral to the [facility] team for further management. CTN to remain on care team for remainder of 30 day episode. Patient has healed well from PM placement and has been compliant with medications and appts. Patient has sitter during day and family assistance otherwise. Patient has Care Transition Nurse's contact information for any further questions, concerns, or needs.   Patients upcoming visits:    Future Appointments   Date Time Provider [address]   12/20/2023  2:15 PM Johnathon Mix MD [facility]   2/22/2024  9:45 AM Annabella Porter MD [facility]   2/22/2024  9:45 AM NIA CAUSEY/CLEMSON DEVICE [address] [facility] AMB   3/22/2024 12:30 PM PERIPHERAL GCCO[facility]   3/22/2024  1:30 PM Joshua Roberto MD [facility]   5/16/2024  2:00 PM Deny Pineda MD [facility] AM[facility]

## 2023-11-27 DIAGNOSIS — D47.2 MGUS (MONOCLONAL GAMMOPATHY OF UNKNOWN SIGNIFICANCE): Primary | ICD-10-CM

## 2023-11-27 LAB
ALBUMIN SERPL ELPH-MCNC: 3.9 G/DL (ref 2.9–4.4)
ALBUMIN/GLOB SERPL: 1.2 (ref 0.7–1.7)
ALPHA1 GLOB SERPL ELPH-MCNC: 0.3 G/DL (ref 0–0.4)
ALPHA2 GLOB SERPL ELPH-MCNC: 0.8 G/DL (ref 0.4–1)
B-GLOBULIN SERPL ELPH-MCNC: 1.1 G/DL (ref 0.7–1.3)
GAMMA GLOB SERPL ELPH-MCNC: 1.2 G/DL (ref 0.4–1.8)
GLOBULIN SER-MCNC: 3.4 G/DL (ref 2.2–3.9)
IGA SERPL-MCNC: 52 MG/DL (ref 64–422)
IGG SERPL-MCNC: 1246 MG/DL (ref 586–1602)
IGM SERPL-MCNC: 21 MG/DL (ref 26–217)
INTERPRETATION SERPL IEP-IMP: ABNORMAL
M PROTEIN SERPL ELPH-MCNC: 0.9 G/DL
PROT SERPL-MCNC: 7.3 G/DL (ref 6–8.5)

## 2023-11-29 ENCOUNTER — HOSPITAL ENCOUNTER (OUTPATIENT)
Dept: LAB | Age: 88
Discharge: HOME | End: 2023-12-02
Payer: MEDICARE

## 2023-11-29 DIAGNOSIS — D47.2 MGUS (MONOCLONAL GAMMOPATHY OF UNKNOWN SIGNIFICANCE): ICD-10-CM

## 2023-11-29 PROCEDURE — 36415 COLL VENOUS BLD VENIPUNCTURE: CPT

## 2023-11-29 PROCEDURE — 83521 IG LIGHT CHAINS FREE EACH: CPT

## 2023-11-29 PROCEDURE — 84166 PROTEIN E-PHORESIS/URINE/CSF: CPT

## 2023-11-29 PROCEDURE — 84165 PROTEIN E-PHORESIS SERUM: CPT

## 2023-11-29 PROCEDURE — 86334 IMMUNOFIX E-PHORESIS SERUM: CPT

## 2023-11-29 PROCEDURE — 82784 ASSAY IGA/IGD/IGG/IGM EACH: CPT

## 2023-11-29 PROCEDURE — 84156 ASSAY OF PROTEIN URINE: CPT

## 2023-11-29 PROCEDURE — 84155 ASSAY OF PROTEIN SERUM: CPT

## 2023-11-30 LAB
KAPPA LC FREE SER-MCNC: 877.3 MG/L (ref 3.3–19.4)
KAPPA LC FREE/LAMBDA FREE SER: 77.64 (ref 0.26–1.65)
LAMBDA LC FREE SERPL-MCNC: 11.3 MG/L (ref 5.7–26.3)

## 2023-12-01 ENCOUNTER — CARE COORDINATION (OUTPATIENT)
Dept: CARE COORDINATION | Facility: CLINIC | Age: 88
End: 2023-12-01

## 2023-12-01 LAB
ALBUMIN MFR UR ELPH: 13.8 %
ALPHA1 GLOB MFR UR ELPH: 1.9 %
ALPHA2 GLOB 24H MFR UR ELPH: 4.4 %
B-GLOBULIN 24H MFR UR ELPH: 25.3 %
GAMMA GLOB 24H MFR UR ELPH: 54.6 %
LABORATORY COMMENT REPORT: ABNORMAL
M PROTEIN MFR UR ELPH: 49.1 %
PROT UR-MCNC: 34.7 MG/DL

## 2023-12-04 LAB
ALBUMIN SERPL ELPH-MCNC: 3.2 G/DL (ref 2.9–4.4)
ALBUMIN/GLOB SERPL: 1.1 (ref 0.7–1.7)
ALPHA1 GLOB SERPL ELPH-MCNC: 0.3 G/DL (ref 0–0.4)
ALPHA2 GLOB SERPL ELPH-MCNC: 0.8 G/DL (ref 0.4–1)
B-GLOBULIN SERPL ELPH-MCNC: 0.9 G/DL (ref 0.7–1.3)
GAMMA GLOB SERPL ELPH-MCNC: 1.1 G/DL (ref 0.4–1.8)
GLOBULIN SER-MCNC: 3.1 G/DL (ref 2.2–3.9)
IGA SERPL-MCNC: 57 MG/DL (ref 64–422)
IGG SERPL-MCNC: 1231 MG/DL (ref 586–1602)
IGM SERPL-MCNC: 20 MG/DL (ref 26–217)
INTERPRETATION SERPL IEP-IMP: ABNORMAL
M PROTEIN SERPL ELPH-MCNC: 0.8 G/DL
PROT SERPL-MCNC: 6.3 G/DL (ref 6–8.5)

## 2023-12-20 PROBLEM — Z95.0 PRESENCE OF BIVENTRICULAR CARDIAC PACEMAKER: Status: ACTIVE | Noted: 2023-12-20

## 2023-12-20 PROBLEM — I48.19 PERSISTENT ATRIAL FIBRILLATION (HCC): Status: RESOLVED | Noted: 2023-10-25 | Resolved: 2023-12-20

## 2024-01-02 ENCOUNTER — TELEPHONE (OUTPATIENT)
Dept: ONCOLOGY | Age: 89
End: 2024-01-02

## 2024-01-02 NOTE — TELEPHONE ENCOUNTER
Physician provider: Krista Mueller MD  Reason for today's call: Lab results and appt   Last office visit: n/a    Pt's Daughter-in-law asks for an earlier lab appt on 03.22.24 to have results avail for OV review. She also asks to review the results of most recent labs done stating she will send message via [facility] as well. Daughter-in-law stated it was very difficult to navigate through the call option choices.

## 2024-02-12 ENCOUNTER — TELEPHONE (OUTPATIENT)
Age: 89
End: 2024-02-12

## 2024-02-16 ENCOUNTER — HOSPITAL ENCOUNTER (OUTPATIENT)
Dept: MRI IMAGING | Age: 89
End: 2024-02-16
Attending: ANESTHESIOLOGY
Payer: MEDICARE

## 2024-02-16 DIAGNOSIS — G89.18 WORSENING OF NECK PAIN FOLLOWING SURGERY: ICD-10-CM

## 2024-02-16 DIAGNOSIS — M54.16 LUMBAR RADICULOPATHY: ICD-10-CM

## 2024-02-16 PROCEDURE — 6360000004 HC RX CONTRAST MEDICATION: Performed by: ANESTHESIOLOGY

## 2024-02-16 PROCEDURE — A9579 GAD-BASE MR CONTRAST NOS,1ML: HCPCS | Performed by: ANESTHESIOLOGY

## 2024-02-16 PROCEDURE — 72197 MRI PELVIS W/O & W/DYE: CPT

## 2024-02-16 PROCEDURE — 72158 MRI LUMBAR SPINE W/O & W/DYE: CPT

## 2024-02-16 RX ADMIN — GADOTERIDOL 12 ML: 279.3 INJECTION, SOLUTION INTRAVENOUS at 13:57

## 2024-02-20 ENCOUNTER — TELEPHONE (OUTPATIENT)
Age: 89
End: 2024-02-20

## 2024-02-20 NOTE — TELEPHONE ENCOUNTER
Called and informed pt of Dr. Mix's response, would limit to once or twice a week.  Pt verb understanding.

## 2024-02-20 NOTE — TELEPHONE ENCOUNTER
Called s/w pt.  Pt asking if ok to take Advil for sciatica pain on occasion?    Reports has tried taking Tylenol but does not help.     Pt takes Eliquis 2.5mg 1 BID.

## 2024-02-20 NOTE — TELEPHONE ENCOUNTER
Called number listed in telephone note.  S/w daughter in law, Sully.  She was not aware that pt had call our office. She gave me the pt's number.  I called/LMOM to call triage nurse.

## 2024-02-22 ENCOUNTER — OFFICE VISIT (OUTPATIENT)
Age: 89
End: 2024-02-22
Payer: MEDICARE

## 2024-02-22 ENCOUNTER — NURSE ONLY (OUTPATIENT)
Age: 89
End: 2024-02-22

## 2024-02-22 VITALS
HEIGHT: 64 IN | SYSTOLIC BLOOD PRESSURE: 132 MMHG | HEART RATE: 80 BPM | DIASTOLIC BLOOD PRESSURE: 88 MMHG | BODY MASS INDEX: 22.74 KG/M2 | WEIGHT: 133.2 LBS

## 2024-02-22 DIAGNOSIS — I48.19 PERSISTENT ATRIAL FIBRILLATION (HCC): Primary | ICD-10-CM

## 2024-02-22 DIAGNOSIS — I44.2 COMPLETE AV BLOCK DUE TO AV NODAL ABLATION (HCC): Primary | ICD-10-CM

## 2024-02-22 DIAGNOSIS — I97.190 COMPLETE AV BLOCK DUE TO AV NODAL ABLATION (HCC): Primary | ICD-10-CM

## 2024-02-22 PROCEDURE — 1036F TOBACCO NON-USER: CPT | Performed by: INTERNAL MEDICINE

## 2024-02-22 PROCEDURE — G8427 DOCREV CUR MEDS BY ELIG CLIN: HCPCS | Performed by: INTERNAL MEDICINE

## 2024-02-22 PROCEDURE — 99214 OFFICE O/P EST MOD 30 MIN: CPT | Performed by: INTERNAL MEDICINE

## 2024-02-22 PROCEDURE — 1123F ACP DISCUSS/DSCN MKR DOCD: CPT | Performed by: INTERNAL MEDICINE

## 2024-02-22 PROCEDURE — 1090F PRES/ABSN URINE INCON ASSESS: CPT | Performed by: INTERNAL MEDICINE

## 2024-02-22 PROCEDURE — G8420 CALC BMI NORM PARAMETERS: HCPCS | Performed by: INTERNAL MEDICINE

## 2024-02-22 PROCEDURE — G8484 FLU IMMUNIZE NO ADMIN: HCPCS | Performed by: INTERNAL MEDICINE

## 2024-02-22 NOTE — PROGRESS NOTES
as needed      mometasone (ELOCON) 0.1 % lotion APPLY TO SCALP TWICE DAILY FOR ITCH AS NEEDED       No current facility-administered medications for this visit.     No Known Allergies  [unfilled]    Past Medical History:   Diagnosis Date    Anxiety disorder     Aortic regurgitation     Asymptomatic bilateral carotid artery stenosis     Atrial fibrillation (HCC) 2019    Diverticulosis of large intestine     GERD (gastroesophageal reflux disease)     Graves disease     History of colon polyps     History of herpes zoster     History of ovarian cancer     Hypertension     Irritable bowel syndrome     Malignant neoplasm of rectosigmoid junction (HCC)     Menopause     Mitral valve disorders(424.0)     Mixed hyperlipidemia     Occlusion and stenosis of carotid artery without mention of cerebral infarction     Osteoarthritis     Osteopenia     Paroxysmal atrial fibrillation (HCC)     Postmenopausal atrophic vaginitis     Primary hypothyroidism     Thyroid nodule 11/03/2022    Vaginal atrophy     Vitamin D deficiency      Past Surgical History:   Procedure Laterality Date    ANTERIOR CRUCIATE LIGAMENT REPAIR Left 2014    CARDIAC CATHETERIZATION  12/9/2015    CARPAL TUNNEL RELEASE Left 2009    CATARACT REMOVAL Right 2/2013    intraocular lens implant    COLONOSCOPY  12/2014    dr sullivan    CYST INCISION AND DRAINAGE Left     swollen tissue around knee replacement    EP DEVICE PROCEDURE N/A 10/30/2023    Insert PPM biv multi performed by Andre Garcia MD at [facility] CARDIAC CATH LAB    EP DEVICE PROCEDURE N/A 10/30/2023    Ablation AV node performed by Andre Garcia MD at [facility] CARDIAC CATH LAB    EYE SURGERY      HYSTERECTOMY, TOTAL ABDOMINAL (CERVIX REMOVED)      JOINT REPLACEMENT      KNEE ARTHROPLASTY      SMALL INTESTINE SURGERY  12/2014 and 1/2015    ROSALIA AND BSO (CERVIX REMOVED)  1977    ROSALIA AND BSO (CERVIX REMOVED)  1977    THYROIDECTOMY  08/24/2016    TONSILLECTOMY      Age 27    TOTAL COLECTOMY      TOTAL  Cheek-To-Nose Interpolation Flap Text: A decision was made to reconstruct the defect utilizing an interpolation axial flap and a staged reconstruction.  A telfa template was made of the defect.  This telfa template was then used to outline the Cheek-To-Nose Interpolation flap.  The donor area for the pedicle flap was then injected with anesthesia.  The flap was excised through the skin and subcutaneous tissue down to the layer of the underlying musculature.  The interpolation flap was carefully excised within this deep plane to maintain its blood supply.  The edges of the donor site were undermined.   The donor site was closed in a primary fashion.  The pedicle was then rotated into position and sutured.  Once the tube was sutured into place, adequate blood supply was confirmed with blanching and refill.  The pedicle was then wrapped with xeroform gauze and dressed appropriately with a telfa and gauze bandage to ensure continued blood supply and protect the attached pedicle.

## 2024-03-08 ENCOUNTER — HOSPITAL ENCOUNTER (OUTPATIENT)
Dept: LAB | Age: 89
End: 2024-03-08
Payer: MEDICARE

## 2024-03-08 DIAGNOSIS — E55.9 VITAMIN D DEFICIENCY: ICD-10-CM

## 2024-03-08 DIAGNOSIS — D47.2 MGUS (MONOCLONAL GAMMOPATHY OF UNKNOWN SIGNIFICANCE): Primary | ICD-10-CM

## 2024-03-08 DIAGNOSIS — D47.2 MGUS (MONOCLONAL GAMMOPATHY OF UNKNOWN SIGNIFICANCE): ICD-10-CM

## 2024-03-08 LAB
25(OH)D3 SERPL-MCNC: 58.3 NG/ML (ref 30–100)
ALBUMIN SERPL-MCNC: 3.3 G/DL (ref 3.2–4.6)
ALBUMIN/GLOB SERPL: 0.8 (ref 0.4–1.6)
ALP SERPL-CCNC: 100 U/L (ref 50–136)
ALT SERPL-CCNC: 16 U/L (ref 12–65)
ANION GAP SERPL CALC-SCNC: 5 MMOL/L (ref 2–11)
AST SERPL-CCNC: 21 U/L (ref 15–37)
BASOPHILS # BLD: 0 K/UL (ref 0–0.2)
BASOPHILS NFR BLD: 1 % (ref 0–2)
BILIRUB SERPL-MCNC: 0.9 MG/DL (ref 0.2–1.1)
BUN SERPL-MCNC: 22 MG/DL (ref 8–23)
CALCIUM SERPL-MCNC: 10.4 MG/DL (ref 8.3–10.4)
CHLORIDE SERPL-SCNC: 103 MMOL/L (ref 103–113)
CO2 SERPL-SCNC: 28 MMOL/L (ref 21–32)
CREAT SERPL-MCNC: 0.9 MG/DL (ref 0.6–1)
DIFFERENTIAL METHOD BLD: ABNORMAL
EOSINOPHIL # BLD: 0 K/UL (ref 0–0.8)
EOSINOPHIL NFR BLD: 0 % (ref 0.5–7.8)
ERYTHROCYTE [DISTWIDTH] IN BLOOD BY AUTOMATED COUNT: 15.6 % (ref 11.9–14.6)
GLOBULIN SER CALC-MCNC: 4.2 G/DL (ref 2.8–4.5)
GLUCOSE SERPL-MCNC: 98 MG/DL (ref 65–100)
HCT VFR BLD AUTO: 41 % (ref 35.8–46.3)
HGB BLD-MCNC: 13 G/DL (ref 11.7–15.4)
IMM GRANULOCYTES # BLD AUTO: 0 K/UL (ref 0–0.5)
IMM GRANULOCYTES NFR BLD AUTO: 1 % (ref 0–5)
LYMPHOCYTES # BLD: 1.1 K/UL (ref 0.5–4.6)
LYMPHOCYTES NFR BLD: 19 % (ref 13–44)
MCH RBC QN AUTO: 29.1 PG (ref 26.1–32.9)
MCHC RBC AUTO-ENTMCNC: 31.7 G/DL (ref 31.4–35)
MCV RBC AUTO: 91.7 FL (ref 82–102)
MONOCYTES # BLD: 0.6 K/UL (ref 0.1–1.3)
MONOCYTES NFR BLD: 10 % (ref 4–12)
NEUTS SEG # BLD: 4.1 K/UL (ref 1.7–8.2)
NEUTS SEG NFR BLD: 70 % (ref 43–78)
NRBC # BLD: 0 K/UL (ref 0–0.2)
PLATELET # BLD AUTO: 291 K/UL (ref 150–450)
PMV BLD AUTO: 9 FL (ref 9.4–12.3)
POTASSIUM SERPL-SCNC: 4 MMOL/L (ref 3.5–5.1)
PROT SERPL-MCNC: 7.5 G/DL (ref 6.3–8.2)
RBC # BLD AUTO: 4.47 M/UL (ref 4.05–5.2)
SODIUM SERPL-SCNC: 136 MMOL/L (ref 136–146)
WBC # BLD AUTO: 5.9 K/UL (ref 4.3–11.1)

## 2024-03-08 PROCEDURE — 85025 COMPLETE CBC W/AUTO DIFF WBC: CPT

## 2024-03-08 PROCEDURE — 82306 VITAMIN D 25 HYDROXY: CPT

## 2024-03-08 PROCEDURE — 82784 ASSAY IGA/IGD/IGG/IGM EACH: CPT

## 2024-03-08 PROCEDURE — 83521 IG LIGHT CHAINS FREE EACH: CPT

## 2024-03-08 PROCEDURE — 84166 PROTEIN E-PHORESIS/URINE/CSF: CPT

## 2024-03-08 PROCEDURE — 84156 ASSAY OF PROTEIN URINE: CPT

## 2024-03-08 PROCEDURE — 80053 COMPREHEN METABOLIC PANEL: CPT

## 2024-03-08 PROCEDURE — 86334 IMMUNOFIX E-PHORESIS SERUM: CPT

## 2024-03-08 PROCEDURE — 36415 COLL VENOUS BLD VENIPUNCTURE: CPT

## 2024-03-08 PROCEDURE — 84165 PROTEIN E-PHORESIS SERUM: CPT

## 2024-03-11 ENCOUNTER — OFFICE VISIT (OUTPATIENT)
Dept: NEUROSURGERY | Age: 89
End: 2024-03-11
Payer: MEDICARE

## 2024-03-11 VITALS
HEIGHT: 64 IN | DIASTOLIC BLOOD PRESSURE: 78 MMHG | SYSTOLIC BLOOD PRESSURE: 132 MMHG | TEMPERATURE: 97.8 F | HEART RATE: 79 BPM | WEIGHT: 133.2 LBS | OXYGEN SATURATION: 97 % | BODY MASS INDEX: 22.74 KG/M2

## 2024-03-11 DIAGNOSIS — M54.42 CHRONIC LEFT-SIDED LOW BACK PAIN WITH LEFT-SIDED SCIATICA: ICD-10-CM

## 2024-03-11 DIAGNOSIS — D49.2 NERVE SHEATH TUMOR: ICD-10-CM

## 2024-03-11 DIAGNOSIS — G89.29 CHRONIC LEFT-SIDED LOW BACK PAIN WITH LEFT-SIDED SCIATICA: ICD-10-CM

## 2024-03-11 DIAGNOSIS — M51.36 DEGENERATIVE DISC DISEASE, LUMBAR: ICD-10-CM

## 2024-03-11 DIAGNOSIS — M48.062 LUMBAR STENOSIS WITH NEUROGENIC CLAUDICATION: ICD-10-CM

## 2024-03-11 DIAGNOSIS — M41.50 DEGENERATIVE SCOLIOSIS IN ADULT PATIENT: Primary | ICD-10-CM

## 2024-03-11 LAB
KAPPA LC FREE SER-MCNC: 919.7 MG/L (ref 3.3–19.4)
KAPPA LC FREE/LAMBDA FREE SER: 91.97 (ref 0.26–1.65)
LAMBDA LC FREE SERPL-MCNC: 10 MG/L (ref 5.7–26.3)

## 2024-03-11 PROCEDURE — G8420 CALC BMI NORM PARAMETERS: HCPCS | Performed by: NEUROLOGICAL SURGERY

## 2024-03-11 PROCEDURE — G8427 DOCREV CUR MEDS BY ELIG CLIN: HCPCS | Performed by: NEUROLOGICAL SURGERY

## 2024-03-11 PROCEDURE — 1090F PRES/ABSN URINE INCON ASSESS: CPT | Performed by: NEUROLOGICAL SURGERY

## 2024-03-11 PROCEDURE — 1036F TOBACCO NON-USER: CPT | Performed by: NEUROLOGICAL SURGERY

## 2024-03-11 PROCEDURE — 99213 OFFICE O/P EST LOW 20 MIN: CPT | Performed by: NEUROLOGICAL SURGERY

## 2024-03-11 PROCEDURE — G8484 FLU IMMUNIZE NO ADMIN: HCPCS | Performed by: NEUROLOGICAL SURGERY

## 2024-03-11 PROCEDURE — 1123F ACP DISCUSS/DSCN MKR DOCD: CPT | Performed by: NEUROLOGICAL SURGERY

## 2024-03-11 NOTE — PROGRESS NOTES
left-sided low back pain with left-sided sciatica  M54.42     G89.29       3. Degenerative disc disease, lumbar  M51.36       4. 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

## 2024-03-12 LAB
ALBUMIN MFR UR ELPH: 16.5 %
ALPHA1 GLOB MFR UR ELPH: 2.2 %
ALPHA2 GLOB 24H MFR UR ELPH: 8.3 %
B-GLOBULIN 24H MFR UR ELPH: 20.1 %
GAMMA GLOB 24H MFR UR ELPH: 53 %
LABORATORY COMMENT REPORT: ABNORMAL
M PROTEIN MFR UR ELPH: 39.6 %
PROT UR-MCNC: 25.1 MG/DL

## 2024-03-12 NOTE — PROGRESS NOTES
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   Presenting Symptoms: No relevant physical symptoms reported.     History of Present Illness:  Ms. Keen is a 89 y.o. female who presents today for follow up regarding MGUS.  The past medical history is significant for vitamin D deficiency, PAF on AC, osteopenia, osteoarthritis,  occlusion and stenosis of carotid artery, HLD, mitral valve disorder, malignant neoplasm of rectosigmoid junction, IBS, HTN,  ovarian cancer, colon polyps, Graves disease, GERD, diverticulosis, bilateral carotid artery stenosis, aortic regurgitation, anxiety disorder, total colectomy, tonsillectomy, thyroidectomy, ROSALIA/BSO,  small bowel resection, heart catheterization, cyst I&D, cataract removal, vitrectomy, and orthopedic surgery.  She was evaluated in consultation by Rheumatologist, Dr. Aaron Kapadia, on 11/4/21 after being referred by her PCP for evaluation of osteoporosis.   Review of systems and physical exam were unremarkable.  She was treated with Prolia with her first injection on 11/4/21 with plans for 60 mg Prolia injection every 6 months.  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  Ms. Keen was subsequently referred to [facility], per Rheumatology, for hematology evaluation and treatment of  monoclonal gammopathy.   - We had a lengthy discussion regarding the pathophysiology of plasma cell disorders going over the spectrum of plasma cell diseases utilizing visual

## 2024-03-12 NOTE — PROGRESS NOTES
All orders placed during this encounter were verbal orders received from Dr. Mueller, read back and verified.

## 2024-03-14 LAB
ALBUMIN SERPL ELPH-MCNC: 3.2 G/DL (ref 2.9–4.4)
ALBUMIN/GLOB SERPL: 1 (ref 0.7–1.7)
ALPHA1 GLOB SERPL ELPH-MCNC: 0.5 G/DL (ref 0–0.4)
ALPHA2 GLOB SERPL ELPH-MCNC: 0.8 G/DL (ref 0.4–1)
B-GLOBULIN SERPL ELPH-MCNC: 1 G/DL (ref 0.7–1.3)
GAMMA GLOB SERPL ELPH-MCNC: 1.2 G/DL (ref 0.4–1.8)
GLOBULIN SER-MCNC: 3.5 G/DL (ref 2.2–3.9)
IGA SERPL-MCNC: 62 MG/DL (ref 64–422)
IGG SERPL-MCNC: 1485 MG/DL (ref 586–1602)
IGM SERPL-MCNC: 21 MG/DL (ref 26–217)
INTERPRETATION SERPL IEP-IMP: ABNORMAL
M PROTEIN SERPL ELPH-MCNC: 0.9 G/DL
PROT SERPL-MCNC: 6.7 G/DL (ref 6–8.5)

## 2024-03-22 ENCOUNTER — OFFICE VISIT (OUTPATIENT)
Dept: ONCOLOGY | Age: 89
End: 2024-03-22
Payer: MEDICARE

## 2024-03-22 VITALS
DIASTOLIC BLOOD PRESSURE: 69 MMHG | RESPIRATION RATE: 16 BRPM | WEIGHT: 135 LBS | BODY MASS INDEX: 22.49 KG/M2 | HEART RATE: 79 BPM | SYSTOLIC BLOOD PRESSURE: 146 MMHG | OXYGEN SATURATION: 98 % | TEMPERATURE: 97.4 F | HEIGHT: 65 IN

## 2024-03-22 DIAGNOSIS — E55.9 VITAMIN D DEFICIENCY: ICD-10-CM

## 2024-03-22 DIAGNOSIS — Z91.89 AT RISK FOR BONE DENSITY LOSS: ICD-10-CM

## 2024-03-22 DIAGNOSIS — D47.2 MGUS (MONOCLONAL GAMMOPATHY OF UNKNOWN SIGNIFICANCE): Primary | ICD-10-CM

## 2024-03-22 DIAGNOSIS — Z09 ENCOUNTER FOR FOLLOW-UP EXAMINATION AFTER COMPLETED TREATMENT FOR CONDITIONS OTHER THAN MALIGNANT NEOPLASM: ICD-10-CM

## 2024-03-22 PROCEDURE — 1123F ACP DISCUSS/DSCN MKR DOCD: CPT | Performed by: INTERNAL MEDICINE

## 2024-03-22 PROCEDURE — G8420 CALC BMI NORM PARAMETERS: HCPCS | Performed by: INTERNAL MEDICINE

## 2024-03-22 PROCEDURE — 1090F PRES/ABSN URINE INCON ASSESS: CPT | Performed by: INTERNAL MEDICINE

## 2024-03-22 PROCEDURE — G8427 DOCREV CUR MEDS BY ELIG CLIN: HCPCS | Performed by: INTERNAL MEDICINE

## 2024-03-22 PROCEDURE — 1036F TOBACCO NON-USER: CPT | Performed by: INTERNAL MEDICINE

## 2024-03-22 PROCEDURE — G8484 FLU IMMUNIZE NO ADMIN: HCPCS | Performed by: INTERNAL MEDICINE

## 2024-03-22 PROCEDURE — 99215 OFFICE O/P EST HI 40 MIN: CPT | Performed by: INTERNAL MEDICINE

## 2024-03-22 RX ORDER — DICLOFENAC EPOLAMINE 0.01 G/1
SYSTEM TOPICAL
COMMUNITY
Start: 2024-03-12

## 2024-03-22 ASSESSMENT — PATIENT HEALTH QUESTIONNAIRE - PHQ9
SUM OF ALL RESPONSES TO PHQ QUESTIONS 1-9: 0
1. LITTLE INTEREST OR PLEASURE IN DOING THINGS: NOT AT ALL
SUM OF ALL RESPONSES TO PHQ QUESTIONS 1-9: 0
SUM OF ALL RESPONSES TO PHQ9 QUESTIONS 1 & 2: 0
2. FEELING DOWN, DEPRESSED OR HOPELESS: NOT AT ALL
SUM OF ALL RESPONSES TO PHQ QUESTIONS 1-9: 0
SUM OF ALL RESPONSES TO PHQ QUESTIONS 1-9: 0

## 2024-03-22 NOTE — PATIENT INSTRUCTIONS
Patient Information from Today's Visit    Labs reviewed.  Symptoms reviewed.  Recommend DEXA bone density scan.  You can call to schedule this at [phone].  [facility] - in [place] - also have Oncology Massage \"[facility]\".    Treatment Summary has been discussed and given to patient:N/A    Follow Up: In about a month for labs only.    Please refer to After Visit Summary or [employer] for upcoming appointment information. If you have any questions regarding your upcoming schedule please reach out to your care team through [employer] or call [phone].      -------------------------------------------------------------------------------------------------------------------  Please call our office at [phone] if you have any  of the following symptoms:   Fever of 100.5 or greater  Chills  Shortness of breath  Swelling or pain in one leg    After office hours an answering service is available and will contact a provider for emergencies or if you are experiencing any of the above symptoms.    Patient did express an interest in My Chart.  My Chart log in information explained on the after visit summary printout at the check-out desk.    SARITA TRUJILLO RN    Your Oncology Care Team:  Krista Mueller MD - Hematologist/Oncologist  Italia Irving APRN-CNP - Nurse Practitioner  SANA Michelle RN - Registered Nurse  Ofelia Rivas - Medical Assistant  Abril Coe -

## 2024-04-04 ENCOUNTER — TELEPHONE (OUTPATIENT)
Age: 89
End: 2024-04-04

## 2024-04-04 NOTE — TELEPHONE ENCOUNTER
Andre Mix MD   to Me       4/4/24  4:44 PM  I would not take this supplement if it increases her risk of bleeding.  She is on Eliquis which she needs to continue.  Would discuss with her PCP if other medications would be helpful to control her pain    LVM for pt with Dr. Mix's response.  Informed pt's son, Vipul and dtr-in-law, Sully of Dr. Mix's response. They voiced understanding and will review with pt.  cgh

## 2024-04-04 NOTE — TELEPHONE ENCOUNTER
Pt asking if safe to take [place] for sciatica pain?  Caution on label for pt's on OAC.   [place] Bark contains salicin, which is similar to ASA.   Pt takes Eliquis 2.5mg BID.  Pt c/o increasing sciatica pain from hip to ankles, worse when it rains. Desperate for relief.   She's taken Advil 1x/week x 2 wks without relief. She asks that I include her dtr-in-law, Sully in conversations. Called  Sully. cgh

## 2024-04-04 NOTE — TELEPHONE ENCOUNTER
Pt calling as her sciatica is not getting better with the suggested (2) advil a day. Wants to know if it is ok to take (2) capsules twice a day of [place], 680 mg as she is in terrible pain.

## 2024-04-18 ENCOUNTER — HOSPITAL ENCOUNTER (OUTPATIENT)
Dept: LAB | Age: 89
Discharge: HOME | End: 2024-04-18
Payer: MEDICARE

## 2024-04-18 ENCOUNTER — HOSPITAL ENCOUNTER (OUTPATIENT)
Dept: MAMMOGRAPHY | Age: 89
Discharge: HOME | End: 2024-04-18
Attending: INTERNAL MEDICINE
Payer: MEDICARE

## 2024-04-18 DIAGNOSIS — D47.2 MGUS (MONOCLONAL GAMMOPATHY OF UNKNOWN SIGNIFICANCE): ICD-10-CM

## 2024-04-18 DIAGNOSIS — Z09 ENCOUNTER FOR FOLLOW-UP EXAMINATION AFTER COMPLETED TREATMENT FOR CONDITIONS OTHER THAN MALIGNANT NEOPLASM: ICD-10-CM

## 2024-04-18 DIAGNOSIS — Z91.89 AT RISK FOR BONE DENSITY LOSS: ICD-10-CM

## 2024-04-18 LAB
ALBUMIN SERPL-MCNC: 3.4 G/DL (ref 3.2–4.6)
ALBUMIN/GLOB SERPL: 0.8 (ref 0.4–1.6)
ALP SERPL-CCNC: 95 U/L (ref 50–136)
ALT SERPL-CCNC: 18 U/L (ref 12–65)
ANION GAP SERPL CALC-SCNC: 5 MMOL/L (ref 2–11)
AST SERPL-CCNC: 18 U/L (ref 15–37)
BASOPHILS # BLD: 0 K/UL (ref 0–0.2)
BASOPHILS NFR BLD: 1 % (ref 0–2)
BILIRUB SERPL-MCNC: 0.7 MG/DL (ref 0.2–1.1)
BUN SERPL-MCNC: 18 MG/DL (ref 8–23)
CALCIUM SERPL-MCNC: 10 MG/DL (ref 8.3–10.4)
CHLORIDE SERPL-SCNC: 102 MMOL/L (ref 103–113)
CO2 SERPL-SCNC: 28 MMOL/L (ref 21–32)
CREAT SERPL-MCNC: 0.8 MG/DL (ref 0.6–1)
DIFFERENTIAL METHOD BLD: ABNORMAL
EOSINOPHIL # BLD: 0 K/UL (ref 0–0.8)
EOSINOPHIL NFR BLD: 1 % (ref 0.5–7.8)
ERYTHROCYTE [DISTWIDTH] IN BLOOD BY AUTOMATED COUNT: 14.6 % (ref 11.9–14.6)
GLOBULIN SER CALC-MCNC: 4.1 G/DL (ref 2.8–4.5)
GLUCOSE SERPL-MCNC: 112 MG/DL (ref 65–100)
HCT VFR BLD AUTO: 41.6 % (ref 35.8–46.3)
HGB BLD-MCNC: 13.3 G/DL (ref 11.7–15.4)
IMM GRANULOCYTES # BLD AUTO: 0 K/UL (ref 0–0.5)
IMM GRANULOCYTES NFR BLD AUTO: 1 % (ref 0–5)
LYMPHOCYTES # BLD: 1.2 K/UL (ref 0.5–4.6)
LYMPHOCYTES NFR BLD: 21 % (ref 13–44)
MCH RBC QN AUTO: 29.4 PG (ref 26.1–32.9)
MCHC RBC AUTO-ENTMCNC: 32 G/DL (ref 31.4–35)
MCV RBC AUTO: 92 FL (ref 82–102)
MONOCYTES # BLD: 0.6 K/UL (ref 0.1–1.3)
MONOCYTES NFR BLD: 10 % (ref 4–12)
NEUTS SEG # BLD: 3.9 K/UL (ref 1.7–8.2)
NEUTS SEG NFR BLD: 66 % (ref 43–78)
NRBC # BLD: 0 K/UL (ref 0–0.2)
PLATELET # BLD AUTO: 301 K/UL (ref 150–450)
PMV BLD AUTO: 8.8 FL (ref 9.4–12.3)
POTASSIUM SERPL-SCNC: 4 MMOL/L (ref 3.5–5.1)
PROT SERPL-MCNC: 7.5 G/DL (ref 6.3–8.2)
RBC # BLD AUTO: 4.52 M/UL (ref 4.05–5.2)
SODIUM SERPL-SCNC: 135 MMOL/L (ref 136–146)
WBC # BLD AUTO: 5.7 K/UL (ref 4.3–11.1)

## 2024-04-18 PROCEDURE — 77080 DXA BONE DENSITY AXIAL: CPT

## 2024-04-18 PROCEDURE — 84165 PROTEIN E-PHORESIS SERUM: CPT

## 2024-04-18 PROCEDURE — 36415 COLL VENOUS BLD VENIPUNCTURE: CPT

## 2024-04-18 PROCEDURE — 86334 IMMUNOFIX E-PHORESIS SERUM: CPT

## 2024-04-18 PROCEDURE — 80053 COMPREHEN METABOLIC PANEL: CPT

## 2024-04-18 PROCEDURE — 83521 IG LIGHT CHAINS FREE EACH: CPT

## 2024-04-18 PROCEDURE — 85025 COMPLETE CBC W/AUTO DIFF WBC: CPT

## 2024-04-18 PROCEDURE — 82784 ASSAY IGA/IGD/IGG/IGM EACH: CPT

## 2024-04-19 LAB
KAPPA LC FREE SER-MCNC: 996.2 MG/L (ref 3.3–19.4)
KAPPA LC FREE/LAMBDA FREE SER: 93.1 (ref 0.26–1.65)
LAMBDA LC FREE SERPL-MCNC: 10.7 MG/L (ref 5.7–26.3)

## 2024-04-24 LAB
ALBUMIN SERPL ELPH-MCNC: 3.5 G/DL (ref 2.9–4.4)
ALBUMIN/GLOB SERPL: 1.1 (ref 0.7–1.7)
ALPHA1 GLOB SERPL ELPH-MCNC: 0.3 G/DL (ref 0–0.4)
ALPHA2 GLOB SERPL ELPH-MCNC: 0.8 G/DL (ref 0.4–1)
B-GLOBULIN SERPL ELPH-MCNC: 1 G/DL (ref 0.7–1.3)
GAMMA GLOB SERPL ELPH-MCNC: 1.3 G/DL (ref 0.4–1.8)
GLOBULIN SER-MCNC: 3.4 G/DL (ref 2.2–3.9)
IGA SERPL-MCNC: 73 MG/DL (ref 64–422)
IGG SERPL-MCNC: 1717 MG/DL (ref 586–1602)
IGM SERPL-MCNC: 25 MG/DL (ref 26–217)
INTERPRETATION SERPL IEP-IMP: ABNORMAL
M PROTEIN SERPL ELPH-MCNC: 1 G/DL
PROT SERPL-MCNC: 6.9 G/DL (ref 6–8.5)

## 2024-05-13 DIAGNOSIS — M81.0 AGE-RELATED OSTEOPOROSIS WITHOUT CURRENT PATHOLOGICAL FRACTURE: Primary | ICD-10-CM

## 2024-05-16 ENCOUNTER — NURSE ONLY (OUTPATIENT)
Dept: RHEUMATOLOGY | Age: 89
End: 2024-05-16
Payer: MEDICARE

## 2024-05-16 ENCOUNTER — OFFICE VISIT (OUTPATIENT)
Dept: RHEUMATOLOGY | Age: 89
End: 2024-05-16
Payer: MEDICARE

## 2024-05-16 VITALS
DIASTOLIC BLOOD PRESSURE: 68 MMHG | HEART RATE: 84 BPM | BODY MASS INDEX: 22.82 KG/M2 | HEIGHT: 65 IN | WEIGHT: 137 LBS | RESPIRATION RATE: 14 BRPM | SYSTOLIC BLOOD PRESSURE: 112 MMHG

## 2024-05-16 VITALS — DIASTOLIC BLOOD PRESSURE: 68 MMHG | SYSTOLIC BLOOD PRESSURE: 112 MMHG | TEMPERATURE: 97.5 F | HEART RATE: 84 BPM

## 2024-05-16 DIAGNOSIS — M81.0 AGE-RELATED OSTEOPOROSIS WITHOUT CURRENT PATHOLOGICAL FRACTURE: Primary | ICD-10-CM

## 2024-05-16 PROCEDURE — 1036F TOBACCO NON-USER: CPT | Performed by: INTERNAL MEDICINE

## 2024-05-16 PROCEDURE — G8427 DOCREV CUR MEDS BY ELIG CLIN: HCPCS | Performed by: INTERNAL MEDICINE

## 2024-05-16 PROCEDURE — 1123F ACP DISCUSS/DSCN MKR DOCD: CPT | Performed by: INTERNAL MEDICINE

## 2024-05-16 PROCEDURE — G8420 CALC BMI NORM PARAMETERS: HCPCS | Performed by: INTERNAL MEDICINE

## 2024-05-16 PROCEDURE — 99214 OFFICE O/P EST MOD 30 MIN: CPT | Performed by: INTERNAL MEDICINE

## 2024-05-16 PROCEDURE — 1090F PRES/ABSN URINE INCON ASSESS: CPT | Performed by: INTERNAL MEDICINE

## 2024-05-16 PROCEDURE — 96365 THER/PROPH/DIAG IV INF INIT: CPT | Performed by: INTERNAL MEDICINE

## 2024-05-16 RX ORDER — ALBUTEROL SULFATE 90 UG/1
4 AEROSOL, METERED RESPIRATORY (INHALATION) PRN
OUTPATIENT
Start: 2025-05-15

## 2024-05-16 RX ORDER — SODIUM CHLORIDE 9 MG/ML
INJECTION, SOLUTION INTRAVENOUS CONTINUOUS
OUTPATIENT
Start: 2025-05-15

## 2024-05-16 RX ORDER — ONDANSETRON 2 MG/ML
8 INJECTION INTRAMUSCULAR; INTRAVENOUS
OUTPATIENT
Start: 2025-05-15

## 2024-05-16 RX ORDER — EPINEPHRINE 1 MG/ML
0.3 INJECTION, SOLUTION, CONCENTRATE INTRAVENOUS PRN
OUTPATIENT
Start: 2025-05-15

## 2024-05-16 RX ORDER — ZOLEDRONIC ACID 5 MG/100ML
5 INJECTION, SOLUTION INTRAVENOUS ONCE
Status: COMPLETED | OUTPATIENT
Start: 2024-05-16 | End: 2024-05-16

## 2024-05-16 RX ORDER — ACETAMINOPHEN 325 MG/1
650 TABLET ORAL
OUTPATIENT
Start: 2025-05-15

## 2024-05-16 RX ORDER — ZOLEDRONIC ACID 5 MG/100ML
5 INJECTION, SOLUTION INTRAVENOUS ONCE
OUTPATIENT
Start: 2025-05-15 | End: 2025-05-15

## 2024-05-16 RX ORDER — ALBUTEROL SULFATE 90 UG/1
4 AEROSOL, METERED RESPIRATORY (INHALATION) PRN
OUTPATIENT
Start: 2024-05-16

## 2024-05-16 RX ORDER — SODIUM CHLORIDE 9 MG/ML
INJECTION, SOLUTION INTRAVENOUS CONTINUOUS
OUTPATIENT
Start: 2024-05-16

## 2024-05-16 RX ORDER — ACETAMINOPHEN 325 MG/1
650 TABLET ORAL
OUTPATIENT
Start: 2024-05-16

## 2024-05-16 RX ORDER — DIPHENHYDRAMINE HYDROCHLORIDE 50 MG/ML
50 INJECTION INTRAMUSCULAR; INTRAVENOUS
OUTPATIENT
Start: 2024-05-16

## 2024-05-16 RX ORDER — ZOLEDRONIC ACID 5 MG/100ML
5 INJECTION, SOLUTION INTRAVENOUS ONCE
COMMUNITY

## 2024-05-16 RX ORDER — ONDANSETRON 2 MG/ML
8 INJECTION INTRAMUSCULAR; INTRAVENOUS
OUTPATIENT
Start: 2024-05-16

## 2024-05-16 RX ORDER — DIPHENHYDRAMINE HYDROCHLORIDE 50 MG/ML
50 INJECTION INTRAMUSCULAR; INTRAVENOUS
OUTPATIENT
Start: 2025-05-15

## 2024-05-16 RX ORDER — EPINEPHRINE 1 MG/ML
0.3 INJECTION, SOLUTION, CONCENTRATE INTRAVENOUS PRN
OUTPATIENT
Start: 2024-05-16

## 2024-05-16 RX ADMIN — ZOLEDRONIC ACID 5 MG: 5 INJECTION, SOLUTION INTRAVENOUS at 14:10

## 2024-05-16 NOTE — PROGRESS NOTES
[facility] RHEUMATOLOGY  PRASHANTH Kapadia M.D.     Phone: [phone]   Fax: [phone]    5/5/2022 12:48 PM      SUBJECTIVE:Per previous not from Dr. Kapadia on 11/4/21:     Madalyn Keen is a 88 y.o. female referred for osteoporosis.  She had a DEXA scan done in 2018 that showed osteoporosis but apparently was not contacted and no therapy was initiated.  She had a follow-up DEXA scan done in September 2021 showed a total mean hip T score of -3.5 and a femoral neck T score (do not know which one) of -3.8 and a fracture risk in the hip of 13% and other sites was 26%.  She does take vitamin D 2000 IUs a day but no calcium does take a multivitamin sporadically.  She did have ovarian cancer and had a total hysterectomy about age 43 did take some hormone for couple years but nothing since about age 50 maybe 52.  No family history of osteoporosis or hip fractures.  She did drink calcium and exercise she was growing up.  Fairly normal menstrual cycles up until she had the surgery for ovarian cancer.  Weight and appetite stable and she has had some rectosigmoid cancer and had surgery stools are frequent but formed and no weight change.    Since last visit of 5/11/2023:    zoledronic acid: First infusion 5/11/2022      OP: She has been on at some point but was started on zoledronic acid receiving her first treatment 5/11/2022 and a second treatment in 2023 and is due for her third infusion today.  She has not on the calcium having stopped it but apparently is on the vitamin D at 2000 IUs a day and perhaps a multivitamin and again have asked to get back on the calcium in the form of Caltrate 6 and milligrams 1 twice a day    She had an updated DEXA scan since she was here and it was roughly 2 years after initiating the Reclast.  As noted below she still is at very high risk with T-score less than -3.0.  Is difficult to tell whether there is been any real improvement due to the poor report but it

## 2024-05-16 NOTE — PROGRESS NOTES
[facility] RHEUMATOLOGY  [address] [place] [address], [address]  [place] [address]  Office : [phone], Fax: [phone]       Reclast 5mg/100ml infused today over 30  minutes for safety. Infusion tolerated well without complications. Advised patient to continue taking vit D, calcium, hydrate, tylenol today and tomorrow if aches occur  post infusion. Call with any problems or side effects. Infusion placed in right AC vein by Vanessa Reaves RN .    IV insertion time: 1408  Medication start time: 1410   Medication completion time: 1440     Patient discharged feeling good  and instructed to call the office with any post-infusion issues.    Pre-infusion/injection questionairre for osteoporosis    1. Have you had or are you planning any dental work?  no    2. Are you taking your calcium and vitamin D? yes    3. When was your last osteoporosis treatment?   5/11/2023    4. Have you had any recent fractures? no

## 2024-06-14 ENCOUNTER — HOSPITAL ENCOUNTER (OUTPATIENT)
Dept: LAB | Age: 89
End: 2024-06-14
Payer: MEDICARE

## 2024-06-14 DIAGNOSIS — E55.9 VITAMIN D DEFICIENCY: ICD-10-CM

## 2024-06-14 DIAGNOSIS — D47.2 MGUS (MONOCLONAL GAMMOPATHY OF UNKNOWN SIGNIFICANCE): ICD-10-CM

## 2024-06-14 LAB
25(OH)D3 SERPL-MCNC: 74.1 NG/ML (ref 30–100)
ALBUMIN SERPL-MCNC: 3.5 G/DL (ref 3.2–4.6)
ALBUMIN/GLOB SERPL: 1.1 (ref 1–1.9)
ALP SERPL-CCNC: 91 U/L (ref 35–104)
ALT SERPL-CCNC: 14 U/L (ref 12–65)
ANION GAP SERPL CALC-SCNC: 9 MMOL/L (ref 9–18)
AST SERPL-CCNC: 23 U/L (ref 15–37)
BASOPHILS # BLD: 0 K/UL (ref 0–0.2)
BASOPHILS NFR BLD: 1 % (ref 0–2)
BILIRUB SERPL-MCNC: 0.6 MG/DL (ref 0–1.2)
BUN SERPL-MCNC: 15 MG/DL (ref 8–23)
CALCIUM SERPL-MCNC: 10.8 MG/DL (ref 8.8–10.2)
CHLORIDE SERPL-SCNC: 98 MMOL/L (ref 98–107)
CO2 SERPL-SCNC: 28 MMOL/L (ref 20–28)
CREAT SERPL-MCNC: 0.78 MG/DL (ref 0.6–1.1)
DIFFERENTIAL METHOD BLD: ABNORMAL
EOSINOPHIL # BLD: 0.1 K/UL (ref 0–0.8)
EOSINOPHIL NFR BLD: 1 % (ref 0.5–7.8)
ERYTHROCYTE [DISTWIDTH] IN BLOOD BY AUTOMATED COUNT: 14.7 % (ref 11.9–14.6)
GLOBULIN SER CALC-MCNC: 3.3 G/DL (ref 2.3–3.5)
GLUCOSE SERPL-MCNC: 93 MG/DL (ref 70–99)
HCT VFR BLD AUTO: 39.1 % (ref 35.8–46.3)
HGB BLD-MCNC: 12.9 G/DL (ref 11.7–15.4)
IMM GRANULOCYTES # BLD AUTO: 0.1 K/UL (ref 0–0.5)
IMM GRANULOCYTES NFR BLD AUTO: 1 % (ref 0–5)
KAPPA LC FREE SER-MCNC: 1121 MG/L (ref 2.4–20.7)
KAPPA LC FREE/LAMBDA FREE SER: 50 (ref 0.2–0.8)
LAMBDA LC FREE SERPL-MCNC: 22.4 MG/L (ref 4.2–27.7)
LYMPHOCYTES # BLD: 1.3 K/UL (ref 0.5–4.6)
LYMPHOCYTES NFR BLD: 23 % (ref 13–44)
MCH RBC QN AUTO: 29.9 PG (ref 26.1–32.9)
MCHC RBC AUTO-ENTMCNC: 33 G/DL (ref 31.4–35)
MCV RBC AUTO: 90.7 FL (ref 82–102)
MONOCYTES # BLD: 0.5 K/UL (ref 0.1–1.3)
MONOCYTES NFR BLD: 9 % (ref 4–12)
NEUTS SEG # BLD: 3.5 K/UL (ref 1.7–8.2)
NEUTS SEG NFR BLD: 65 % (ref 43–78)
NRBC # BLD: 0 K/UL (ref 0–0.2)
PLATELET # BLD AUTO: 284 K/UL (ref 150–450)
PMV BLD AUTO: 8.7 FL (ref 9.4–12.3)
POTASSIUM SERPL-SCNC: 4.2 MMOL/L (ref 3.5–5.1)
PROT SERPL-MCNC: 6.8 G/DL (ref 6.3–8.2)
RBC # BLD AUTO: 4.31 M/UL (ref 4.05–5.2)
SODIUM SERPL-SCNC: 135 MMOL/L (ref 136–145)
WBC # BLD AUTO: 5.5 K/UL (ref 4.3–11.1)

## 2024-06-14 PROCEDURE — 82306 VITAMIN D 25 HYDROXY: CPT

## 2024-06-14 PROCEDURE — 82784 ASSAY IGA/IGD/IGG/IGM EACH: CPT

## 2024-06-14 PROCEDURE — 84165 PROTEIN E-PHORESIS SERUM: CPT

## 2024-06-14 PROCEDURE — 80053 COMPREHEN METABOLIC PANEL: CPT

## 2024-06-14 PROCEDURE — 86334 IMMUNOFIX E-PHORESIS SERUM: CPT

## 2024-06-14 PROCEDURE — 36415 COLL VENOUS BLD VENIPUNCTURE: CPT

## 2024-06-14 PROCEDURE — 84166 PROTEIN E-PHORESIS/URINE/CSF: CPT

## 2024-06-14 PROCEDURE — 85025 COMPLETE CBC W/AUTO DIFF WBC: CPT

## 2024-06-14 PROCEDURE — 84156 ASSAY OF PROTEIN URINE: CPT

## 2024-06-14 PROCEDURE — 83521 IG LIGHT CHAINS FREE EACH: CPT

## 2024-06-17 ENCOUNTER — OFFICE VISIT (OUTPATIENT)
Age: 89
End: 2024-06-17
Payer: MEDICARE

## 2024-06-17 VITALS
SYSTOLIC BLOOD PRESSURE: 136 MMHG | DIASTOLIC BLOOD PRESSURE: 70 MMHG | HEART RATE: 82 BPM | HEIGHT: 65 IN | BODY MASS INDEX: 22.76 KG/M2 | WEIGHT: 136.6 LBS

## 2024-06-17 DIAGNOSIS — I10 PRIMARY HYPERTENSION: ICD-10-CM

## 2024-06-17 DIAGNOSIS — I48.11 LONGSTANDING PERSISTENT ATRIAL FIBRILLATION (HCC): Primary | ICD-10-CM

## 2024-06-17 DIAGNOSIS — Z95.0 PRESENCE OF BIVENTRICULAR CARDIAC PACEMAKER: ICD-10-CM

## 2024-06-17 DIAGNOSIS — I35.1 NONRHEUMATIC AORTIC VALVE INSUFFICIENCY: ICD-10-CM

## 2024-06-17 PROCEDURE — 1036F TOBACCO NON-USER: CPT | Performed by: INTERNAL MEDICINE

## 2024-06-17 PROCEDURE — 1123F ACP DISCUSS/DSCN MKR DOCD: CPT | Performed by: INTERNAL MEDICINE

## 2024-06-17 PROCEDURE — 1090F PRES/ABSN URINE INCON ASSESS: CPT | Performed by: INTERNAL MEDICINE

## 2024-06-17 PROCEDURE — 99214 OFFICE O/P EST MOD 30 MIN: CPT | Performed by: INTERNAL MEDICINE

## 2024-06-17 PROCEDURE — G8420 CALC BMI NORM PARAMETERS: HCPCS | Performed by: INTERNAL MEDICINE

## 2024-06-17 PROCEDURE — G8427 DOCREV CUR MEDS BY ELIG CLIN: HCPCS | Performed by: INTERNAL MEDICINE

## 2024-06-17 RX ORDER — INDAPAMIDE 1.25 MG/1
1.25 TABLET ORAL DAILY
Qty: 90 TABLET | Refills: 3 | Status: SHIPPED | OUTPATIENT
Start: 2024-06-17

## 2024-06-17 RX ORDER — METOPROLOL SUCCINATE 50 MG/1
50 TABLET, EXTENDED RELEASE ORAL
Qty: 90 TABLET | Refills: 3 | Status: SHIPPED | OUTPATIENT
Start: 2024-06-17

## 2024-06-17 ASSESSMENT — ENCOUNTER SYMPTOMS: SHORTNESS OF BREATH: 0

## 2024-06-17 NOTE — PROGRESS NOTES
results were reviewed with the patient today.       Lab Results   Component Value Date    WBC 5.5 06/14/2024    HGB 12.9 06/14/2024    HCT 39.1 06/14/2024    MCV 90.7 06/14/2024     06/14/2024       Lab Results   Component Value Date/Time     06/14/2024 11:15 AM    K 4.2 06/14/2024 11:15 AM    CL 98 06/14/2024 11:15 AM    CO2 28 06/14/2024 11:15 AM    BUN 15 06/14/2024 11:15 AM    CREATININE 0.78 06/14/2024 11:15 AM    GLUCOSE 93 06/14/2024 11:15 AM    CALCIUM 10.8 06/14/2024 11:15 AM        Lab Results   Component Value Date    CHOL 274 (H) 07/15/2021     Lab Results   Component Value Date    TRIG 140 07/15/2021     Lab Results   Component Value Date    HDL 59 07/15/2021     No components found for: \"LDLCHOLESTEROL\", \"LDLCALC\"  Lab Results   Component Value Date    VLDL 25 07/15/2021     No results found for: \"CHOLHDLRATIO\"     Data from outside records/labs from outside providers have been reviewed and summarized as noted in the HPI, past history and data review sections of this note       ASSESSMENT and PLAN      1. Primary hypertension  Currently BP appears to be under acceptable control on current therapy.  Continue current medications including Toprol and indapamide.   Discussed monitoring ambulatory BP readings to ensure continued optimal management.  If BP becomes elevated, patient is encouraged to contact my office for further titration of therapy.      - metoprolol succinate (TOPROL XL) 50 MG extended release tablet; Take 1 tablet by mouth nightly  Dispense: 90 tablet; Refill: 3  - indapamide (LOZOL) 1.25 MG tablet; Take 1 tablet by mouth daily  Dispense: 90 tablet; Refill: 3    2. Longstanding persistent atrial fibrillation (HCC)  Patient doing well after biventricular pacemaker and AV node ablation.  Continue anticoagulation with Eliquis.    3. Nonrheumatic aortic valve insufficiency  Mild based on most recent assessment with [facility].    4. Presence of biventricular cardiac pacemaker  Normal

## 2024-06-19 ENCOUNTER — TELEPHONE (OUTPATIENT)
Dept: ORTHOPEDIC SURGERY | Age: 89
End: 2024-06-19

## 2024-06-19 LAB
ALBUMIN MFR UR ELPH: 11.4 %
ALBUMIN SERPL ELPH-MCNC: 3.4 G/DL (ref 2.9–4.4)
ALBUMIN/GLOB SERPL: 1.1 (ref 0.7–1.7)
ALPHA1 GLOB MFR UR ELPH: 3 %
ALPHA1 GLOB SERPL ELPH-MCNC: 0.3 G/DL (ref 0–0.4)
ALPHA2 GLOB 24H MFR UR ELPH: 7.9 %
ALPHA2 GLOB SERPL ELPH-MCNC: 0.8 G/DL (ref 0.4–1)
B-GLOBULIN 24H MFR UR ELPH: 18.6 %
B-GLOBULIN SERPL ELPH-MCNC: 1 G/DL (ref 0.7–1.3)
GAMMA GLOB 24H MFR UR ELPH: 59.2 %
GAMMA GLOB SERPL ELPH-MCNC: 1.3 G/DL (ref 0.4–1.8)
GLOBULIN SER-MCNC: 3.4 G/DL (ref 2.2–3.9)
IGA SERPL-MCNC: 86 MG/DL (ref 64–422)
IGG SERPL-MCNC: 1756 MG/DL (ref 586–1602)
IGM SERPL-MCNC: 24 MG/DL (ref 26–217)
INTERPRETATION SERPL IEP-IMP: ABNORMAL
LABORATORY COMMENT REPORT: ABNORMAL
M PROTEIN MFR UR ELPH: 38.9 %
M PROTEIN SERPL ELPH-MCNC: 0.9 G/DL
PROT SERPL-MCNC: 6.8 G/DL (ref 6–8.5)
PROT UR-MCNC: 20.5 MG/DL

## 2024-06-19 NOTE — TELEPHONE ENCOUNTER
Called and spoke to daughter in law to confirm what type of injection pt wanted. Pt wants a gel injection so I asked apts to call and schedule her with the joint team instead of [facility]. Cancelled apt with [facility].

## 2024-06-19 NOTE — TELEPHONE ENCOUNTER
Patient wants to get gel injections in her right knee and has to change providers due to insurance change to [facility] medicare.  Patient has been seen at [facility] for treatment and records are available in care everywhere.  Will Dr. Sahu see this patient?

## 2024-06-28 ENCOUNTER — OFFICE VISIT (OUTPATIENT)
Dept: ONCOLOGY | Age: 89
End: 2024-06-28

## 2024-06-28 VITALS
DIASTOLIC BLOOD PRESSURE: 64 MMHG | BODY MASS INDEX: 22.33 KG/M2 | TEMPERATURE: 97.6 F | OXYGEN SATURATION: 94 % | SYSTOLIC BLOOD PRESSURE: 134 MMHG | RESPIRATION RATE: 16 BRPM | WEIGHT: 134 LBS | HEIGHT: 65 IN | HEART RATE: 77 BPM

## 2024-06-28 DIAGNOSIS — D47.2 MGUS (MONOCLONAL GAMMOPATHY OF UNKNOWN SIGNIFICANCE): Primary | ICD-10-CM

## 2024-06-28 DIAGNOSIS — D47.2 IGG MONOCLONAL PROTEIN DISORDER: ICD-10-CM

## 2024-06-28 RX ORDER — FLUOCINOLONE ACETONIDE 0.11 MG/ML
3 OIL AURICULAR (OTIC) 3 TIMES DAILY PRN
COMMUNITY
Start: 2024-05-30 | End: 2024-06-29

## 2024-06-28 RX ORDER — FLUTICASONE PROPIONATE 50 MCG
2 SPRAY, SUSPENSION (ML) NASAL DAILY
COMMUNITY
Start: 2024-05-30 | End: 2024-06-29

## 2024-06-28 ASSESSMENT — PATIENT HEALTH QUESTIONNAIRE - PHQ9
1. LITTLE INTEREST OR PLEASURE IN DOING THINGS: NOT AT ALL
SUM OF ALL RESPONSES TO PHQ QUESTIONS 1-9: 0
SUM OF ALL RESPONSES TO PHQ9 QUESTIONS 1 & 2: 0
SUM OF ALL RESPONSES TO PHQ QUESTIONS 1-9: 0
2. FEELING DOWN, DEPRESSED OR HOPELESS: NOT AT ALL
SUM OF ALL RESPONSES TO PHQ QUESTIONS 1-9: 0
SUM OF ALL RESPONSES TO PHQ QUESTIONS 1-9: 0

## 2024-07-03 DIAGNOSIS — M25.561 RIGHT KNEE PAIN, UNSPECIFIED CHRONICITY: Primary | ICD-10-CM

## 2024-07-09 ASSESSMENT — ENCOUNTER SYMPTOMS
CONSTIPATION: 0
VOICE CHANGE: 0
NAUSEA: 0
HEMOPTYSIS: 0
BLOOD IN STOOL: 0
ABDOMINAL DISTENTION: 0
SCLERAL ICTERUS: 0
VOMITING: 0
WHEEZING: 0
SHORTNESS OF BREATH: 0
ABDOMINAL PAIN: 0
TROUBLE SWALLOWING: 0
DIARRHEA: 0
CHEST TIGHTNESS: 0
SORE THROAT: 0

## 2024-07-09 NOTE — PROGRESS NOTES
ameliorates the discomfort too.  She is off prednisone.  She did have a right knee injection today and is planning a shoulder aspiration as well as injection in the coming days.  Labs reviewed WBC 5.8, hemoglobin 12.4 and platelets 280.  Her myeloma labs are pending.  We addressed multiple issues including her mobility.  She continues to use a walker.  She wishes she had a bit more energy and we discussed different exercises she can do to strengthen her muscles.  She is eating okay but admits to not drinking enough fluids.  We reviewed how to boost her hydration.  No other issues or concerns at this time.  - Labs reviewed and M spike remains stable at 0.7.  FLC's are significantly down to 234/6 with ratio 37.  Would hold off on BMBx at this time.    - here for follow-up with her children.  She is doing okay in general.  No new aches or pains.  She did have repeat MRIs which showed some progressive inflammatory changes on her scans but according to Dr. Ya not consistent with osteomyelitis.  She also has no signs of infection at this time.  We discussed her labs in detail.  Mild rise in FLC noted.  M spike remains about the same at 0.9.  Her ratio was 91.  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  Will hold off on initiating treatment due to the side effect profile for now and plan to repeat labs in a few weeks.  She will then follow-up with NP in 2 months.  She likes this plan.  If worsening of counts, can p/w repeat BMBx    All questions were asked and answered to the best of my ability.  The patient verbalized understanding and agrees with the plan above.      Italia Irving, JAZMIN - CNP  [facility] Hematology and Oncology  [address] [place] [address]  Office : [phone]  Fax : [phone]

## 2024-07-11 ENCOUNTER — OFFICE VISIT (OUTPATIENT)
Dept: ORTHOPEDIC SURGERY | Age: 89
End: 2024-07-11
Payer: MEDICARE

## 2024-07-11 DIAGNOSIS — M17.11 OSTEOARTHRITIS OF RIGHT KNEE, UNSPECIFIED OSTEOARTHRITIS TYPE: ICD-10-CM

## 2024-07-11 DIAGNOSIS — M25.561 RIGHT KNEE PAIN, UNSPECIFIED CHRONICITY: Primary | ICD-10-CM

## 2024-07-11 PROCEDURE — G8428 CUR MEDS NOT DOCUMENT: HCPCS | Performed by: PHYSICIAN ASSISTANT

## 2024-07-11 PROCEDURE — 99204 OFFICE O/P NEW MOD 45 MIN: CPT | Performed by: PHYSICIAN ASSISTANT

## 2024-07-11 PROCEDURE — G8420 CALC BMI NORM PARAMETERS: HCPCS | Performed by: PHYSICIAN ASSISTANT

## 2024-07-11 PROCEDURE — 20611 DRAIN/INJ JOINT/BURSA W/US: CPT | Performed by: PHYSICIAN ASSISTANT

## 2024-07-11 PROCEDURE — 1123F ACP DISCUSS/DSCN MKR DOCD: CPT | Performed by: PHYSICIAN ASSISTANT

## 2024-07-11 PROCEDURE — 1090F PRES/ABSN URINE INCON ASSESS: CPT | Performed by: PHYSICIAN ASSISTANT

## 2024-07-11 PROCEDURE — 1036F TOBACCO NON-USER: CPT | Performed by: PHYSICIAN ASSISTANT

## 2024-07-11 RX ORDER — TRIAMCINOLONE ACETONIDE 40 MG/ML
40 INJECTION, SUSPENSION INTRA-ARTICULAR; INTRAMUSCULAR ONCE
Status: COMPLETED | OUTPATIENT
Start: 2024-07-11 | End: 2024-07-11

## 2024-07-11 RX ADMIN — TRIAMCINOLONE ACETONIDE 40 MG: 40 INJECTION, SUSPENSION INTRA-ARTICULAR; INTRAMUSCULAR at 11:51

## 2024-07-11 NOTE — PROGRESS NOTES
Name: Madalyn Keen  YOB: 1934  Gender: female  MRN: [id number]    CC:   Chief Complaint   Patient presents with    Knee Pain     Right knee-getting xrays today  Can start Synvisc #1         DIAGNOSIS:   Encounter Diagnoses   Name Primary?    Right knee pain, unspecified chronicity Yes    Osteoarthritis of right knee, unspecified osteoarthritis type         HPI:   The patient is a 89-year-old female who presents for first-time visit complaining of chronic intermittent right knee pain that has been present for several years, is mild to moderate in severity, and  and is becoming worse.  It does not hurt night when sleeping.  The pain is located over the right knee.  It does hurt to walk and gets worse with increased distances.   The pain sometimes radiates down the leg.  Numbness and tingling are not noted.   Treatment so far has been IA cortisone and HP (Durolane) which have been effective in the past with expection to last HP injection in November which did not provide lasting relief.  H/o left TKA in 2004 at [facility] which is doing well.  H/o a-fib- takes Eliquis, s/p pacemaker placement, lumbar spine DJD.       Current Outpatient Medications:     fluticasone (FLONASE) 50 MCG/ACT nasal spray, 2 sprays by Nasal route daily, Disp: , Rfl:     metoprolol succinate (TOPROL XL) 50 MG extended release tablet, Take 1 tablet by mouth nightly, Disp: 90 tablet, Rfl: 3    apixaban (ELIQUIS) 2.5 MG TABS tablet, Take 1 tablet by mouth 2 times daily, Disp: 180 tablet, Rfl: 3    indapamide (LOZOL) 1.25 MG tablet, Take 1 tablet by mouth daily, Disp: 90 tablet, Rfl: 3    zoledronic acid (RECLAST) 5 MG/100ML SOLN, Infuse 100 mLs intravenously once, Disp: , Rfl:     LICART 1.3 % PT24, , Disp: , Rfl:     denosumab (PROLIA) 60 MG/ML SOSY SC injection, Inject 1 mL into the skin once yearly (Patient not taking: Reported on 6/17/2024), Disp: , Rfl:     bimatoprost (LUMIGAN) 0.01 % SOLN ophthalmic drops, Place 1 drop

## 2024-08-02 ENCOUNTER — NURSE ONLY (OUTPATIENT)
Age: 89
End: 2024-08-02

## 2024-08-02 DIAGNOSIS — I48.19 PERSISTENT ATRIAL FIBRILLATION (HCC): Primary | ICD-10-CM

## 2024-08-14 ENCOUNTER — OFFICE VISIT (OUTPATIENT)
Dept: ORTHOPEDIC SURGERY | Age: 89
End: 2024-08-14
Payer: MEDICARE

## 2024-08-14 DIAGNOSIS — M17.11 OSTEOARTHRITIS OF RIGHT KNEE, UNSPECIFIED OSTEOARTHRITIS TYPE: Primary | ICD-10-CM

## 2024-08-14 PROCEDURE — 1090F PRES/ABSN URINE INCON ASSESS: CPT | Performed by: ORTHOPAEDIC SURGERY

## 2024-08-14 PROCEDURE — 99213 OFFICE O/P EST LOW 20 MIN: CPT | Performed by: ORTHOPAEDIC SURGERY

## 2024-08-14 PROCEDURE — G8428 CUR MEDS NOT DOCUMENT: HCPCS | Performed by: ORTHOPAEDIC SURGERY

## 2024-08-14 PROCEDURE — 1123F ACP DISCUSS/DSCN MKR DOCD: CPT | Performed by: ORTHOPAEDIC SURGERY

## 2024-08-14 PROCEDURE — 20611 DRAIN/INJ JOINT/BURSA W/US: CPT | Performed by: ORTHOPAEDIC SURGERY

## 2024-08-14 PROCEDURE — G8420 CALC BMI NORM PARAMETERS: HCPCS | Performed by: ORTHOPAEDIC SURGERY

## 2024-08-14 PROCEDURE — 1036F TOBACCO NON-USER: CPT | Performed by: ORTHOPAEDIC SURGERY

## 2024-08-14 NOTE — PROGRESS NOTES
Name: Madalyn Keen  YOB: 1934  Gender: female  MRN: [id number]      CC: Right Knee Pain     PROCEDURE: 1 Durolane injection    DIAGNOSIS:    Encounter Diagnosis   Name Primary?    Osteoarthritis of right knee, unspecified osteoarthritis type Yes       Funky Android US unit with variable frequency (6.0-15.0 MHz) linear transducer was used visualize the intracondylar notch, retropatellar fat pad, patella tendon, patella, tibia, and to ensure proper intra-articular needle placement.  Injection image was saved to patient's permanent chart.    Procedure Note: Time out was performed which included identifying the patient by name and date of birth.  The procedure site was identified with all present in agreement.   The patient was placed in upright position with knee hanging freely from exam table.  The right  knee was prepped in sterile fashion using alcohol wipe.  Using Mindray ultrasound guidance, a 22 gauge needle was then introduced into the knee joint from an infrapatellar approach and  3 mL of single injection Durolane was injected freely.  The needle was then removed, pressure hemostasis achieved, injection site was cleansed with alcohol wipe and dressed with band aid.    The patient tolerated the procedure without complication.  The patient  will follow up as scheduled.    Her leg has about 40 degrees valgus deformity.  It does not correct but about 25 degrees of valgus.  I told her that the solution for her would require knee replacement surgery.  She is hoping to avoid this at her age.  She has done well with her left knee arthroplasty.  Will see her back in 6 months for another Durolane injection.  She will maintain on the walker for support over the interim    20 minutes was spent today in review the radiographs review the chart discussion of treatment options including the need and benefit for replacement surgery.  Also discussed with the patient the concerns for her age and desire to avoid

## 2024-10-03 ENCOUNTER — HOSPITAL ENCOUNTER (OUTPATIENT)
Dept: LAB | Age: 89
Discharge: HOME | End: 2024-10-03
Payer: MEDICARE

## 2024-10-03 DIAGNOSIS — E55.9 VITAMIN D DEFICIENCY: ICD-10-CM

## 2024-10-03 DIAGNOSIS — D47.2 MGUS (MONOCLONAL GAMMOPATHY OF UNKNOWN SIGNIFICANCE): ICD-10-CM

## 2024-10-03 LAB
25(OH)D3 SERPL-MCNC: 87.6 NG/ML (ref 30–100)
ALBUMIN SERPL-MCNC: 3.6 G/DL (ref 3.2–4.6)
ALBUMIN/GLOB SERPL: 1 (ref 1–1.9)
ALP SERPL-CCNC: 81 U/L (ref 35–104)
ALT SERPL-CCNC: 12 U/L (ref 8–45)
ANION GAP SERPL CALC-SCNC: 10 MMOL/L (ref 9–18)
AST SERPL-CCNC: 20 U/L (ref 15–37)
BASOPHILS # BLD: 0.1 K/UL (ref 0–0.2)
BASOPHILS NFR BLD: 1 % (ref 0–2)
BILIRUB SERPL-MCNC: 0.6 MG/DL (ref 0–1.2)
BUN SERPL-MCNC: 15 MG/DL (ref 8–23)
CALCIUM SERPL-MCNC: 10.1 MG/DL (ref 8.8–10.2)
CHLORIDE SERPL-SCNC: 97 MMOL/L (ref 98–107)
CO2 SERPL-SCNC: 27 MMOL/L (ref 20–28)
CREAT SERPL-MCNC: 0.81 MG/DL (ref 0.6–1.1)
DIFFERENTIAL METHOD BLD: ABNORMAL
EOSINOPHIL # BLD: 0 K/UL (ref 0–0.8)
EOSINOPHIL NFR BLD: 1 % (ref 0.5–7.8)
ERYTHROCYTE [DISTWIDTH] IN BLOOD BY AUTOMATED COUNT: 14.3 % (ref 11.9–14.6)
GLOBULIN SER CALC-MCNC: 3.7 G/DL (ref 2.3–3.5)
GLUCOSE SERPL-MCNC: 95 MG/DL (ref 70–99)
HCT VFR BLD AUTO: 40.3 % (ref 35.8–46.3)
HGB BLD-MCNC: 13.2 G/DL (ref 11.7–15.4)
IMM GRANULOCYTES # BLD AUTO: 0 K/UL (ref 0–0.5)
IMM GRANULOCYTES NFR BLD AUTO: 1 % (ref 0–5)
KAPPA LC FREE SER-MCNC: 1084 MG/L (ref 2.4–20.7)
KAPPA LC FREE/LAMBDA FREE SER: 59.9 (ref 0.2–0.8)
LAMBDA LC FREE SERPL-MCNC: 18.1 MG/L (ref 4.2–27.7)
LYMPHOCYTES # BLD: 1.2 K/UL (ref 0.5–4.6)
LYMPHOCYTES NFR BLD: 24 % (ref 13–44)
MCH RBC QN AUTO: 30.9 PG (ref 26.1–32.9)
MCHC RBC AUTO-ENTMCNC: 32.8 G/DL (ref 31.4–35)
MCV RBC AUTO: 94.4 FL (ref 82–102)
MONOCYTES # BLD: 0.5 K/UL (ref 0.1–1.3)
MONOCYTES NFR BLD: 10 % (ref 4–12)
NEUTS SEG # BLD: 3.3 K/UL (ref 1.7–8.2)
NEUTS SEG NFR BLD: 63 % (ref 43–78)
NRBC # BLD: 0 K/UL (ref 0–0.2)
PLATELET # BLD AUTO: 289 K/UL (ref 150–450)
PMV BLD AUTO: 8.7 FL (ref 9.4–12.3)
POTASSIUM SERPL-SCNC: 4 MMOL/L (ref 3.5–5.1)
PROT SERPL-MCNC: 7.2 G/DL (ref 6.3–8.2)
RBC # BLD AUTO: 4.27 M/UL (ref 4.05–5.2)
SODIUM SERPL-SCNC: 134 MMOL/L (ref 136–145)
WBC # BLD AUTO: 5.1 K/UL (ref 4.3–11.1)

## 2024-10-03 PROCEDURE — 84156 ASSAY OF PROTEIN URINE: CPT

## 2024-10-03 PROCEDURE — 82784 ASSAY IGA/IGD/IGG/IGM EACH: CPT

## 2024-10-03 PROCEDURE — 85025 COMPLETE CBC W/AUTO DIFF WBC: CPT

## 2024-10-03 PROCEDURE — 82306 VITAMIN D 25 HYDROXY: CPT

## 2024-10-03 PROCEDURE — 82570 ASSAY OF URINE CREATININE: CPT

## 2024-10-03 PROCEDURE — 80053 COMPREHEN METABOLIC PANEL: CPT

## 2024-10-03 PROCEDURE — 86334 IMMUNOFIX E-PHORESIS SERUM: CPT

## 2024-10-03 PROCEDURE — 36415 COLL VENOUS BLD VENIPUNCTURE: CPT

## 2024-10-03 PROCEDURE — 0077U IG PARAPROTEIN QUAL BLD/UR: CPT

## 2024-10-03 PROCEDURE — 83521 IG LIGHT CHAINS FREE EACH: CPT

## 2024-10-03 PROCEDURE — 84165 PROTEIN E-PHORESIS SERUM: CPT

## 2024-10-03 PROCEDURE — 84166 PROTEIN E-PHORESIS/URINE/CSF: CPT

## 2024-10-06 LAB
ALBUMIN SERPL ELPH-MCNC: 3.5 G/DL (ref 2.9–4.4)
ALBUMIN/GLOB SERPL: 1.1 (ref 0.7–1.7)
ALPHA1 GLOB SERPL ELPH-MCNC: 0.3 G/DL (ref 0–0.4)
ALPHA2 GLOB SERPL ELPH-MCNC: 0.8 G/DL (ref 0.4–1)
B-GLOBULIN SERPL ELPH-MCNC: 1.1 G/DL (ref 0.7–1.3)
GAMMA GLOB SERPL ELPH-MCNC: 1.3 G/DL (ref 0.4–1.8)
GLOBULIN SER-MCNC: 3.4 G/DL (ref 2.2–3.9)
IGA SERPL-MCNC: 60 MG/DL (ref 64–422)
IGG SERPL-MCNC: 1494 MG/DL (ref 586–1602)
IGM SERPL-MCNC: 20 MG/DL (ref 26–217)
INTERPRETATION SERPL IEP-IMP: ABNORMAL
M PROTEIN SERPL ELPH-MCNC: 1.1 G/DL
PROT SERPL-MCNC: 6.9 G/DL (ref 6–8.5)

## 2024-10-07 NOTE — PROGRESS NOTES
12.4 and platelets 280.  Her myeloma labs are pending.  We addressed multiple issues including her mobility.  She continues to use a walker.  She wishes she had a bit more energy and we discussed different exercises she can do to strengthen her muscles.  She is eating okay but admits to not drinking enough fluids.  We reviewed how to boost her hydration.  No other issues or concerns at this time.  -last visit's note: follow-up with her children.  She is doing okay in general.  No new aches or pains.  She did have repeat MRIs which showed some progressive inflammatory changes on her scans but according to Dr. Ya not consistent with osteomyelitis.  She also has no signs of infection at this time.  We discussed her labs in detail.  Mild rise in FLC noted.  M spike remains about the same at 0.9.  Her ratio was 91.  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  Will hold off on initiating treatment due to the side effect profile for now and plan to repeat labs in a few weeks.  She will then follow-up with NP in 2 months.  She likes this plan.    - 3/2024 - FU for MGUS.  She is doing well.  She reports having fatigue earlier in the day that improves by the afternoon.  Her right knee has been hurting her and she is due for some gel injections.  She reports losing her hearing about 6 weeks ago in her left ear and this is slowly improving.  She has had no respiratory or GI symptoms.  No fevers or night sweats.  Labs reviewed from 6/14.  M spike on urine is stable and FLC ratio is better. We will continue to monitor. She will return in 3-4 months for FU with labs. All questions were answered to the best of my ability. She will call our office with any questions/concerns.     10/2024 - Today, patient is here for follow-up with her son and daughter-in-law.  She is doing okay in general.  She had some back pain the other day but applied Voltaren gel and it resolved.  SPEP

## 2024-10-08 LAB — ALBUMIN/GLOB UR ELPH: NORMAL

## 2024-10-17 ENCOUNTER — HOSPITAL ENCOUNTER (OUTPATIENT)
Dept: LAB | Age: 89
Discharge: HOME | End: 2024-10-17
Payer: MEDICARE

## 2024-10-17 ENCOUNTER — OFFICE VISIT (OUTPATIENT)
Dept: ONCOLOGY | Age: 89
End: 2024-10-17

## 2024-10-17 VITALS
TEMPERATURE: 97.4 F | WEIGHT: 137 LBS | HEART RATE: 74 BPM | HEIGHT: 65 IN | SYSTOLIC BLOOD PRESSURE: 138 MMHG | RESPIRATION RATE: 16 BRPM | BODY MASS INDEX: 22.82 KG/M2 | DIASTOLIC BLOOD PRESSURE: 88 MMHG | OXYGEN SATURATION: 97 %

## 2024-10-17 DIAGNOSIS — E55.9 VITAMIN D DEFICIENCY: ICD-10-CM

## 2024-10-17 DIAGNOSIS — D47.2 MGUS (MONOCLONAL GAMMOPATHY OF UNKNOWN SIGNIFICANCE): Primary | ICD-10-CM

## 2024-10-17 DIAGNOSIS — H05.20 PROTRUSION OF EYEBALLS: ICD-10-CM

## 2024-10-17 LAB
T3 SERPL-MCNC: 0.66 NG/ML (ref 0.6–1.81)
T4 FREE SERPL-MCNC: 1.7 NG/DL (ref 0.9–1.7)
TSH, 3RD GENERATION: 5.53 UIU/ML (ref 0.27–4.2)

## 2024-10-17 PROCEDURE — 84439 ASSAY OF FREE THYROXINE: CPT

## 2024-10-17 PROCEDURE — 84443 ASSAY THYROID STIM HORMONE: CPT

## 2024-10-17 PROCEDURE — 36415 COLL VENOUS BLD VENIPUNCTURE: CPT

## 2024-10-17 PROCEDURE — 84480 ASSAY TRIIODOTHYRONINE (T3): CPT

## 2024-10-17 RX ORDER — LIFITEGRAST 50 MG/ML
SOLUTION/ DROPS OPHTHALMIC
COMMUNITY
Start: 2024-03-19

## 2024-10-17 RX ORDER — GABAPENTIN 100 MG/1
CAPSULE ORAL
COMMUNITY
Start: 2024-09-27

## 2024-10-17 ASSESSMENT — PATIENT HEALTH QUESTIONNAIRE - PHQ9
1. LITTLE INTEREST OR PLEASURE IN DOING THINGS: NOT AT ALL
2. FEELING DOWN, DEPRESSED OR HOPELESS: NOT AT ALL
SUM OF ALL RESPONSES TO PHQ QUESTIONS 1-9: 0
SUM OF ALL RESPONSES TO PHQ9 QUESTIONS 1 & 2: 0

## 2024-10-17 NOTE — PATIENT INSTRUCTIONS
Patient Information from Today's Visit    The members of your Oncology Medical Home are listed below:    Physician Provider: Krista Mueller, Medical Oncologist  Advanced Practice Clinician: Italia Hernandez NP  Registered Nurse: Luz HERNANDEZ RN  Navigator: N/A  Medical Assistant: Ofelia QUINTANA MA  : Abril MARQUEZ   Supportive Care Services: Melissa ELIAS LMSW    Diagnosis: MGUS      Follow Up Instructions: 4 months.    Labs reviewed.  Symptoms reviewed.  Recommend a 24 hour urine collection.  Discussed possibility of repeating PET.  We will check your TSH today.    Treatment Summary has been discussed and given to patient:N/A      Current Labs:   Hospital Outpatient Visit on 10/03/2024   Component Date Value Ref Range Status    KAPPA FREE LIGHT CHAIN 10/03/2024 1,084.0 (H)  2.4 - 20.7 mg/L Final    LAMBDA FREE LIGHT CHAIN 10/03/2024 18.1  4.2 - 27.7 mg/L Final    Kappa/Lambda Fluid C Ratio 10/03/2024 59.9 (H)  0.2 - 0.8   Final    IgG, Serum 10/03/2024 1,494  586 - 1,602 mg/dL Final    IgA 10/03/2024 60 (L)  64 - 422 mg/dL Final    IgM 10/03/2024 20 (L)  26 - 217 mg/dL Final    Comment: (NOTE)  Result confirmed on concentration.      Total Protein 10/03/2024 6.9  6.0 - 8.5 g/dL Final    Albumin 10/03/2024 3.5  2.9 - 4.4 g/dL Final    Alpha-1-Globulin 10/03/2024 0.3  0.0 - 0.4 g/dL Final    Alpha-2-Globulin 10/03/2024 0.8  0.4 - 1.0 g/dL Final    Beta Globulin 10/03/2024 1.1  0.7 - 1.3 g/dL Final    Gamma Globulin 10/03/2024 1.3  0.4 - 1.8 g/dL Final    M-Lm 10/03/2024 1.1 (H)  Not Observed g/dL Final    Globulin 10/03/2024 3.4  2.2 - 3.9 g/dL Final    A/G Ratio 10/03/2024 1.1  0.7 - 1.7   Final    IMMUNOFIXATION RESULT 10/03/2024 Comment (A)    Final    Comment: (NOTE)  Immunofixation shows IgG monoclonal protein with kappa light chain  specificity.  PLEASE NOTE:  Samples from patients receiving DARZALEX(R) (daratumumab) or  SARCLISA(R)(isatuximab-irfc) treatment can appear as an  \"IgG kappa\" and mask a

## 2024-10-22 PROBLEM — H05.20: Status: ACTIVE | Noted: 2024-10-22

## 2024-10-24 ENCOUNTER — HOSPITAL ENCOUNTER (OUTPATIENT)
Dept: LAB | Age: 89
Setting detail: SPECIMEN
Discharge: HOME | End: 2024-10-24
Payer: MEDICARE

## 2024-10-24 ENCOUNTER — HOSPITAL ENCOUNTER (OUTPATIENT)
Dept: LAB | Age: 89
Discharge: HOME | End: 2024-10-24
Payer: MEDICARE

## 2024-10-24 DIAGNOSIS — D47.2 MGUS (MONOCLONAL GAMMOPATHY OF UNKNOWN SIGNIFICANCE): ICD-10-CM

## 2024-10-24 PROCEDURE — 84156 ASSAY OF PROTEIN URINE: CPT

## 2024-10-24 PROCEDURE — 84166 PROTEIN E-PHORESIS/URINE/CSF: CPT

## 2024-10-24 PROCEDURE — 86335 IMMUNFIX E-PHORSIS/URINE/CSF: CPT

## 2024-10-30 LAB
ALBUMIN 24H MFR UR ELPH: 11.7 %
ALPHA1 GLOB 24H MFR UR ELPH: 2.8 %
ALPHA2 GLOB 24H MFR UR ELPH: 4.7 %
B-GLOBULIN MFR UR ELPH: 14.4 %
COLLECT DURATION TIME UR: 24 HR
GAMMA GLOB 24H MFR UR ELPH: 66.3 %
INTERPRETATION UR IFE-IMP: ABNORMAL
Lab: ABNORMAL
M PROTEIN 24H MFR UR ELPH: 47.5 %
M PROTEIN 24H UR ELPH-MRATE: 46 MG/24 HR
PROT 24H UR-MRATE: 96 MG/24 HR (ref 30–150)
PROT UR-MCNC: 13.7 MG/DL
SPECIMEN VOL ?TM UR: 700 ML

## 2024-12-03 ENCOUNTER — TELEPHONE (OUTPATIENT)
Age: 88
End: 2024-12-03

## 2024-12-03 DIAGNOSIS — I10 PRIMARY HYPERTENSION: ICD-10-CM

## 2024-12-03 RX ORDER — METOPROLOL SUCCINATE 50 MG/1
50 TABLET, EXTENDED RELEASE ORAL
Qty: 90 TABLET | Refills: 3 | Status: SHIPPED | OUTPATIENT
Start: 2024-12-03 | End: 2024-12-06 | Stop reason: SDUPTHER

## 2024-12-03 NOTE — TELEPHONE ENCOUNTER
MEDICATION REFILL REQUEST          Name of Medication:  Metoprolol   Dose:  50 mg   Frequency:  once a day         [facility] [place] [address]   Phone number: [phone]   Fax Number: [phone]

## 2024-12-03 NOTE — TELEPHONE ENCOUNTER
Prescription sent to [facility]  Requested Prescriptions     Signed Prescriptions Disp Refills    metoprolol succinate (TOPROL XL) 50 MG extended release tablet 90 tablet 3     Sig: Take 1 tablet by mouth nightly     Authorizing Provider: BECKI MCKEON     Ordering User: OPAL VALENZUELA

## 2024-12-05 PROBLEM — I48.19 PERSISTENT ATRIAL FIBRILLATION (HCC): Status: RESOLVED | Noted: 2023-10-25 | Resolved: 2024-12-05

## 2024-12-05 PROBLEM — M75.50 BURSITIS OF SHOULDER: Status: RESOLVED | Noted: 2020-11-17 | Resolved: 2024-12-05

## 2024-12-05 PROBLEM — R93.89 ABNORMAL FINDING OF DIAGNOSTIC IMAGING: Status: RESOLVED | Noted: 2023-08-15 | Resolved: 2024-12-05

## 2024-12-05 PROBLEM — H81.10 BENIGN PAROXYSMAL POSITIONAL VERTIGO: Status: RESOLVED | Noted: 2018-06-21 | Resolved: 2024-12-05

## 2024-12-05 PROBLEM — E78.1 HYPERTRIGLYCERIDEMIA: Status: RESOLVED | Noted: 2017-08-07 | Resolved: 2024-12-05

## 2024-12-05 PROBLEM — R93.5 ABNORMAL FINDINGS ON DIAGNOSTIC IMAGING OF ABDOMEN: Status: RESOLVED | Noted: 2022-12-31 | Resolved: 2024-12-05

## 2024-12-05 NOTE — PROGRESS NOTES
sounds: Murmur heard.   Pulmonary:      Breath sounds: Normal breath sounds. No wheezing.   Abdominal:      General: There is no distension.      Palpations: Abdomen is soft.   Musculoskeletal:         General: No swelling.   Skin:     General: Skin is warm and dry.   Neurological:      General: No focal deficit present.   Psychiatric:         Mood and Affect: Mood normal.         Medical problems and test results were reviewed with the patient today.       Lab Results   Component Value Date    WBC 5.1 10/03/2024    HGB 13.2 10/03/2024    HCT 40.3 10/03/2024    MCV 94.4 10/03/2024     10/03/2024       Lab Results   Component Value Date/Time     10/03/2024 10:51 AM    K 4.0 10/03/2024 10:51 AM    CL 97 10/03/2024 10:51 AM    CO2 27 10/03/2024 10:51 AM    BUN 15 10/03/2024 10:51 AM    CREATININE 0.81 10/03/2024 10:51 AM    GLUCOSE 95 10/03/2024 10:51 AM    CALCIUM 10.1 10/03/2024 10:51 AM        Lab Results   Component Value Date    CHOL 274 (H) 07/15/2021     Lab Results   Component Value Date    TRIG 140 07/15/2021     Lab Results   Component Value Date    HDL 59 07/15/2021     No components found for: \"LDLCHOLESTEROL\", \"LDLCALC\"  Lab Results   Component Value Date    VLDL 25 07/15/2021     No results found for: \"CHOLHDLRATIO\"     Data from outside records/labs from outside providers have been reviewed and summarized as noted in the HPI, past history and data review sections of this note       ASSESSMENT and PLAN    Madalyn was seen today for atrial fibrillation and hypertension.    Diagnoses and all orders for this visit:    Longstanding persistent atrial fibrillation (HCC)    Primary hypertension  -     indapamide (LOZOL) 1.25 MG tablet; Take 1 tablet by mouth daily  -     metoprolol succinate (TOPROL XL) 50 MG extended release tablet; Take 1 tablet by mouth nightly    Nonrheumatic aortic valve insufficiency    Presence of biventricular cardiac pacemaker    Hemarthrosis    Other orders  -     apixaban

## 2024-12-06 ENCOUNTER — OFFICE VISIT (OUTPATIENT)
Age: 88
End: 2024-12-06

## 2024-12-06 VITALS
HEIGHT: 64 IN | BODY MASS INDEX: 23.9 KG/M2 | DIASTOLIC BLOOD PRESSURE: 86 MMHG | HEART RATE: 79 BPM | WEIGHT: 140 LBS | SYSTOLIC BLOOD PRESSURE: 141 MMHG

## 2024-12-06 DIAGNOSIS — M25.00 HEMARTHROSIS: ICD-10-CM

## 2024-12-06 DIAGNOSIS — I35.1 NONRHEUMATIC AORTIC VALVE INSUFFICIENCY: ICD-10-CM

## 2024-12-06 DIAGNOSIS — I48.11 LONGSTANDING PERSISTENT ATRIAL FIBRILLATION (HCC): Primary | ICD-10-CM

## 2024-12-06 DIAGNOSIS — I10 PRIMARY HYPERTENSION: Chronic | ICD-10-CM

## 2024-12-06 DIAGNOSIS — Z95.0 PRESENCE OF BIVENTRICULAR CARDIAC PACEMAKER: ICD-10-CM

## 2024-12-06 RX ORDER — INDAPAMIDE 1.25 MG/1
1.25 TABLET ORAL DAILY
Qty: 90 TABLET | Refills: 3 | Status: SHIPPED | OUTPATIENT
Start: 2024-12-06

## 2024-12-06 RX ORDER — METOPROLOL SUCCINATE 50 MG/1
50 TABLET, EXTENDED RELEASE ORAL
Qty: 90 TABLET | Refills: 3 | Status: SHIPPED | OUTPATIENT
Start: 2024-12-06

## 2024-12-06 ASSESSMENT — ENCOUNTER SYMPTOMS: SHORTNESS OF BREATH: 0

## 2025-02-10 ENCOUNTER — HOSPITAL ENCOUNTER (OUTPATIENT)
Dept: LAB | Age: 89
Discharge: HOME | End: 2025-02-10
Payer: MEDICARE

## 2025-02-10 DIAGNOSIS — D47.2 MGUS (MONOCLONAL GAMMOPATHY OF UNKNOWN SIGNIFICANCE): ICD-10-CM

## 2025-02-10 LAB
ALBUMIN SERPL-MCNC: 3 G/DL (ref 3.2–4.6)
ALBUMIN/GLOB SERPL: 0.8 (ref 1–1.9)
ALP SERPL-CCNC: 84 U/L (ref 35–104)
ALT SERPL-CCNC: 9 U/L (ref 8–45)
ANION GAP SERPL CALC-SCNC: 10 MMOL/L (ref 7–16)
AST SERPL-CCNC: 16 U/L (ref 15–37)
BASOPHILS # BLD: 0.03 K/UL (ref 0–0.2)
BASOPHILS NFR BLD: 0.5 % (ref 0–2)
BILIRUB SERPL-MCNC: 0.3 MG/DL (ref 0–1.2)
BUN SERPL-MCNC: 19 MG/DL (ref 8–23)
CALCIUM SERPL-MCNC: 10.2 MG/DL (ref 8.8–10.2)
CHLORIDE SERPL-SCNC: 100 MMOL/L (ref 98–107)
CO2 SERPL-SCNC: 25 MMOL/L (ref 20–29)
CREAT SERPL-MCNC: 0.97 MG/DL (ref 0.6–1.1)
DIFFERENTIAL METHOD BLD: ABNORMAL
EOSINOPHIL # BLD: 0.09 K/UL (ref 0–0.8)
EOSINOPHIL NFR BLD: 1.4 % (ref 0.5–7.8)
ERYTHROCYTE [DISTWIDTH] IN BLOOD BY AUTOMATED COUNT: 15.2 % (ref 11.9–14.6)
GLOBULIN SER CALC-MCNC: 3.8 G/DL (ref 2.3–3.5)
GLUCOSE SERPL-MCNC: 103 MG/DL (ref 70–99)
HCT VFR BLD AUTO: 38.1 % (ref 35.8–46.3)
HGB BLD-MCNC: 12.3 G/DL (ref 11.7–15.4)
IMM GRANULOCYTES # BLD AUTO: 0.04 K/UL (ref 0–0.5)
IMM GRANULOCYTES NFR BLD AUTO: 0.6 % (ref 0–5)
KAPPA LC FREE SER-MCNC: 1226 MG/L (ref 2.4–20.7)
KAPPA LC FREE/LAMBDA FREE SER: 57.3 (ref 0.2–0.8)
LAMBDA LC FREE SERPL-MCNC: 21.4 MG/L (ref 4.2–27.7)
LYMPHOCYTES # BLD: 1.15 K/UL (ref 0.5–4.6)
LYMPHOCYTES NFR BLD: 18.1 % (ref 13–44)
MCH RBC QN AUTO: 30.9 PG (ref 26.1–32.9)
MCHC RBC AUTO-ENTMCNC: 32.3 G/DL (ref 31.4–35)
MCV RBC AUTO: 95.7 FL (ref 82–102)
MONOCYTES # BLD: 0.67 K/UL (ref 0.1–1.3)
MONOCYTES NFR BLD: 10.6 % (ref 4–12)
NEUTS SEG # BLD: 4.37 K/UL (ref 1.7–8.2)
NEUTS SEG NFR BLD: 68.8 % (ref 43–78)
NRBC # BLD: 0 K/UL (ref 0–0.2)
PLATELET # BLD AUTO: 270 K/UL (ref 150–450)
PMV BLD AUTO: 9.6 FL (ref 9.4–12.3)
POTASSIUM SERPL-SCNC: 3.9 MMOL/L (ref 3.5–5.1)
PROT SERPL-MCNC: 6.8 G/DL (ref 6.3–8.2)
RBC # BLD AUTO: 3.98 M/UL (ref 4.05–5.2)
SODIUM SERPL-SCNC: 135 MMOL/L (ref 136–145)
WBC # BLD AUTO: 6.4 K/UL (ref 4.3–11.1)

## 2025-02-10 PROCEDURE — 85025 COMPLETE CBC W/AUTO DIFF WBC: CPT

## 2025-02-10 PROCEDURE — 83521 IG LIGHT CHAINS FREE EACH: CPT

## 2025-02-10 PROCEDURE — 80053 COMPREHEN METABOLIC PANEL: CPT

## 2025-02-20 ENCOUNTER — TELEPHONE (OUTPATIENT)
Dept: RADIATION ONCOLOGY | Age: 89
End: 2025-02-20

## 2025-02-20 ENCOUNTER — NURSE ONLY (OUTPATIENT)
Age: 89
End: 2025-02-20

## 2025-02-20 DIAGNOSIS — I48.11 LONGSTANDING PERSISTENT ATRIAL FIBRILLATION (HCC): Primary | ICD-10-CM

## 2025-02-20 NOTE — TELEPHONE ENCOUNTER
Physician provider: Dr. Mueller  Reason for today's call (Please detail here patients chief complaint): Need to rs appt  Last office visit:10/17/24  Patient Callback Number: [phone]  Was callback number verified?: Yes  Preferred pharmacy (If refill request): na  Calls to office within the last 48 hours?:No    Patient notified that their information will be routed to the [facility] clinical triage team for review. Patient is advised that they will receive a phone call from the triage department. If symptom related and symptoms worsen before receiving a call back, the patient has been advised to proceed to the nearest ED.

## 2025-04-21 DIAGNOSIS — D47.2 IGG MONOCLONAL PROTEIN DISORDER: ICD-10-CM

## 2025-04-21 DIAGNOSIS — E55.9 VITAMIN D DEFICIENCY: ICD-10-CM

## 2025-04-21 DIAGNOSIS — D47.2 MGUS (MONOCLONAL GAMMOPATHY OF UNKNOWN SIGNIFICANCE): Primary | ICD-10-CM

## 2025-04-22 ENCOUNTER — HOSPITAL ENCOUNTER (OUTPATIENT)
Dept: LAB | Age: 89
Discharge: HOME | End: 2025-04-22
Payer: MEDICARE

## 2025-04-22 ENCOUNTER — OFFICE VISIT (OUTPATIENT)
Dept: ONCOLOGY | Age: 89
End: 2025-04-22
Payer: MEDICARE

## 2025-04-22 VITALS
DIASTOLIC BLOOD PRESSURE: 87 MMHG | TEMPERATURE: 97.3 F | SYSTOLIC BLOOD PRESSURE: 145 MMHG | OXYGEN SATURATION: 98 % | HEIGHT: 65 IN | BODY MASS INDEX: 22.92 KG/M2 | WEIGHT: 137.6 LBS | HEART RATE: 78 BPM

## 2025-04-22 DIAGNOSIS — D47.2 MGUS (MONOCLONAL GAMMOPATHY OF UNKNOWN SIGNIFICANCE): ICD-10-CM

## 2025-04-22 DIAGNOSIS — E55.9 VITAMIN D DEFICIENCY: ICD-10-CM

## 2025-04-22 DIAGNOSIS — R53.82 CHRONIC FATIGUE: ICD-10-CM

## 2025-04-22 DIAGNOSIS — D47.2 MGUS (MONOCLONAL GAMMOPATHY OF UNKNOWN SIGNIFICANCE): Primary | ICD-10-CM

## 2025-04-22 LAB
25(OH)D3 SERPL-MCNC: 95.3 NG/ML (ref 30–100)
ALBUMIN SERPL-MCNC: 3.3 G/DL (ref 3.2–4.6)
ALBUMIN/GLOB SERPL: 0.9 (ref 1–1.9)
ALP SERPL-CCNC: 81 U/L (ref 35–104)
ALT SERPL-CCNC: 11 U/L (ref 8–45)
ANION GAP SERPL CALC-SCNC: 12 MMOL/L (ref 7–16)
AST SERPL-CCNC: 19 U/L (ref 15–37)
BASOPHILS # BLD: 0.03 K/UL (ref 0–0.2)
BASOPHILS NFR BLD: 0.5 % (ref 0–2)
BILIRUB SERPL-MCNC: 0.5 MG/DL (ref 0–1.2)
BUN SERPL-MCNC: 18 MG/DL (ref 8–23)
CALCIUM SERPL-MCNC: 10.6 MG/DL (ref 8.8–10.2)
CHLORIDE SERPL-SCNC: 99 MMOL/L (ref 98–107)
CO2 SERPL-SCNC: 26 MMOL/L (ref 20–29)
CREAT SERPL-MCNC: 0.84 MG/DL (ref 0.6–1.1)
DIFFERENTIAL METHOD BLD: ABNORMAL
EOSINOPHIL # BLD: 0.08 K/UL (ref 0–0.8)
EOSINOPHIL NFR BLD: 1.4 % (ref 0.5–7.8)
ERYTHROCYTE [DISTWIDTH] IN BLOOD BY AUTOMATED COUNT: 14.6 % (ref 11.9–14.6)
GLOBULIN SER CALC-MCNC: 3.7 G/DL (ref 2.3–3.5)
GLUCOSE SERPL-MCNC: 91 MG/DL (ref 70–99)
HCT VFR BLD AUTO: 37.8 % (ref 35.8–46.3)
HGB BLD-MCNC: 12 G/DL (ref 11.7–15.4)
IMM GRANULOCYTES # BLD AUTO: 0.03 K/UL (ref 0–0.5)
IMM GRANULOCYTES NFR BLD AUTO: 0.5 % (ref 0–5)
KAPPA LC FREE SER-MCNC: 1360 MG/L (ref 2.4–20.7)
KAPPA LC FREE/LAMBDA FREE SER: 64.5 (ref 0.2–0.8)
LAMBDA LC FREE SERPL-MCNC: 21.1 MG/L (ref 4.2–27.7)
LYMPHOCYTES # BLD: 1.29 K/UL (ref 0.5–4.6)
LYMPHOCYTES NFR BLD: 22.2 % (ref 13–44)
MCH RBC QN AUTO: 30.7 PG (ref 26.1–32.9)
MCHC RBC AUTO-ENTMCNC: 31.7 G/DL (ref 31.4–35)
MCV RBC AUTO: 96.7 FL (ref 82–102)
MONOCYTES # BLD: 0.61 K/UL (ref 0.1–1.3)
MONOCYTES NFR BLD: 10.5 % (ref 4–12)
NEUTS SEG # BLD: 3.78 K/UL (ref 1.7–8.2)
NEUTS SEG NFR BLD: 64.9 % (ref 43–78)
NRBC # BLD: 0 K/UL (ref 0–0.2)
PLATELET # BLD AUTO: 263 K/UL (ref 150–450)
PMV BLD AUTO: 9.3 FL (ref 9.4–12.3)
POTASSIUM SERPL-SCNC: 4.3 MMOL/L (ref 3.5–5.1)
PROT SERPL-MCNC: 7 G/DL (ref 6.3–8.2)
RBC # BLD AUTO: 3.91 M/UL (ref 4.05–5.2)
SODIUM SERPL-SCNC: 137 MMOL/L (ref 136–145)
WBC # BLD AUTO: 5.8 K/UL (ref 4.3–11.1)

## 2025-04-22 PROCEDURE — 83521 IG LIGHT CHAINS FREE EACH: CPT

## 2025-04-22 PROCEDURE — 1090F PRES/ABSN URINE INCON ASSESS: CPT | Performed by: NURSE PRACTITIONER

## 2025-04-22 PROCEDURE — 85025 COMPLETE CBC W/AUTO DIFF WBC: CPT

## 2025-04-22 PROCEDURE — 1160F RVW MEDS BY RX/DR IN RCRD: CPT | Performed by: NURSE PRACTITIONER

## 2025-04-22 PROCEDURE — G8427 DOCREV CUR MEDS BY ELIG CLIN: HCPCS | Performed by: NURSE PRACTITIONER

## 2025-04-22 PROCEDURE — 80053 COMPREHEN METABOLIC PANEL: CPT

## 2025-04-22 PROCEDURE — 1125F AMNT PAIN NOTED PAIN PRSNT: CPT | Performed by: NURSE PRACTITIONER

## 2025-04-22 PROCEDURE — 99214 OFFICE O/P EST MOD 30 MIN: CPT | Performed by: NURSE PRACTITIONER

## 2025-04-22 PROCEDURE — G8420 CALC BMI NORM PARAMETERS: HCPCS | Performed by: NURSE PRACTITIONER

## 2025-04-22 PROCEDURE — 1123F ACP DISCUSS/DSCN MKR DOCD: CPT | Performed by: NURSE PRACTITIONER

## 2025-04-22 PROCEDURE — 1036F TOBACCO NON-USER: CPT | Performed by: NURSE PRACTITIONER

## 2025-04-22 PROCEDURE — 82306 VITAMIN D 25 HYDROXY: CPT

## 2025-04-22 PROCEDURE — 1159F MED LIST DOCD IN RCRD: CPT | Performed by: NURSE PRACTITIONER

## 2025-04-22 PROCEDURE — 36415 COLL VENOUS BLD VENIPUNCTURE: CPT

## 2025-04-22 PROCEDURE — 0077U IG PARAPROTEIN QUAL BLD/UR: CPT

## 2025-04-22 PROCEDURE — 82784 ASSAY IGA/IGD/IGG/IGM EACH: CPT

## 2025-04-22 ASSESSMENT — ENCOUNTER SYMPTOMS
TROUBLE SWALLOWING: 0
SCLERAL ICTERUS: 0
HEMOPTYSIS: 0
ABDOMINAL PAIN: 0
NAUSEA: 0
ABDOMINAL DISTENTION: 0
VOMITING: 0
CONSTIPATION: 0
DIARRHEA: 0
CHEST TIGHTNESS: 0
SORE THROAT: 0
SHORTNESS OF BREATH: 0
WHEEZING: 0
BLOOD IN STOOL: 0
VOICE CHANGE: 0

## 2025-04-22 NOTE — PROGRESS NOTES
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   Presenting Symptoms: No relevant physical symptoms reported.     History of Present Illness:  Ms. Keen is a 89 y.o. female who presents today for follow up regarding MGUS.  The past medical history is significant for vitamin D deficiency, PAF on AC, osteopenia, osteoarthritis,  occlusion and stenosis of carotid artery, HLD, mitral valve disorder, malignant neoplasm of rectosigmoid junction, IBS, HTN,  ovarian cancer, colon polyps, Graves disease, GERD, diverticulosis, bilateral carotid artery stenosis, aortic regurgitation, anxiety disorder, total colectomy, tonsillectomy, thyroidectomy, ROSALIA/BSO,  small bowel resection, heart catheterization, cyst I&D, cataract removal, vitrectomy, and orthopedic surgery.  She was evaluated in consultation by Rheumatologist, Dr. Aaron Kapadia, on 11/4/21 after being referred by her PCP for evaluation of osteoporosis.   Review of systems and physical exam were unremarkable.  She was treated with Prolia with her first injection on 11/4/21 with plans for 60 mg Prolia injection every 6 months.  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  Ms. Keen was subsequently referred to [facility], per Rheumatology, for hematology evaluation and treatment of  monoclonal gammopathy.   - We had a lengthy discussion regarding the pathophysiology of plasma cell disorders going over the spectrum of plasma cell diseases utilizing visual

## 2025-04-24 ENCOUNTER — RESULTS FOLLOW-UP (OUTPATIENT)
Dept: ONCOLOGY | Age: 89
End: 2025-04-24

## 2025-04-24 LAB — IMMUNOLOGIST REVIEW: NORMAL

## 2025-05-01 ENCOUNTER — TELEPHONE (OUTPATIENT)
Age: 89
End: 2025-05-01

## 2025-05-01 NOTE — TELEPHONE ENCOUNTER
Patient called wanting to know if she could take the dual action Advil which has Ibuprofen and tylenol in it. One of the techs at the assisted living told her about it. Patient informed that she is on Eliquis and any NSAIDs should not be taken with Eliquis. Patient states that the \"doctor told me I  could take Advil 2 a week\". Patient informed that she should only take what her doctors prescribe. Patient states that she will talk with Dr. Mix at appointment.//leonie

## 2025-05-01 NOTE — TELEPHONE ENCOUNTER
Patient said call back on the same number She wants to know can she take Advil Dual action It has ASA 125mg and Acetomorphine 250mg

## 2025-05-27 ENCOUNTER — OFFICE VISIT (OUTPATIENT)
Dept: RHEUMATOLOGY | Age: 89
End: 2025-05-27
Payer: MEDICARE

## 2025-05-27 ENCOUNTER — CLINICAL SUPPORT (OUTPATIENT)
Dept: RHEUMATOLOGY | Age: 89
End: 2025-05-27
Payer: MEDICARE

## 2025-05-27 VITALS — TEMPERATURE: 97.1 F | DIASTOLIC BLOOD PRESSURE: 82 MMHG | SYSTOLIC BLOOD PRESSURE: 120 MMHG | HEART RATE: 80 BPM

## 2025-05-27 VITALS
BODY MASS INDEX: 23.03 KG/M2 | DIASTOLIC BLOOD PRESSURE: 82 MMHG | HEIGHT: 65 IN | HEART RATE: 80 BPM | OXYGEN SATURATION: 97 % | WEIGHT: 138.2 LBS | SYSTOLIC BLOOD PRESSURE: 120 MMHG

## 2025-05-27 DIAGNOSIS — M81.0 OSTEOPOROSIS WITHOUT CURRENT PATHOLOGICAL FRACTURE, UNSPECIFIED OSTEOPOROSIS TYPE: Primary | Chronic | ICD-10-CM

## 2025-05-27 DIAGNOSIS — E55.9 VITAMIN D DEFICIENCY: Chronic | ICD-10-CM

## 2025-05-27 DIAGNOSIS — M81.0 AGE-RELATED OSTEOPOROSIS WITHOUT CURRENT PATHOLOGICAL FRACTURE: Primary | ICD-10-CM

## 2025-05-27 DIAGNOSIS — E83.52 HYPERCALCEMIA: Chronic | ICD-10-CM

## 2025-05-27 PROCEDURE — 1090F PRES/ABSN URINE INCON ASSESS: CPT | Performed by: INTERNAL MEDICINE

## 2025-05-27 PROCEDURE — 1036F TOBACCO NON-USER: CPT | Performed by: INTERNAL MEDICINE

## 2025-05-27 PROCEDURE — 1123F ACP DISCUSS/DSCN MKR DOCD: CPT | Performed by: INTERNAL MEDICINE

## 2025-05-27 PROCEDURE — 1159F MED LIST DOCD IN RCRD: CPT | Performed by: INTERNAL MEDICINE

## 2025-05-27 PROCEDURE — G8427 DOCREV CUR MEDS BY ELIG CLIN: HCPCS | Performed by: INTERNAL MEDICINE

## 2025-05-27 PROCEDURE — G8420 CALC BMI NORM PARAMETERS: HCPCS | Performed by: INTERNAL MEDICINE

## 2025-05-27 PROCEDURE — 96365 THER/PROPH/DIAG IV INF INIT: CPT | Performed by: INTERNAL MEDICINE

## 2025-05-27 PROCEDURE — 99214 OFFICE O/P EST MOD 30 MIN: CPT | Performed by: INTERNAL MEDICINE

## 2025-05-27 RX ORDER — ACETAMINOPHEN 325 MG/1
650 TABLET ORAL
Status: DISCONTINUED | OUTPATIENT
Start: 2025-05-27 | End: 2025-05-27 | Stop reason: HOSPADM

## 2025-05-27 RX ORDER — ZOLEDRONIC ACID 0.05 MG/ML
5 INJECTION, SOLUTION INTRAVENOUS ONCE
OUTPATIENT
Start: 2026-05-26 | End: 2026-05-26

## 2025-05-27 RX ORDER — ONDANSETRON 2 MG/ML
8 INJECTION INTRAMUSCULAR; INTRAVENOUS
OUTPATIENT
Start: 2026-05-26

## 2025-05-27 RX ORDER — EPINEPHRINE 1 MG/ML
0.3 INJECTION, SOLUTION, CONCENTRATE INTRAVENOUS PRN
Status: DISCONTINUED | OUTPATIENT
Start: 2025-05-27 | End: 2025-05-27 | Stop reason: HOSPADM

## 2025-05-27 RX ORDER — SODIUM CHLORIDE 0.9 % (FLUSH) 0.9 %
5-40 SYRINGE (ML) INJECTION PRN
OUTPATIENT
Start: 2026-05-26

## 2025-05-27 RX ORDER — SODIUM CHLORIDE 9 MG/ML
5-250 INJECTION, SOLUTION INTRAVENOUS PRN
Status: DISCONTINUED | OUTPATIENT
Start: 2025-05-27 | End: 2025-05-27 | Stop reason: HOSPADM

## 2025-05-27 RX ORDER — FAMOTIDINE 10 MG/ML
20 INJECTION, SOLUTION INTRAVENOUS
OUTPATIENT
Start: 2026-05-26

## 2025-05-27 RX ORDER — HYDROCORTISONE SODIUM SUCCINATE 100 MG/2ML
100 INJECTION INTRAMUSCULAR; INTRAVENOUS
OUTPATIENT
Start: 2026-05-26

## 2025-05-27 RX ORDER — ALBUTEROL SULFATE 90 UG/1
4 INHALANT RESPIRATORY (INHALATION) PRN
OUTPATIENT
Start: 2026-05-26

## 2025-05-27 RX ORDER — SODIUM CHLORIDE 9 MG/ML
INJECTION, SOLUTION INTRAVENOUS CONTINUOUS
OUTPATIENT
Start: 2026-05-26

## 2025-05-27 RX ORDER — SODIUM CHLORIDE 9 MG/ML
INJECTION, SOLUTION INTRAVENOUS CONTINUOUS
Status: DISCONTINUED | OUTPATIENT
Start: 2025-05-27 | End: 2025-05-27 | Stop reason: HOSPADM

## 2025-05-27 RX ORDER — HYDROCORTISONE SODIUM SUCCINATE 100 MG/2ML
100 INJECTION INTRAMUSCULAR; INTRAVENOUS
Status: DISCONTINUED | OUTPATIENT
Start: 2025-05-27 | End: 2025-05-27 | Stop reason: HOSPADM

## 2025-05-27 RX ORDER — HEPARIN SODIUM (PORCINE) LOCK FLUSH IV SOLN 100 UNIT/ML 100 UNIT/ML
500 SOLUTION INTRAVENOUS PRN
Status: DISCONTINUED | OUTPATIENT
Start: 2025-05-27 | End: 2025-05-27 | Stop reason: HOSPADM

## 2025-05-27 RX ORDER — EPINEPHRINE 1 MG/ML
0.3 INJECTION, SOLUTION, CONCENTRATE INTRAVENOUS PRN
OUTPATIENT
Start: 2026-05-26

## 2025-05-27 RX ORDER — SODIUM CHLORIDE 9 MG/ML
5-250 INJECTION, SOLUTION INTRAVENOUS PRN
OUTPATIENT
Start: 2026-05-26

## 2025-05-27 RX ORDER — ACETAMINOPHEN 325 MG/1
650 TABLET ORAL
OUTPATIENT
Start: 2026-05-26

## 2025-05-27 RX ORDER — ZOLEDRONIC ACID 0.05 MG/ML
5 INJECTION, SOLUTION INTRAVENOUS ONCE
Status: COMPLETED | OUTPATIENT
Start: 2025-05-27 | End: 2025-05-27

## 2025-05-27 RX ORDER — DIPHENHYDRAMINE HYDROCHLORIDE 50 MG/ML
50 INJECTION, SOLUTION INTRAMUSCULAR; INTRAVENOUS
Status: DISCONTINUED | OUTPATIENT
Start: 2025-05-27 | End: 2025-05-27 | Stop reason: HOSPADM

## 2025-05-27 RX ORDER — DIPHENHYDRAMINE HYDROCHLORIDE 50 MG/ML
50 INJECTION, SOLUTION INTRAMUSCULAR; INTRAVENOUS
OUTPATIENT
Start: 2026-05-26

## 2025-05-27 RX ORDER — PREGABALIN 25 MG/1
25 CAPSULE ORAL 3 TIMES DAILY
COMMUNITY
Start: 2025-04-25

## 2025-05-27 RX ORDER — HEPARIN SODIUM (PORCINE) LOCK FLUSH IV SOLN 100 UNIT/ML 100 UNIT/ML
500 SOLUTION INTRAVENOUS PRN
OUTPATIENT
Start: 2026-05-26

## 2025-05-27 RX ORDER — ONDANSETRON 2 MG/ML
8 INJECTION INTRAMUSCULAR; INTRAVENOUS
Status: DISCONTINUED | OUTPATIENT
Start: 2025-05-27 | End: 2025-05-27 | Stop reason: HOSPADM

## 2025-05-27 RX ORDER — FAMOTIDINE 10 MG/ML
20 INJECTION, SOLUTION INTRAVENOUS
Status: DISCONTINUED | OUTPATIENT
Start: 2025-05-27 | End: 2025-05-27 | Stop reason: HOSPADM

## 2025-05-27 RX ORDER — SODIUM CHLORIDE 0.9 % (FLUSH) 0.9 %
5-40 SYRINGE (ML) INJECTION PRN
Status: DISCONTINUED | OUTPATIENT
Start: 2025-05-27 | End: 2025-05-27 | Stop reason: HOSPADM

## 2025-05-27 RX ORDER — ALBUTEROL SULFATE 90 UG/1
4 INHALANT RESPIRATORY (INHALATION) PRN
Status: DISCONTINUED | OUTPATIENT
Start: 2025-05-27 | End: 2025-05-27 | Stop reason: HOSPADM

## 2025-05-27 RX ADMIN — ZOLEDRONIC ACID 5 MG: 0.05 INJECTION, SOLUTION INTRAVENOUS at 11:50

## 2025-05-27 ASSESSMENT — RHEUMATOLOGY NEW PATIENT QUESTIONNAIRE
COUGH: N
EASILY LOSING TEMPER: N
LOSS OF VISION: N
PAIN OR BURNING ON URINATION: Y
INCREASED SUSCEPTIBILITY TO INFECTION: N
SKIN TIGHTNESS: N
ANXIETY: N
SEIZURES: N
SHORTNESS OF BREATH: N
MORNING STIFFNESS: Y
FAINTING: N
AGITATION: N
JOINT PAIN: N
BEHAVIORAL CHANGES: Y
NUMBNESS OR TINGLING IN HANDS OR FEET: N
DIFFICULTY STAYING ASLEEP: N
HOW WOULD YOU DESCRIBE YOUR STIFFNESS ON AVERAGE: MILD
DRYNESS OF MOUTH: Y
VAGINAL DRYNESS: N
PERSISTENT DIARRHEA: N
UNUSUAL BLEEDING: N
DIFFICULTY SWALLOWING: N
HOARSE VOICE: N
SUN SENSITIVE (SUN ALLERGY): N
NAUSEA: N
EASY BRUISING: Y
RASH OR ULCERS: N
JAUNDICE: N
SORES IN MOUTH OR NOSE: N
UNUSUAL FATIGUE: Y
STOMACH PAIN: N
BLOOD IN STOOLS: N
MUSCLE WEAKNESS: N
VOMITING OF BLOOD OR COFFEE GROUND CONSISTENCY MATERIAL: N
BLACK STOOLS: N
HEADACHES: N
DOUBLE OR BLURRED VISION: N
FEVER: N
UNUSUALLY RAPID OR SLOWED HEART RATE: N
UNEXPLAINED WEIGHT CHANGE: N
MORNING STIFFNESS IN LOWER BACK: Y
EYE REDNESS: N
DEPRESSION: N
NODULES/BUMPS: N
SKIN REDNESS: N
UNEXPLAINED HEARING LOSS: N
DIFFICULTY BREATHING LYING DOWN: N
SWOLLEN LEGS OR FEET: Y
EYE DRYNESS: Y
JOINT SWELLING: N
NIGHT SWEATS: Y
EYE PAIN: N
ANEMIA: N
EXCESSIVE HAIR LOSS (MORE THAN YOUR NORM): N
COLOR CHANGES OF HANDS OR FEET IN THE COLD: N
DIFFICULTY FALLING ASLEEP: N
LOSS OF CONSCIOUSNESS: N
MEMORY LOSS: Y
ABNORMAL URINE: N
SWOLLEN OR TENDER GLANDS: N
HEARTBURN OR REFLUX: N
RASH: N
CHEST PAIN: N

## 2025-05-27 ASSESSMENT — ROUTINE ASSESSMENT OF PATIENT INDEX DATA (RAPID3)
ON A SCALE OF ONE TO TEN, CONSIDERING ALL THE WAYS IN WHICH ILLNESS AND HEALTH CONDITIONS MAY AFFECT YOU AT THIS TIME, PLEASE INDICATE BELOW HOW YOU ARE DOING:: 7
ON A SCALE OF ONE TO TEN, HOW MUCH PAIN HAVE YOU HAD BECAUSE OF YOUR CONDITION OVER THE PAST WEEK?: 7
ON A SCALE OF ONE TO TEN, HOW MUCH OF A PROBLEM HAS UNUSUAL FATIGUE OR TIREDNESS BEEN FOR YOU OVER THE PAST WEEK?: 7
ON A SCALE OF ONE TO TEN, HOW DIFFICULT WAS IT FOR YOU TO COMPLETE THE LISTED DAILY PHYSICAL TASKS OVER THE LAST WEEK: 1.4
WHEN YOU AWAKENED IN THE MORNING OVER THE LAST WEEK, PLEASE INDICATE THE AMOUNT OF TIME IT TAKES UNTIL YOU ARE AS LIMBER AS YOU WILL BE FOR THE DAY: > 1 HOUR

## 2025-05-27 NOTE — PROGRESS NOTES
[facility] RHEUMATOLOGY  [address] [place] [address], [address]  [place] [address]  Office : [phone], Fax: [phone]       Reclast 5mg/100ml infused today over 30 minutes for safety. 20-30 mL NaCl flush initiated post medication. Infusion tolerated well without complications. Advised patient to continue taking vitamin D & calcium post infusion. Call with any problems or side effects. Infusion placed in RAC. Pt sent into infusion room after office visit with approval from Dr. García to receive Reclast infusion today.     IV insertion time: 1148  Medication start time: 1150  Medication completion time: 1220    Patient Medication Supplied? no    Patient discharged feeling well and instructed to call the office with any post-infusion issues.    Pre-infusion/injection questionairre for osteoporosis    1. Have you had or are you planning any dental work?  No    2. Are you taking your calcium and vitamin D? Yes    3. When was your last osteoporosis treatment? 5/16/2024    4. Have you had any recent fractures? No

## 2025-05-27 NOTE — PATIENT INSTRUCTIONS
Reclast infusion today, will do 1 more dose after this  Repeat dxa scan 4/2026  Notify me if you have any planned tooth removal or implant, I need to speak with oral surgeon or dentist prior to this being performed  Notify me if there is any fracture

## 2025-05-27 NOTE — PROGRESS NOTES
Since last visit patient has been feeling: fair    Any Rheumatology medication side effects: none       Hospitalizations: none        Infections: none     Vaccinations: none        5/27/2025    10:00 AM   DMARD/Biologic   AM Stiffness > 1 hour   Pain 7   Fatigue 7   MDHAQ 1.4   Patient Global Score 7          REVIEW OF SYSTEMS:  A total of 10 systems (musculoskeletal system as stated in the HPI and the following 9 systems) were reviewed with patient today and were negative except as stated in the HPI and except for the following (depicted with an \"X\"):        \"X\" Constitutional  \"X\" HEENT /Mouth  \"X\" Cardiovascular and  Respiratory (2 systems)  \"X\" Gastrointestinal    Fever/chills   Hair loss   Shortness of breath   Upset stomach   x Falls   Dry mouth   Coughing   Diarrhea / constipation    Wt loss   Mouth sores   Wheezing   Heartburn    Wt gain   Ringing ears   Chest pain   Dark or bloody stools    Night sweats   Diff. swallowing  X None of above   Nausea or vomiting    None of above  X None of above     X None of above                \"X\" Integumentary  \"X\" Neurological  \"X\" Genitourinary  \"X\" Psychiatric    Easy bruising   Numbness/ tingling   Female problems   Depression    Rashes   Weakness   Problems with urination   Feeling anxious    Sun sensitivity   Headaches  X None of above   Problems sleeping   X None of above  X None of above     X None of above            
(HCC)     Menopause     Mitral valve disorders(424.0)     Mixed hyperlipidemia     Occlusion and stenosis of carotid artery without mention of cerebral infarction     Osteoarthritis     Osteopenia     Pacemaker     Paroxysmal atrial fibrillation (HCC)     Postmenopausal atrophic vaginitis     Primary hypothyroidism     Thyroid nodule 11/03/2022    Vaginal atrophy     Vitamin D deficiency          Current Outpatient Medications:     pregabalin (LYRICA) 25 MG capsule, Take 1 capsule by mouth 3 times daily., Disp: , Rfl:     apixaban (ELIQUIS) 2.5 MG TABS tablet, Take 1 tablet by mouth 2 times daily, Disp: 180 tablet, Rfl: 3    indapamide (LOZOL) 1.25 MG tablet, Take 1 tablet by mouth daily, Disp: 90 tablet, Rfl: 3    metoprolol succinate (TOPROL XL) 50 MG extended release tablet, Take 1 tablet by mouth nightly, Disp: 90 tablet, Rfl: 3    fluticasone (FLONASE) 50 MCG/ACT nasal spray, 2 sprays by Nasal route daily, Disp: , Rfl:     zoledronic acid (RECLAST) 5 MG/100ML SOLN, Infuse 100 mLs intravenously Had 3rd infusion in 2024, Disp: , Rfl:     LICART 1.3 % PT24, , Disp: , Rfl:     bimatoprost (LUMIGAN) 0.01 % SOLN ophthalmic drops, Place 1 drop into both eyes nightly, Disp: , Rfl:     SYNTHROID 75 MCG tablet, Take 1 tablet by mouth Daily, Disp: 90 tablet, Rfl: 3    acetaminophen (TYLENOL) 325 MG tablet, Take 2 tablets by mouth every 6 hours as needed for Pain, Disp: , Rfl:     b complex vitamins capsule, Take 1 capsule by mouth daily, Disp: , Rfl:     Brimonidine Tartrate (LUMIFY) 0.025 % SOLN, Place 1 drop into both eyes as needed, Disp: , Rfl:     mometasone (ELOCON) 0.1 % lotion, APPLY TO SCALP TWICE DAILY FOR ITCH AS NEEDED, Disp: , Rfl:     gabapentin (NEURONTIN) 100 MG capsule, , Disp: , Rfl:     XIIDRA 5 % SOLN, , Disp: , Rfl:     denosumab (PROLIA) 60 MG/ML SOSY SC injection, Inject 1 mL into the skin once yearly (Patient not taking: Reported on 5/27/2025), Disp: , Rfl:         No Known Allergies      Physical

## 2025-05-30 ENCOUNTER — OFFICE VISIT (OUTPATIENT)
Age: 89
End: 2025-05-30
Payer: MEDICARE

## 2025-05-30 ENCOUNTER — TELEPHONE (OUTPATIENT)
Age: 89
End: 2025-05-30

## 2025-05-30 VITALS
SYSTOLIC BLOOD PRESSURE: 138 MMHG | HEART RATE: 68 BPM | BODY MASS INDEX: 23.56 KG/M2 | HEIGHT: 64 IN | WEIGHT: 138 LBS | DIASTOLIC BLOOD PRESSURE: 70 MMHG

## 2025-05-30 DIAGNOSIS — I10 PRIMARY HYPERTENSION: Chronic | ICD-10-CM

## 2025-05-30 DIAGNOSIS — M25.00 HEMARTHROSIS: ICD-10-CM

## 2025-05-30 DIAGNOSIS — Z95.0 PRESENCE OF BIVENTRICULAR CARDIAC PACEMAKER: ICD-10-CM

## 2025-05-30 DIAGNOSIS — I48.11 LONGSTANDING PERSISTENT ATRIAL FIBRILLATION (HCC): Primary | ICD-10-CM

## 2025-05-30 DIAGNOSIS — I35.1 NONRHEUMATIC AORTIC VALVE INSUFFICIENCY: ICD-10-CM

## 2025-05-30 PROCEDURE — 1126F AMNT PAIN NOTED NONE PRSNT: CPT | Performed by: INTERNAL MEDICINE

## 2025-05-30 PROCEDURE — G8427 DOCREV CUR MEDS BY ELIG CLIN: HCPCS | Performed by: INTERNAL MEDICINE

## 2025-05-30 PROCEDURE — 1036F TOBACCO NON-USER: CPT | Performed by: INTERNAL MEDICINE

## 2025-05-30 PROCEDURE — G8420 CALC BMI NORM PARAMETERS: HCPCS | Performed by: INTERNAL MEDICINE

## 2025-05-30 PROCEDURE — 1090F PRES/ABSN URINE INCON ASSESS: CPT | Performed by: INTERNAL MEDICINE

## 2025-05-30 PROCEDURE — 1159F MED LIST DOCD IN RCRD: CPT | Performed by: INTERNAL MEDICINE

## 2025-05-30 PROCEDURE — 99214 OFFICE O/P EST MOD 30 MIN: CPT | Performed by: INTERNAL MEDICINE

## 2025-05-30 PROCEDURE — 1123F ACP DISCUSS/DSCN MKR DOCD: CPT | Performed by: INTERNAL MEDICINE

## 2025-05-30 RX ORDER — INDAPAMIDE 1.25 MG/1
1.25 TABLET ORAL DAILY
Qty: 90 TABLET | Refills: 3 | Status: SHIPPED | OUTPATIENT
Start: 2025-05-30

## 2025-05-30 RX ORDER — METOPROLOL SUCCINATE 50 MG/1
50 TABLET, EXTENDED RELEASE ORAL
Qty: 90 TABLET | Refills: 3 | Status: SHIPPED | OUTPATIENT
Start: 2025-05-30

## 2025-05-30 ASSESSMENT — ENCOUNTER SYMPTOMS: SHORTNESS OF BREATH: 0

## 2025-05-30 NOTE — PROGRESS NOTES
[facility] CARDIOLOGY, [address], [place] [address]  PHONE: [phone]    Madalyn Keen  3/7/1934      SUBJECTIVE:   Madalyn Keen is a 89 y.o. female seen for a follow up visit regarding the following:     Chief Complaint   Patient presents with    Hypertension    Atrial Fibrillation       HPI:    Madalyn Keen presents for routine follow. Multiple issues addressed as outlined below:     Permanent Atrial Fibrillation  Status:  No symptoms of palpitations or tachycardia.   Medications:  Toprol XL 50 mg QD.  Eliquis 2.5 mg BID.  Prior History:  Rate control:  PACE/ABLATE Strategy.     Pacemaker  Status:  Most recent interrogation with normal function.  Currently 89% RV paced.  Apparently after discussion with the device clinic it was noted in February her LV lead was not capturing and was turned off.    Prior History:     Biotronik biventricular pacemaker and AV node ablation (10/30/2023): Dr. Garcia      Hypertension  Status:  Ambulatory BP readings have been controlled.  Patient reports compliance with medical therapy without side effects.    Medications: Toprol XL 50 mg QD.  Indapamide 1.25 mg QD.   Labs: Lab in April showed creatinine 0.84.  Hemoglobin 12.  Platelet count 263.    Aortic Regurgitaiton  Status:  No CHF symptoms.  No PND,orthopnea or edema.   Prior History:     Echo (2/16/16): Mild to moderate aortic regurgitation.   2.  CLARENCE (7/11/2023): EF 60 to 65%.  Mild aortic and mitral regurgitation.  Moderate atrial enlargement.     Recurrent Hemathrosis of knee  Status:  No recent events.  She has not had any events in over a year.    PERTINENT PRIOR HISTORY:   MGUS:  Followed by Dr. Mueller.   Graves disease with Graves' Opthalmopathy  Prior Tachycardia induced Cardiomyopathy with normal cath 2015.       Past Medical History, Past Surgical History, Family history, Social History, and Medications were all reviewed with the patient today and updated as

## 2025-05-30 NOTE — TELEPHONE ENCOUNTER
Patient seen in the [place] office in 2/25 w/ BIO rep and LV lead turned off due to non capture in all vectors. Hx AVN w/ stable RV lead.     Patient w/ no complaints at this time. Aware to call for increasing issues to discuss new LV lead if needed

## 2025-06-02 ENCOUNTER — TELEPHONE (OUTPATIENT)
Dept: ONCOLOGY | Age: 89
End: 2025-06-02

## 2025-06-02 NOTE — TELEPHONE ENCOUNTER
Called and spoke with Hair Keen who is listed on [facility].  Informed ok to do labs up to 2 weeks earlier.  Verbalized understanding.

## 2025-07-17 ENCOUNTER — HOSPITAL ENCOUNTER (OUTPATIENT)
Dept: LAB | Age: 89
Discharge: HOME | End: 2025-07-17
Payer: MEDICARE

## 2025-07-17 DIAGNOSIS — R53.82 CHRONIC FATIGUE: ICD-10-CM

## 2025-07-17 DIAGNOSIS — E55.9 VITAMIN D DEFICIENCY: ICD-10-CM

## 2025-07-17 DIAGNOSIS — D47.2 MGUS (MONOCLONAL GAMMOPATHY OF UNKNOWN SIGNIFICANCE): ICD-10-CM

## 2025-07-17 LAB
25(OH)D3 SERPL-MCNC: 98.9 NG/ML (ref 30–100)
ALBUMIN SERPL-MCNC: 3.3 G/DL (ref 3.2–4.6)
ALBUMIN/GLOB SERPL: 0.9 (ref 1–1.9)
ALP SERPL-CCNC: 89 U/L (ref 35–104)
ALT SERPL-CCNC: 20 U/L (ref 8–45)
ANION GAP SERPL CALC-SCNC: 11 MMOL/L (ref 7–16)
AST SERPL-CCNC: 22 U/L (ref 15–37)
BASOPHILS # BLD: 0.04 K/UL (ref 0–0.2)
BASOPHILS NFR BLD: 0.7 % (ref 0–2)
BILIRUB SERPL-MCNC: 0.6 MG/DL (ref 0–1.2)
BUN SERPL-MCNC: 21 MG/DL (ref 8–23)
CALCIUM SERPL-MCNC: 10.5 MG/DL (ref 8.8–10.2)
CHLORIDE SERPL-SCNC: 101 MMOL/L (ref 98–107)
CO2 SERPL-SCNC: 25 MMOL/L (ref 20–29)
CREAT SERPL-MCNC: 0.95 MG/DL (ref 0.6–1.1)
DIFFERENTIAL METHOD BLD: ABNORMAL
EOSINOPHIL # BLD: 0.08 K/UL (ref 0–0.8)
EOSINOPHIL NFR BLD: 1.5 % (ref 0.5–7.8)
ERYTHROCYTE [DISTWIDTH] IN BLOOD BY AUTOMATED COUNT: 16 % (ref 11.9–14.6)
GLOBULIN SER CALC-MCNC: 3.7 G/DL (ref 2.3–3.5)
GLUCOSE SERPL-MCNC: 90 MG/DL (ref 70–99)
HCT VFR BLD AUTO: 36.3 % (ref 35.8–46.3)
HGB BLD-MCNC: 11.5 G/DL (ref 11.7–15.4)
IMM GRANULOCYTES # BLD AUTO: 0.04 K/UL (ref 0–0.5)
IMM GRANULOCYTES NFR BLD AUTO: 0.7 % (ref 0–5)
KAPPA LC FREE SER-MCNC: 1315 MG/L (ref 2.4–20.7)
KAPPA LC FREE/LAMBDA FREE SER: 61.4 (ref 0.2–0.8)
LAMBDA LC FREE SERPL-MCNC: 21.4 MG/L (ref 4.2–27.7)
LYMPHOCYTES # BLD: 1.19 K/UL (ref 0.5–4.6)
LYMPHOCYTES NFR BLD: 21.9 % (ref 13–44)
MCH RBC QN AUTO: 31 PG (ref 26.1–32.9)
MCHC RBC AUTO-ENTMCNC: 31.7 G/DL (ref 31.4–35)
MCV RBC AUTO: 97.8 FL (ref 82–102)
MONOCYTES # BLD: 0.58 K/UL (ref 0.1–1.3)
MONOCYTES NFR BLD: 10.7 % (ref 4–12)
NEUTS SEG # BLD: 3.5 K/UL (ref 1.7–8.2)
NEUTS SEG NFR BLD: 64.5 % (ref 43–78)
NRBC # BLD: 0 K/UL (ref 0–0.2)
PLATELET # BLD AUTO: 227 K/UL (ref 150–450)
PMV BLD AUTO: 9 FL (ref 9.4–12.3)
POTASSIUM SERPL-SCNC: 4.4 MMOL/L (ref 3.5–5.1)
PROT SERPL-MCNC: 7 G/DL (ref 6.3–8.2)
RBC # BLD AUTO: 3.71 M/UL (ref 4.05–5.2)
SODIUM SERPL-SCNC: 137 MMOL/L (ref 136–145)
WBC # BLD AUTO: 5.4 K/UL (ref 4.3–11.1)

## 2025-07-17 PROCEDURE — 36415 COLL VENOUS BLD VENIPUNCTURE: CPT

## 2025-07-17 PROCEDURE — 83521 IG LIGHT CHAINS FREE EACH: CPT

## 2025-07-17 PROCEDURE — 82306 VITAMIN D 25 HYDROXY: CPT

## 2025-07-17 PROCEDURE — 85025 COMPLETE CBC W/AUTO DIFF WBC: CPT

## 2025-07-17 PROCEDURE — 0077U IG PARAPROTEIN QUAL BLD/UR: CPT

## 2025-07-17 PROCEDURE — 82784 ASSAY IGA/IGD/IGG/IGM EACH: CPT

## 2025-07-17 PROCEDURE — 80053 COMPREHEN METABOLIC PANEL: CPT

## 2025-07-21 LAB — IMMUNOLOGIST REVIEW: NORMAL

## 2025-07-31 ENCOUNTER — HOSPITAL ENCOUNTER (OUTPATIENT)
Dept: LAB | Age: 89
Discharge: HOME | End: 2025-07-31
Payer: MEDICARE

## 2025-07-31 ENCOUNTER — OFFICE VISIT (OUTPATIENT)
Dept: ONCOLOGY | Age: 89
End: 2025-07-31
Payer: MEDICARE

## 2025-07-31 VITALS
HEIGHT: 65 IN | TEMPERATURE: 97.5 F | DIASTOLIC BLOOD PRESSURE: 81 MMHG | OXYGEN SATURATION: 98 % | SYSTOLIC BLOOD PRESSURE: 155 MMHG | BODY MASS INDEX: 23.16 KG/M2 | WEIGHT: 139 LBS | RESPIRATION RATE: 16 BRPM | HEART RATE: 81 BPM

## 2025-07-31 DIAGNOSIS — D47.2 MGUS (MONOCLONAL GAMMOPATHY OF UNKNOWN SIGNIFICANCE): Primary | ICD-10-CM

## 2025-07-31 DIAGNOSIS — D64.9 ANEMIA, UNSPECIFIED TYPE: ICD-10-CM

## 2025-07-31 DIAGNOSIS — R53.82 CHRONIC FATIGUE: ICD-10-CM

## 2025-07-31 DIAGNOSIS — E55.9 VITAMIN D DEFICIENCY: ICD-10-CM

## 2025-07-31 LAB
FERRITIN SERPL-MCNC: 89 NG/ML (ref 8–388)
FOLATE SERPL-MCNC: >40 NG/ML (ref 3.1–17.5)
IRON SATN MFR SERPL: 29 % (ref 20–50)
IRON SERPL-MCNC: 103 UG/DL (ref 35–100)
TIBC SERPL-MCNC: 350 UG/DL (ref 240–450)
TSH, 3RD GENERATION: 0.59 UIU/ML (ref 0.27–4.2)
UIBC SERPL-MCNC: 247 UG/DL (ref 112–347)
VIT B12 SERPL-MCNC: 2649 PG/ML (ref 193–986)

## 2025-07-31 PROCEDURE — 1126F AMNT PAIN NOTED NONE PRSNT: CPT | Performed by: INTERNAL MEDICINE

## 2025-07-31 PROCEDURE — 99214 OFFICE O/P EST MOD 30 MIN: CPT | Performed by: INTERNAL MEDICINE

## 2025-07-31 PROCEDURE — 83550 IRON BINDING TEST: CPT

## 2025-07-31 PROCEDURE — 36415 COLL VENOUS BLD VENIPUNCTURE: CPT

## 2025-07-31 PROCEDURE — 82607 VITAMIN B-12: CPT

## 2025-07-31 PROCEDURE — 1159F MED LIST DOCD IN RCRD: CPT | Performed by: INTERNAL MEDICINE

## 2025-07-31 PROCEDURE — 1160F RVW MEDS BY RX/DR IN RCRD: CPT | Performed by: INTERNAL MEDICINE

## 2025-07-31 PROCEDURE — G8420 CALC BMI NORM PARAMETERS: HCPCS | Performed by: INTERNAL MEDICINE

## 2025-07-31 PROCEDURE — 1036F TOBACCO NON-USER: CPT | Performed by: INTERNAL MEDICINE

## 2025-07-31 PROCEDURE — 83540 ASSAY OF IRON: CPT

## 2025-07-31 PROCEDURE — 82746 ASSAY OF FOLIC ACID SERUM: CPT

## 2025-07-31 PROCEDURE — 1123F ACP DISCUSS/DSCN MKR DOCD: CPT | Performed by: INTERNAL MEDICINE

## 2025-07-31 PROCEDURE — 1090F PRES/ABSN URINE INCON ASSESS: CPT | Performed by: INTERNAL MEDICINE

## 2025-07-31 PROCEDURE — 84443 ASSAY THYROID STIM HORMONE: CPT

## 2025-07-31 PROCEDURE — G8427 DOCREV CUR MEDS BY ELIG CLIN: HCPCS | Performed by: INTERNAL MEDICINE

## 2025-07-31 PROCEDURE — 82728 ASSAY OF FERRITIN: CPT

## 2025-07-31 PROCEDURE — 82180 ASSAY OF ASCORBIC ACID: CPT

## 2025-07-31 ASSESSMENT — PATIENT HEALTH QUESTIONNAIRE - PHQ9
1. LITTLE INTEREST OR PLEASURE IN DOING THINGS: NOT AT ALL
2. FEELING DOWN, DEPRESSED OR HOPELESS: NOT AT ALL
SUM OF ALL RESPONSES TO PHQ QUESTIONS 1-9: 0

## 2025-08-01 LAB — PATH REV BLD -IMP: NORMAL

## 2025-08-02 LAB — VIT C SERPL-MCNC: 1.5 MG/DL (ref 0.4–2)

## 2025-08-28 PROCEDURE — 93294 REM INTERROG EVL PM/LDLS PM: CPT | Performed by: INTERNAL MEDICINE

## 2025-08-28 PROCEDURE — 93296 REM INTERROG EVL PM/IDS: CPT | Performed by: INTERNAL MEDICINE

## 2025-09-04 ENCOUNTER — OFFICE VISIT (OUTPATIENT)
Age: 89
End: 2025-09-04
Payer: MEDICARE

## 2025-09-04 VITALS
HEART RATE: 64 BPM | SYSTOLIC BLOOD PRESSURE: 120 MMHG | HEIGHT: 65 IN | WEIGHT: 137 LBS | BODY MASS INDEX: 22.82 KG/M2 | DIASTOLIC BLOOD PRESSURE: 70 MMHG

## 2025-09-04 DIAGNOSIS — I48.11 LONGSTANDING PERSISTENT ATRIAL FIBRILLATION (HCC): ICD-10-CM

## 2025-09-04 DIAGNOSIS — I10 PRIMARY HYPERTENSION: ICD-10-CM

## 2025-09-04 DIAGNOSIS — I50.32 CHRONIC DIASTOLIC CONGESTIVE HEART FAILURE (HCC): Primary | ICD-10-CM

## 2025-09-04 DIAGNOSIS — Z95.0 PRESENCE OF BIVENTRICULAR CARDIAC PACEMAKER: ICD-10-CM

## 2025-09-04 PROCEDURE — 1123F ACP DISCUSS/DSCN MKR DOCD: CPT | Performed by: INTERNAL MEDICINE

## 2025-09-04 PROCEDURE — 1090F PRES/ABSN URINE INCON ASSESS: CPT | Performed by: INTERNAL MEDICINE

## 2025-09-04 PROCEDURE — 99214 OFFICE O/P EST MOD 30 MIN: CPT | Performed by: INTERNAL MEDICINE

## 2025-09-04 PROCEDURE — G8420 CALC BMI NORM PARAMETERS: HCPCS | Performed by: INTERNAL MEDICINE

## 2025-09-04 PROCEDURE — G8428 CUR MEDS NOT DOCUMENT: HCPCS | Performed by: INTERNAL MEDICINE

## 2025-09-04 PROCEDURE — 1036F TOBACCO NON-USER: CPT | Performed by: INTERNAL MEDICINE

## 2025-09-04 PROCEDURE — 1126F AMNT PAIN NOTED NONE PRSNT: CPT | Performed by: INTERNAL MEDICINE

## 2025-09-04 RX ORDER — SPIRONOLACTONE 25 MG/1
25 TABLET ORAL DAILY
COMMUNITY
Start: 2025-08-26 | End: 2025-09-25

## 2025-09-04 ASSESSMENT — ENCOUNTER SYMPTOMS: SHORTNESS OF BREATH: 0

## (undated) DEVICE — INTRODUCER SPLIT SHEATH PRELUDE SNAP 6FR X 13CM

## (undated) DEVICE — Device

## (undated) DEVICE — PLASMABLADE X PS210-030S-LIGHT 3.0SL: Brand: PLASMABLADE™ X

## (undated) DEVICE — STERILE SLEEVE: Brand: CONVERTORS

## (undated) DEVICE — PINNACLE TIF INTRODUCER SHEATH: Brand: PINNACLE

## (undated) DEVICE — SUT ETHLN 5-0 18IN P3 BLK --

## (undated) DEVICE — BLADE OPHTH MINI BEAV SHRP --

## (undated) DEVICE — NEEDLE HYPO 30GA L0.5IN BGE POLYPR HUB S STL REG BVL STR

## (undated) DEVICE — GUIDE WIRE WITH HYDROPHILIC COATING: Brand: ACUITY WHISPER VIEW™

## (undated) DEVICE — TUBING PMP FOR CARTO SYS SMARTABLATE

## (undated) DEVICE — APPLICATOR FBR TIP L6IN COT TIP WOOD SHFT SWAB 2000 PER CA

## (undated) DEVICE — SUTURE V-LOC 90 3-0 L9IN ABSRB VLT L26MM V-20 1/2 CIR TAPR VLOCM0644

## (undated) DEVICE — SKIN MARKER FINE TIP: Brand: DEVON

## (undated) DEVICE — SUTURE ABSORBABLE BRAIDED 2-0 CT-1 27 IN UD VICRYL J259H

## (undated) DEVICE — CROUCH CORNEAL PROTECTOR: Brand: BAUSCH + LOMB

## (undated) DEVICE — NEEDLE HYPO 27GA L0.5IN GRY POLYPR HUB S STL REG BVL STR

## (undated) DEVICE — GUIDE COR SNUS L40CM DIA9FR 0.035IN STD CRV ADV UNIQUE

## (undated) DEVICE — KIT ANGIO CNTRST ADMIN W O BWL WORLEY

## (undated) DEVICE — 18G NG KIT WITH 96IN PROBE COVER (10 PK): Brand: SITE-RITE

## (undated) DEVICE — SUTURE ETHBND EXCEL SZ 0 L30IN NONABSORBABLE GRN L26MM CT-2 X412H

## (undated) DEVICE — SYSTEM CLOSURE 6-12 FR VEN VASC VASCADE MVP

## (undated) DEVICE — INTRODUCER LAT VEIN L62CM OD5.5FR ADV TELSCP SYS RENAL

## (undated) DEVICE — GOWN,REINF,POLY,ECL,PP SLV,XL: Brand: MEDLINE

## (undated) DEVICE — HEAD AND NECK: Brand: MEDLINE INDUSTRIES, INC.

## (undated) DEVICE — APPLICATOR COT TIP 3IN WOOD --

## (undated) DEVICE — RADIFOCUS GLIDEWIRE: Brand: GLIDEWIRE

## (undated) DEVICE — CATHETER ABLAT 8FR L115CM 1-6-2MM SPC TIP 3.5MM FJ CRV

## (undated) DEVICE — SOLUTION IV 1000ML 0.9% SOD CHL

## (undated) DEVICE — X-RAY SPONGES,12 PLY: Brand: DERMACEA

## (undated) DEVICE — UTILITY MARKER,BLACK WITH LABELS: Brand: DEVON